# Patient Record
Sex: MALE | Race: WHITE | URBAN - METROPOLITAN AREA
[De-identification: names, ages, dates, MRNs, and addresses within clinical notes are randomized per-mention and may not be internally consistent; named-entity substitution may affect disease eponyms.]

---

## 2017-03-16 ENCOUNTER — INPATIENT (INPATIENT)
Facility: HOSPITAL | Age: 82
LOS: 0 days | Discharge: HOME | End: 2017-03-17
Attending: HOSPITALIST | Admitting: INTERNAL MEDICINE
Payer: MEDICARE

## 2017-03-16 VITALS
DIASTOLIC BLOOD PRESSURE: 70 MMHG | OXYGEN SATURATION: 97 % | SYSTOLIC BLOOD PRESSURE: 149 MMHG | RESPIRATION RATE: 18 BRPM | WEIGHT: 149.91 LBS | HEART RATE: 79 BPM | HEIGHT: 67 IN | TEMPERATURE: 98 F

## 2017-03-16 DIAGNOSIS — Z98.89 OTHER SPECIFIED POSTPROCEDURAL STATES: Chronic | ICD-10-CM

## 2017-03-16 LAB
ALBUMIN SERPL ELPH-MCNC: 3.4 G/DL — SIGNIFICANT CHANGE UP (ref 3.3–5)
ALP SERPL-CCNC: 111 U/L — SIGNIFICANT CHANGE UP (ref 40–120)
ALT FLD-CCNC: 13 U/L — SIGNIFICANT CHANGE UP (ref 12–78)
ANION GAP SERPL CALC-SCNC: 11 MMOL/L — SIGNIFICANT CHANGE UP (ref 5–17)
AST SERPL-CCNC: 21 U/L — SIGNIFICANT CHANGE UP (ref 15–37)
BASOPHILS # BLD AUTO: 0 K/UL — SIGNIFICANT CHANGE UP (ref 0–0.2)
BASOPHILS NFR BLD AUTO: 0.4 % — SIGNIFICANT CHANGE UP (ref 0–2)
BILIRUB SERPL-MCNC: 0.3 MG/DL — SIGNIFICANT CHANGE UP (ref 0.2–1.2)
BUN SERPL-MCNC: 9 MG/DL — SIGNIFICANT CHANGE UP (ref 7–23)
CALCIUM SERPL-MCNC: 8.6 MG/DL — SIGNIFICANT CHANGE UP (ref 8.5–10.1)
CHLORIDE SERPL-SCNC: 105 MMOL/L — SIGNIFICANT CHANGE UP (ref 96–108)
CK SERPL-CCNC: 240 U/L — SIGNIFICANT CHANGE UP (ref 26–308)
CO2 SERPL-SCNC: 25 MMOL/L — SIGNIFICANT CHANGE UP (ref 22–31)
CREAT SERPL-MCNC: 1 MG/DL — SIGNIFICANT CHANGE UP (ref 0.5–1.3)
EOSINOPHIL # BLD AUTO: 0.1 K/UL — SIGNIFICANT CHANGE UP (ref 0–0.5)
EOSINOPHIL NFR BLD AUTO: 1.1 % — SIGNIFICANT CHANGE UP (ref 0–6)
GLUCOSE SERPL-MCNC: 168 MG/DL — HIGH (ref 70–99)
HCT VFR BLD CALC: 39.4 % — SIGNIFICANT CHANGE UP (ref 39–50)
HGB BLD-MCNC: 13.6 G/DL — SIGNIFICANT CHANGE UP (ref 13–17)
INR BLD: 1.07 RATIO — SIGNIFICANT CHANGE UP (ref 0.88–1.16)
LYMPHOCYTES # BLD AUTO: 2.2 K/UL — SIGNIFICANT CHANGE UP (ref 1–3.3)
LYMPHOCYTES # BLD AUTO: 32 % — SIGNIFICANT CHANGE UP (ref 13–44)
MAGNESIUM SERPL-MCNC: 2.2 MG/DL — SIGNIFICANT CHANGE UP (ref 1.8–2.4)
MCHC RBC-ENTMCNC: 30 PG — SIGNIFICANT CHANGE UP (ref 27–34)
MCHC RBC-ENTMCNC: 34.5 GM/DL — SIGNIFICANT CHANGE UP (ref 32–36)
MCV RBC AUTO: 87 FL — SIGNIFICANT CHANGE UP (ref 80–100)
MONOCYTES # BLD AUTO: 0.8 K/UL — SIGNIFICANT CHANGE UP (ref 0–0.9)
MONOCYTES NFR BLD AUTO: 11.1 % — SIGNIFICANT CHANGE UP (ref 2–14)
NEUTROPHILS # BLD AUTO: 3.8 K/UL — SIGNIFICANT CHANGE UP (ref 1.8–7.4)
NEUTROPHILS NFR BLD AUTO: 55.3 % — SIGNIFICANT CHANGE UP (ref 43–77)
PLATELET # BLD AUTO: 238 K/UL — SIGNIFICANT CHANGE UP (ref 150–400)
POTASSIUM SERPL-MCNC: 3.4 MMOL/L — LOW (ref 3.5–5.3)
POTASSIUM SERPL-SCNC: 3.4 MMOL/L — LOW (ref 3.5–5.3)
PROT SERPL-MCNC: 7.1 GM/DL — SIGNIFICANT CHANGE UP (ref 6–8.3)
PROTHROM AB SERPL-ACNC: 11.8 SEC — SIGNIFICANT CHANGE UP (ref 10–13.1)
RBC # BLD: 4.53 M/UL — SIGNIFICANT CHANGE UP (ref 4.2–5.8)
RBC # FLD: 12.3 % — SIGNIFICANT CHANGE UP (ref 10.3–14.5)
SODIUM SERPL-SCNC: 141 MMOL/L — SIGNIFICANT CHANGE UP (ref 135–145)
TROPONIN I SERPL-MCNC: 0.04 NG/ML — SIGNIFICANT CHANGE UP (ref 0.01–0.04)
TROPONIN I SERPL-MCNC: 0.04 NG/ML — SIGNIFICANT CHANGE UP (ref 0.01–0.04)
TROPONIN I SERPL-MCNC: <0.015 NG/ML — SIGNIFICANT CHANGE UP (ref 0.01–0.04)
WBC # BLD: 6.9 K/UL — SIGNIFICANT CHANGE UP (ref 3.8–10.5)
WBC # FLD AUTO: 6.9 K/UL — SIGNIFICANT CHANGE UP (ref 3.8–10.5)

## 2017-03-16 PROCEDURE — 71020: CPT | Mod: 26

## 2017-03-16 PROCEDURE — 73130 X-RAY EXAM OF HAND: CPT | Mod: 26,LT

## 2017-03-16 PROCEDURE — 99285 EMERGENCY DEPT VISIT HI MDM: CPT

## 2017-03-16 PROCEDURE — 73110 X-RAY EXAM OF WRIST: CPT | Mod: 26,LT

## 2017-03-16 PROCEDURE — 93010 ELECTROCARDIOGRAM REPORT: CPT | Mod: 76

## 2017-03-16 RX ORDER — METOPROLOL TARTRATE 50 MG
25 TABLET ORAL
Qty: 0 | Refills: 0 | Status: DISCONTINUED | OUTPATIENT
Start: 2017-03-16 | End: 2017-03-17

## 2017-03-16 RX ORDER — ACETAMINOPHEN 500 MG
650 TABLET ORAL ONCE
Qty: 0 | Refills: 0 | Status: COMPLETED | OUTPATIENT
Start: 2017-03-16 | End: 2017-03-16

## 2017-03-16 RX ORDER — POTASSIUM CHLORIDE 20 MEQ
10 PACKET (EA) ORAL DAILY
Qty: 0 | Refills: 0 | Status: DISCONTINUED | OUTPATIENT
Start: 2017-03-16 | End: 2017-03-17

## 2017-03-16 RX ORDER — SODIUM CHLORIDE 9 MG/ML
3 INJECTION INTRAMUSCULAR; INTRAVENOUS; SUBCUTANEOUS ONCE
Qty: 0 | Refills: 0 | Status: COMPLETED | OUTPATIENT
Start: 2017-03-16 | End: 2017-03-16

## 2017-03-16 RX ORDER — ASPIRIN/CALCIUM CARB/MAGNESIUM 324 MG
325 TABLET ORAL ONCE
Qty: 0 | Refills: 0 | Status: COMPLETED | OUTPATIENT
Start: 2017-03-16 | End: 2017-03-16

## 2017-03-16 RX ADMIN — Medication 650 MILLIGRAM(S): at 13:43

## 2017-03-16 RX ADMIN — Medication 25 MILLIGRAM(S): at 13:43

## 2017-03-16 RX ADMIN — Medication 650 MILLIGRAM(S): at 19:05

## 2017-03-16 RX ADMIN — Medication 325 MILLIGRAM(S): at 13:43

## 2017-03-16 RX ADMIN — SODIUM CHLORIDE 3 MILLILITER(S): 9 INJECTION INTRAMUSCULAR; INTRAVENOUS; SUBCUTANEOUS at 13:26

## 2017-03-16 RX ADMIN — Medication 10 MILLIEQUIVALENT(S): at 15:40

## 2017-03-16 NOTE — ED STATDOCS - PMH
BPH (benign prostatic hyperplasia)    GERD (gastroesophageal reflux disease)    HLD (hyperlipidemia)    HTN (hypertension)    NSVT (nonsustained ventricular tachycardia)    Palpitations    Prostate cancer  s/p radiation

## 2017-03-16 NOTE — ED STATDOCS - MUSCULOSKELETAL, MLM
range of motion is not limited. TTP to dorsal left hand, mostly 3rd and 4th metacarpals. No deformity.

## 2017-03-16 NOTE — ED PROVIDER NOTE - ATTENDING CONTRIBUTION TO CARE
Dr aguayo I agree with scribe and resident documentation, I have supervised tawanda aspects of this chart

## 2017-03-16 NOTE — ED STATDOCS - NS ED MD SCRIBE ATTENDING SCRIBE SECTIONS
CONSULTATIONS/SHIFT CHANGE/HISTORY OF PRESENT ILLNESS/RESULTS/PHYSICAL EXAM/DISPOSITION/PAST MEDICAL/SURGICAL/SOCIAL HISTORY/REVIEW OF SYSTEMS/PROGRESS NOTE

## 2017-03-16 NOTE — ED PROVIDER NOTE - OBJECTIVE STATEMENT
86 y/o M with a hx of BPH, HTN, cardiac stents on Plavix presents to ED s/p slip and fall on ice yesterday onto left hand. Pt is left handed. Denies other injury. Pt did not take his plavix or metoprolol this morning. 86 y/o M with a hx of BPH, HTN, cardiac stents placed 3 years ago, on Plavix presents to ED s/p slip and fall on ice yesterday onto left hand. Pt is left handed. Denies other injury. Pt did not take his plavix or metoprolol this morning. Pt last saw Dr. Luna (cardio) 1 month ago.

## 2017-03-16 NOTE — ED STATDOCS - PROGRESS NOTE DETAILS
Pt with palpitations. EKG shows anterior lateral depressions, change from 6/2016. Denies CP, SOB. xray with no acute dx, patient began to report palpitations, EKG obtained and shows changed, care to be transferred to main ED for further management -Pat Yanez PA-C

## 2017-03-16 NOTE — ED PROVIDER NOTE - NS ED MD SCRIBE ATTENDING SCRIBE SECTIONS
DISPOSITION/HISTORY OF PRESENT ILLNESS/REVIEW OF SYSTEMS/RESULTS/PHYSICAL EXAM/PAST MEDICAL/SURGICAL/SOCIAL HISTORY/PROGRESS NOTE

## 2017-03-16 NOTE — ED PROVIDER NOTE - PROGRESS NOTE DETAILS
D/w Dr. Luna (cardio). Recommends 2 sets of troponins and d/c pt home if negative and pt asymptomatic. AJM: Pt received at signout. trop increased and ecg changes as well. spoke with dr flores who reccs admit. efrain will see pt in AM

## 2017-03-17 VITALS
DIASTOLIC BLOOD PRESSURE: 55 MMHG | RESPIRATION RATE: 19 BRPM | OXYGEN SATURATION: 97 % | TEMPERATURE: 98 F | HEART RATE: 59 BPM | SYSTOLIC BLOOD PRESSURE: 132 MMHG

## 2017-03-17 DIAGNOSIS — M79.642 PAIN IN LEFT HAND: ICD-10-CM

## 2017-03-17 DIAGNOSIS — I10 ESSENTIAL (PRIMARY) HYPERTENSION: ICD-10-CM

## 2017-03-17 DIAGNOSIS — I25.10 ATHEROSCLEROTIC HEART DISEASE OF NATIVE CORONARY ARTERY WITHOUT ANGINA PECTORIS: ICD-10-CM

## 2017-03-17 DIAGNOSIS — I24.9 ACUTE ISCHEMIC HEART DISEASE, UNSPECIFIED: ICD-10-CM

## 2017-03-17 DIAGNOSIS — N40.0 BENIGN PROSTATIC HYPERPLASIA WITHOUT LOWER URINARY TRACT SYMPTOMS: ICD-10-CM

## 2017-03-17 DIAGNOSIS — E87.6 HYPOKALEMIA: ICD-10-CM

## 2017-03-17 DIAGNOSIS — R94.31 ABNORMAL ELECTROCARDIOGRAM [ECG] [EKG]: ICD-10-CM

## 2017-03-17 LAB
ANION GAP SERPL CALC-SCNC: 8 MMOL/L — SIGNIFICANT CHANGE UP (ref 5–17)
BASOPHILS # BLD AUTO: 0 K/UL — SIGNIFICANT CHANGE UP (ref 0–0.2)
BASOPHILS NFR BLD AUTO: 0.8 % — SIGNIFICANT CHANGE UP (ref 0–2)
BUN SERPL-MCNC: 10 MG/DL — SIGNIFICANT CHANGE UP (ref 7–23)
CALCIUM SERPL-MCNC: 8.5 MG/DL — SIGNIFICANT CHANGE UP (ref 8.5–10.1)
CHLORIDE SERPL-SCNC: 107 MMOL/L — SIGNIFICANT CHANGE UP (ref 96–108)
CHOLEST SERPL-MCNC: 224 MG/DL — HIGH (ref 10–199)
CO2 SERPL-SCNC: 28 MMOL/L — SIGNIFICANT CHANGE UP (ref 22–31)
CREAT SERPL-MCNC: 0.76 MG/DL — SIGNIFICANT CHANGE UP (ref 0.5–1.3)
EOSINOPHIL # BLD AUTO: 0.1 K/UL — SIGNIFICANT CHANGE UP (ref 0–0.5)
EOSINOPHIL NFR BLD AUTO: 1.9 % — SIGNIFICANT CHANGE UP (ref 0–6)
GLUCOSE SERPL-MCNC: 106 MG/DL — HIGH (ref 70–99)
HBA1C BLD-MCNC: 6.8 % — HIGH (ref 4–5.6)
HCT VFR BLD CALC: 37 % — LOW (ref 39–50)
HDLC SERPL-MCNC: 56 MG/DL — SIGNIFICANT CHANGE UP (ref 40–125)
HGB BLD-MCNC: 12.1 G/DL — LOW (ref 13–17)
LIPID PNL WITH DIRECT LDL SERPL: 148 MG/DL — HIGH
LYMPHOCYTES # BLD AUTO: 1.7 K/UL — SIGNIFICANT CHANGE UP (ref 1–3.3)
LYMPHOCYTES # BLD AUTO: 30.2 % — SIGNIFICANT CHANGE UP (ref 13–44)
MCHC RBC-ENTMCNC: 29.1 PG — SIGNIFICANT CHANGE UP (ref 27–34)
MCHC RBC-ENTMCNC: 32.7 GM/DL — SIGNIFICANT CHANGE UP (ref 32–36)
MCV RBC AUTO: 89.1 FL — SIGNIFICANT CHANGE UP (ref 80–100)
MONOCYTES # BLD AUTO: 0.7 K/UL — SIGNIFICANT CHANGE UP (ref 0–0.9)
MONOCYTES NFR BLD AUTO: 13 % — SIGNIFICANT CHANGE UP (ref 2–14)
NEUTROPHILS # BLD AUTO: 3 K/UL — SIGNIFICANT CHANGE UP (ref 1.8–7.4)
NEUTROPHILS NFR BLD AUTO: 54 % — SIGNIFICANT CHANGE UP (ref 43–77)
PLATELET # BLD AUTO: 252 K/UL — SIGNIFICANT CHANGE UP (ref 150–400)
POTASSIUM SERPL-MCNC: 3.8 MMOL/L — SIGNIFICANT CHANGE UP (ref 3.5–5.3)
POTASSIUM SERPL-SCNC: 3.8 MMOL/L — SIGNIFICANT CHANGE UP (ref 3.5–5.3)
RBC # BLD: 4.15 M/UL — LOW (ref 4.2–5.8)
RBC # FLD: 12.7 % — SIGNIFICANT CHANGE UP (ref 10.3–14.5)
SODIUM SERPL-SCNC: 143 MMOL/L — SIGNIFICANT CHANGE UP (ref 135–145)
TOTAL CHOLESTEROL/HDL RATIO MEASUREMENT: 4 RATIO — SIGNIFICANT CHANGE UP (ref 3.4–9.6)
TRIGL SERPL-MCNC: 99 MG/DL — SIGNIFICANT CHANGE UP (ref 10–149)
TROPONIN I SERPL-MCNC: 0.02 NG/ML — SIGNIFICANT CHANGE UP (ref 0.01–0.04)
TROPONIN I SERPL-MCNC: 0.03 NG/ML — SIGNIFICANT CHANGE UP (ref 0.01–0.04)
TSH SERPL-MCNC: 2.79 UU/ML — SIGNIFICANT CHANGE UP (ref 0.36–3.74)
WBC # BLD: 5.6 K/UL — SIGNIFICANT CHANGE UP (ref 3.8–10.5)
WBC # FLD AUTO: 5.6 K/UL — SIGNIFICANT CHANGE UP (ref 3.8–10.5)

## 2017-03-17 PROCEDURE — 93306 TTE W/DOPPLER COMPLETE: CPT | Mod: 26

## 2017-03-17 PROCEDURE — 93010 ELECTROCARDIOGRAM REPORT: CPT

## 2017-03-17 RX ORDER — ASPIRIN/CALCIUM CARB/MAGNESIUM 324 MG
81 TABLET ORAL DAILY
Qty: 0 | Refills: 0 | Status: DISCONTINUED | OUTPATIENT
Start: 2017-03-17 | End: 2017-03-17

## 2017-03-17 RX ORDER — ENOXAPARIN SODIUM 100 MG/ML
40 INJECTION SUBCUTANEOUS EVERY 24 HOURS
Qty: 0 | Refills: 0 | Status: DISCONTINUED | OUTPATIENT
Start: 2017-03-17 | End: 2017-03-17

## 2017-03-17 RX ORDER — ATORVASTATIN CALCIUM 80 MG/1
40 TABLET, FILM COATED ORAL AT BEDTIME
Qty: 0 | Refills: 0 | Status: DISCONTINUED | OUTPATIENT
Start: 2017-03-17 | End: 2017-03-17

## 2017-03-17 RX ORDER — ASPIRIN/CALCIUM CARB/MAGNESIUM 324 MG
1 TABLET ORAL
Qty: 0 | Refills: 0 | DISCHARGE
Start: 2017-03-17

## 2017-03-17 RX ORDER — TAMSULOSIN HYDROCHLORIDE 0.4 MG/1
0.4 CAPSULE ORAL AT BEDTIME
Qty: 0 | Refills: 0 | Status: DISCONTINUED | OUTPATIENT
Start: 2017-03-17 | End: 2017-03-17

## 2017-03-17 RX ORDER — CLOPIDOGREL BISULFATE 75 MG/1
75 TABLET, FILM COATED ORAL DAILY
Qty: 0 | Refills: 0 | Status: DISCONTINUED | OUTPATIENT
Start: 2017-03-17 | End: 2017-03-17

## 2017-03-17 RX ORDER — METOPROLOL TARTRATE 50 MG
25 TABLET ORAL
Qty: 0 | Refills: 0 | Status: DISCONTINUED | OUTPATIENT
Start: 2017-03-17 | End: 2017-03-17

## 2017-03-17 RX ORDER — ACETAMINOPHEN 500 MG
650 TABLET ORAL EVERY 6 HOURS
Qty: 0 | Refills: 0 | Status: DISCONTINUED | OUTPATIENT
Start: 2017-03-17 | End: 2017-03-17

## 2017-03-17 RX ADMIN — Medication 25 MILLIGRAM(S): at 06:15

## 2017-03-17 RX ADMIN — Medication 81 MILLIGRAM(S): at 12:03

## 2017-03-17 RX ADMIN — CLOPIDOGREL BISULFATE 75 MILLIGRAM(S): 75 TABLET, FILM COATED ORAL at 12:03

## 2017-03-17 NOTE — CONSULT NOTE ADULT - ASSESSMENT
85 year old man with known CAD presetns for mechanical fall on ice and was admitted for palpitations and change in EKG- He denies chestp ain     Triponins are   minimally elevated and follow up EKG is normal  He wants to go home and follow up in office     recommend out painted ischemic evaluation next week

## 2017-03-17 NOTE — H&P ADULT - PROBLEM SELECTOR PLAN 5
-Xray hand negative for fractures   -Tylenol for pain PRN -Xray hand negative for fractures   -Tylenol for pain PRN  -if pain not improved will need outpatient MRI

## 2017-03-17 NOTE — DISCHARGE NOTE ADULT - PATIENT PORTAL LINK FT
“You can access the [facility] Patient Portal, offered by [facility], by registering with the following website: [email or web address]”

## 2017-03-17 NOTE — DISCHARGE NOTE ADULT - CARE PROVIDER_API CALL
Jony Luna (MD), Cardiovascular Disease; Internal Medicine; Nuclear Cardiology  [address] [place] [address]  Phone: [phone]  Fax: [phone]

## 2017-03-17 NOTE — CONSULT NOTE ADULT - SUBJECTIVE AND OBJECTIVE BOX
CHIEF COMPLAINT: Patient is a 85y old  Male who presents with a chief complaint of I fslipped in the snow and hurt my hand, while I was here my heart started having palpitations (17 Mar 2017 02:32)      HPI:  84 y/o M with PMH of HTN, CAD s/p PCA on Plavix, BPH presented to the hospital with left wrist pain s/p mechanical fall and was noted to have EKG changes. Pt denies any chest pain, palpitations, abdominal pain, nausea, vomiting, urinary symptoms.       PMHx: PAST MEDICAL & SURGICAL HISTORY:  CAD S/P percutaneous coronary angioplasty  Prostate cancer: s/p radiation  Palpitations  NSVT (nonsustained ventricular tachycardia)  GERD (gastroesophageal reflux disease)  HLD (hyperlipidemia)  HTN (hypertension)  BPH (benign prostatic hyperplasia)  S/P cardiac catheterization: diagnostic 12/15/14        Soc Hx:  former smoker       Allergies: Allergies    No Known Allergies        REVIEW OF SYSTEMS:    CONSTITUTIONAL: No weakness, fevers or chills  EYES/ENT: No visual changes;  No vertigo or throat pain   NECK: No pain or stiffness  RESPIRATORY: No cough, wheezing, hemoptysis; No shortness of breath  CARDIOVASCULAR: No chest pain or palpitations  GASTROINTESTINAL: No abdominal or epigastric pain. No nausea, vomiting, or hematemesis; No diarrhea or constipation. No melena or hematochezia.  GENITOURINARY: No dysuria, frequency or hematuria  NEUROLOGICAL: No numbness or weakness  SKIN: No itching, burning, rashes, or lesions   All other review of systems is negative unless indicated above    Vital Signs Last 24 Hrs  T(C): 36.7, Max: 37.1 (03-16 @ 23:33)  T(F): 98.1, Max: 98.7 (03-16 @ 23:33)  HR: 59 (58 - 79)  BP: 137/43 (137/43 - 172/58)  BP(mean): --  RR: 14 (14 - 18)  SpO2: 99% (97% - 99%)    I&O's Summary          PHYSICAL EXAM:   Constitutional: NAD, awake and alert, well-developed  HEENT: PERR, EOMI, Normal Hearing, MMM  Neck: Soft and supple, No LAD, No JVD  Respiratory: Breath sounds are clear bilaterally, No wheezing, rales or rhonchi  Cardiovascular: S1 and S2, regular rate and rhythm, no Murmurs, gallops or rubs  Gastrointestinal: Bowel Sounds present, soft, nontender, nondistended, no guarding, no rebound  Extremities: No peripheral edema  Vascular: 2+ peripheral pulses  Neurological: A/O x 3, no focal deficits  Musculoskeletal: 5/5 strength b/l upper and lower extremities  Skin: No rashes    MEDICATIONS:  MEDICATIONS  (STANDING):  tamsulosin 0.4milliGRAM(s) Oral at bedtime  clopidogrel Tablet 75milliGRAM(s) Oral daily  atorvastatin 40milliGRAM(s) Oral at bedtime  metoprolol 25milliGRAM(s) Oral two times a day  enoxaparin Injectable 40milliGRAM(s) SubCutaneous every 24 hours  aspirin enteric coated 81milliGRAM(s) Oral daily      LABS: All Labs Reviewed:                        13.6   6.9   )-----------( 238      ( 16 Mar 2017 13:21 )             39.4     16 Mar 2017 13:21    141    |  105    |  9      ----------------------------<  168    3.4     |  25     |  1.00     Ca    8.6        16 Mar 2017 13:21  Mg     2.2       16 Mar 2017 13:21    TPro  7.1    /  Alb  3.4    /  TBili  0.3    /  DBili  x      /  AST  21     /  ALT  13     /  AlkPhos  111    16 Mar 2017 13:21    PT/INR - ( 16 Mar 2017 13:21 )   PT: 11.8 sec;   INR: 1.07 ratio           CARDIAC MARKERS ( 17 Mar 2017 02:22 )  0.026 ng/mL / x     / x     / x     / x      CARDIAC MARKERS ( 16 Mar 2017 20:42 )  0.042 ng/mL / x     / x     / x     / x      CARDIAC MARKERS ( 16 Mar 2017 17:10 )  0.043 ng/mL / x     / x     / x     / x      CARDIAC MARKERS ( 16 Mar 2017 13:21 )  <0.015 ng/mL / x     / 240 U/L / x     / x            EKG: SR with PAC, Anterior T wave changes    Telemetry: SR with SA

## 2017-03-17 NOTE — DISCHARGE NOTE ADULT - NS AS ACTIVITY OBS
Stairs allowed/Showering allowed/Driving allowed/Walking-Indoors allowed/Walking-Outdoors allowed/Return to Work/School allowed/Bathing allowed

## 2017-03-17 NOTE — DISCHARGE NOTE ADULT - PLAN OF CARE
rule out ACS.  EKG returned to normal.  No ACS Follow up with Dr. Luna next week for further cardiac testing.  continue all home meds stable continue home meds continue all homes meds continue all home meds

## 2017-03-17 NOTE — H&P ADULT - FAMILY HISTORY
Father  Still living? No  Family history of MI (myocardial infarction), Age at diagnosis: Age Unknown     Mother  Still living? No  Family history of uterine cancer, Age at diagnosis: Age Unknown

## 2017-03-17 NOTE — H&P ADULT - PMH
BPH (benign prostatic hyperplasia)    CAD S/P percutaneous coronary angioplasty    GERD (gastroesophageal reflux disease)    HLD (hyperlipidemia)    HTN (hypertension)    NSVT (nonsustained ventricular tachycardia)    Palpitations    Prostate cancer  s/p radiation

## 2017-03-17 NOTE — PROGRESS NOTE ADULT - SUBJECTIVE AND OBJECTIVE BOX
Pt has been seen and examined with FP resident, resident supervised agree with a/p       85y old  Male who presents with a chief complaint of mechanical fall on ice and wrist pain, here in er found to have new onset ekg change      HPI:  86 y/o M with PMH of HTN, CAD s/p PCA on Plavix, BPH presented to the hospital with left wrist pain s/p mechanical fall and was noted to have EKG changes.    PHYSICAL EXAM:  Vital Signs Last 24 Hrs  T(C): 36.4, Max: 37.1 (03-16 @ 23:33)  T(F): 97.6, Max: 98.7 (03-16 @ 23:33)  HR: 59 (58 - 62)  BP: 132/55 (132/55 - 172/58)  BP(mean): --  RR: 19 (14 - 19)  SpO2: 97% (97% - 99%)  general- comfortable   -rs-aeeb,cta  -cvs-s1s2 normal   -p/a-soft,bs+  -extremity- no assymetrical swelling noted   -cns- non focal         A/P    #New onset EKG changes- seen and evaluated by Dr. Luna  -pt wants to go home and Dr. Luna thinks pt can be discharged with further management as an outpt     #d/c today     #time spent more than 30 minutes
CHIEF COMPLAINT: s/p mechanical fall, EKG changes    SUBJECTIVE: 86 y/o M with PMH of HTN, CAD s/p PCA on Plavix, BPH presented to the hospital with left wrist pain s/p mechanical fall and was noted to have EKG changes. Pt denies any chest pain, palpitations, abdominal pain, nausea, vomiting, urinary symptoms.   Dr. Luna was notified and confirmed that EKG changes are new.     3/17/17 - Patient seen and examined at bedside. Patient in no acute distress.  no events on the tele monitor.  Patient reports that he feels well and denies any chest pain, palpitations, SOB at this time. Patient was seen by cardiologist and was cleared for d/c home and f/u as outpatient.     REVIEW OF SYSTEMS:    CONSTITUTIONAL: No weakness, fevers or chills  EYES/ENT: No visual changes;  No vertigo or throat pain   NECK: No pain or stiffness  RESPIRATORY: No cough, wheezing, hemoptysis; No shortness of breath  CARDIOVASCULAR: No chest pain or palpitations  GASTROINTESTINAL: No abdominal or epigastric pain. No nausea, vomiting, or hematemesis; No diarrhea or constipation. No melena or hematochezia.  GENITOURINARY: No dysuria, frequency or hematuria  NEUROLOGICAL: No numbness or weakness  SKIN: No itching, burning, rashes, or lesions   All other review of systems is negative unless indicated above    T(C): 36.4, Max: 37.1 (03-16 @ 23:33)  T(F): 97.6, Max: 98.7 (03-16 @ 23:33)  HR: 59 (58 - 62)  BP: 132/55 (132/55 - 172/58)  ABP: --  ABP(mean): --  RR: 19 (14 - 19)  SpO2: 97% (97% - 99%)  Wt(kg): --      CAPILLARY BLOOD GLUCOSE      PHYSICAL EXAM:    Constitutional: NAD, awake and alert, well-developed  HEENT: PERR, EOMI, Normal Hearing, MMM  Neck: Soft and supple, No LAD, No JVD  Respiratory: Breath sounds are clear bilaterally, No wheezing, rales or rhonchi  Cardiovascular: S1 and S2, regular rate and rhythm, no Murmurs, gallops or rubs  Gastrointestinal: Bowel Sounds present, soft, nontender, nondistended, no guarding, no rebound  Extremities: No peripheral edema  Vascular: 2+ peripheral pulses  Neurological: A/O x 3, no focal deficits  Musculoskeletal: 5/5 strength b/l upper and lower extremities  Skin: No rashes    MEDICATIONS:  MEDICATIONS  (STANDING):  tamsulosin 0.4milliGRAM(s) Oral at bedtime  clopidogrel Tablet 75milliGRAM(s) Oral daily  atorvastatin 40milliGRAM(s) Oral at bedtime  metoprolol 25milliGRAM(s) Oral two times a day  enoxaparin Injectable 40milliGRAM(s) SubCutaneous every 24 hours  aspirin enteric coated 81milliGRAM(s) Oral daily      LABS: All Labs Reviewed:                        12.1   5.6   )-----------( 252      ( 17 Mar 2017 06:25 )             37.0     17 Mar 2017 06:25    143    |  107    |  10     ----------------------------<  106    3.8     |  28     |  0.76     Ca    8.5        17 Mar 2017 06:25  Mg     2.2       16 Mar 2017 13:21    TPro  7.1    /  Alb  3.4    /  TBili  0.3    /  DBili  x      /  AST  21     /  ALT  13     /  AlkPhos  111    16 Mar 2017 13:21    PT/INR - ( 16 Mar 2017 13:21 )   PT: 11.8 sec;   INR: 1.07 ratio           CARDIAC MARKERS ( 17 Mar 2017 10:00 )  0.017 ng/mL / x     / x     / x     / x      CARDIAC MARKERS ( 17 Mar 2017 02:22 )  0.026 ng/mL / x     / x     / x     / x      CARDIAC MARKERS ( 16 Mar 2017 20:42 )  0.042 ng/mL / x     / x     / x     / x      CARDIAC MARKERS ( 16 Mar 2017 17:10 )  0.043 ng/mL / x     / x     / x     / x      CARDIAC MARKERS ( 16 Mar 2017 13:21 )  <0.015 ng/mL / x     / 240 U/L / x     / x          Blood Culture:     RADIOLOGY/EKG:    DVT PPX:    ADVANCED DIRECTIVE:    DISPOSITION:

## 2017-03-17 NOTE — H&P ADULT - HISTORY OF PRESENT ILLNESS
86 y/o M with PMH of HTN, CAD s/p PCA on Plavix, BPH presented to the hospital with left wrist pain s/p mechanical fall and was noted to have EKG changes. Pt denies any chest pain, palpitations, abdominal pain, nausea, vomiting, urinary symptoms.   Dr. Luna was notified and confirmed that EKG changes are new.

## 2017-03-17 NOTE — PROGRESS NOTE ADULT - PROBLEM SELECTOR PLAN 1
-EKG: sinus rhythm @83bpm, ST depressions V2-V6 on admission with repeat EKG this morning is normal.    -minimally elevated trops, likely due to demand ischemia.  not ACS  -continue ASA, Plavix, Statin, BB  -cardiology consult appreciated.  Patient can be discharged home and f/u with cardio outpatient.

## 2017-03-17 NOTE — PROGRESS NOTE ADULT - PROBLEM SELECTOR PLAN 5
-Xray hand negative for fractures   -Tylenol for pain PRN  -if pain not improved will need outpatient MRI

## 2017-03-17 NOTE — H&P ADULT - PROBLEM SELECTOR PLAN 1
-EKG: sinus rhythm @83bpm, ST depresions V2-V6   -admit to tele  -continues monitorin   -continue Plavix, Statin, BB  -f/u lipid panel, HgA1c   -cardiology consult, possible stress or cath in the am   -YOHAN risk score 4=intermediate risk -EKG: sinus rhythm @83bpm, ST depresions V2-V6   -admit to tele  -continues monitorin   -continue ASA, Plavix, Statin, BB  -f/u lipid panel, HgA1c   -f/u Echo   -cardiology consult, possible stress or cath in the am   -YOHAN risk score 4=intermediate risk

## 2017-03-17 NOTE — DISCHARGE NOTE ADULT - CARE PLAN
Principal Discharge DX:	EKG abnormalities  Goal:	rule out ACS.  EKG returned to normal.  No ACS  Instructions for follow-up, activity and diet:	Follow up with Dr. Luna next week for further cardiac testing.  continue all home meds  Secondary Diagnosis:	BPH (benign prostatic hyperplasia)  Goal:	stable  Instructions for follow-up, activity and diet:	continue home meds  Secondary Diagnosis:	CAD S/P percutaneous coronary angioplasty  Goal:	stable  Instructions for follow-up, activity and diet:	continue all homes meds  Secondary Diagnosis:	HLD (hyperlipidemia)  Goal:	stable  Instructions for follow-up, activity and diet:	continue all home meds

## 2017-03-17 NOTE — H&P ADULT - NSHPLABSRESULTS_GEN_ALL_CORE
CARDIAC MARKERS ( 16 Mar 2017 20:42 )  0.042 ng/mL / x     / x     / x     / x      CARDIAC MARKERS ( 16 Mar 2017 17:10 )  0.043 ng/mL / x     / x     / x     / x      CARDIAC MARKERS ( 16 Mar 2017 13:21 )  <0.015 ng/mL / x     / 240 U/L / x     / x                            13.6   6.9   )-----------( 238      ( 16 Mar 2017 13:21 )             39.4     16 Mar 2017 13:21    141    |  105    |  9      ----------------------------<  168    3.4     |  25     |  1.00     Ca    8.6        16 Mar 2017 13:21  Mg     2.2       16 Mar 2017 13:21    TPro  7.1    /  Alb  3.4    /  TBili  0.3    /  DBili  x      /  AST  21     /  ALT  13     /  AlkPhos  111    16 Mar 2017 13:21    PT/INR - ( 16 Mar 2017 13:21 )   PT: 11.8 sec;   INR: 1.07 ratio         CAPILLARY BLOOD GLUCOSE    LIVER FUNCTIONS - ( 16 Mar 2017 13:21 )  Alb: 3.4 g/dL / Pro: 7.1 gm/dL / ALK PHOS: 111 U/L / ALT: 13 U/L / AST: 21 U/L / GGT: x

## 2017-03-17 NOTE — DISCHARGE NOTE ADULT - MEDICATION SUMMARY - MEDICATIONS TO TAKE
I will START or STAY ON the medications listed below when I get home from the hospital:    aspirin 81 mg oral delayed release tablet  -- 1 tab(s) by mouth once a day  -- Indication: For CAD S/P percutaneous coronary angioplasty    Flomax 0.4 mg oral capsule  -- 1 cap(s) by mouth once a day - home/hospital  -- Indication: For BPH (benign prostatic hyperplasia)    atorvastatin 40 mg oral tablet  -- 1 tab(s) by mouth once a day (at bedtime)  -- Indication: For CAD S/P percutaneous coronary angioplasty    Plavix 75 mg oral tablet  -- 1 tab(s) by mouth once a day - hospital  -- Indication: For CAD S/P percutaneous coronary angioplasty    metoprolol tartrate 25 mg oral tablet  -- 1 tab(s) by mouth 2 times a day  -- Indication: For CAD S/P percutaneous coronary angioplasty

## 2017-03-17 NOTE — H&P ADULT - ATTENDING COMMENTS
Patient seen and examined after initial evaluation by family medicine resident above. Case discussed and reviewed in detail. Please note my plan below    84 yo M with PMH of HTN, dyslipidemia, BPH, osteoporosis, h/o non-sustained V-tach, CAD s/p PCI, p/w mechanical fall. Patient came to ED 2/2 L wrist pain s/p mechanical fall. No fracture reported on X-ray. However, patient had an EKG which demonstrated ST depressions from V2-V6. Patient denies CP, SOB, fever, chills, nausea, vomiting, abdominal pain.    *Abnormal EKG - r/o ACS  -No active CP  -CHRISTINA + Serial EKGs   -H/o CAD - C/w Plavix + ASA  -Statin + Beta blocker  -Cardio consult    *L wrist pain  -CHRISTINA of L wrist is completely intact  -Advised patient that if pain doesn't resolve / improve to get outpatient MRI for further evaluation    *HTN / dyslipidemia / BPH  -C/w home meds    *Hypokalemia  -Replete and recheck potassium  *DVT ppx  -Heparin SubQ

## 2017-03-17 NOTE — DISCHARGE NOTE ADULT - HOSPITAL COURSE
CHIEF COMPLAINT: s/p mechanical fall, EKG changes    SUBJECTIVE: 84 y/o M with PMH of HTN, CAD s/p PCA on Plavix, BPH presented to the hospital with left wrist pain s/p mechanical fall and was noted to have EKG changes. Pt denies any chest pain, palpitations, abdominal pain, nausea, vomiting, urinary symptoms.   Dr. Luna was notified and confirmed that EKG changes are new.     3/17/17 - Patient seen and examined at bedside. Patient in no acute distress.  no events on the tele monitor.  Patient reports that he feels well and denies any chest pain, palpitations, SOB at this time. Patient was seen by cardiologist and was cleared for d/c home and f/u as outpatient.

## 2017-03-21 DIAGNOSIS — Z85.46 PERSONAL HISTORY OF MALIGNANT NEOPLASM OF PROSTATE: ICD-10-CM

## 2017-03-21 DIAGNOSIS — Z92.3 PERSONAL HISTORY OF IRRADIATION: ICD-10-CM

## 2017-03-21 DIAGNOSIS — N40.0 BENIGN PROSTATIC HYPERPLASIA WITHOUT LOWER URINARY TRACT SYMPTOMS: ICD-10-CM

## 2017-03-21 DIAGNOSIS — Z79.02 LONG TERM (CURRENT) USE OF ANTITHROMBOTICS/ANTIPLATELETS: ICD-10-CM

## 2017-03-21 DIAGNOSIS — R94.31 ABNORMAL ELECTROCARDIOGRAM [ECG] [EKG]: ICD-10-CM

## 2017-03-21 DIAGNOSIS — M25.532 PAIN IN LEFT WRIST: ICD-10-CM

## 2017-03-21 DIAGNOSIS — Z95.5 PRESENCE OF CORONARY ANGIOPLASTY IMPLANT AND GRAFT: ICD-10-CM

## 2017-03-21 DIAGNOSIS — E87.6 HYPOKALEMIA: ICD-10-CM

## 2017-03-21 DIAGNOSIS — K21.9 GASTRO-ESOPHAGEAL REFLUX DISEASE WITHOUT ESOPHAGITIS: ICD-10-CM

## 2017-03-21 DIAGNOSIS — M81.0 AGE-RELATED OSTEOPOROSIS WITHOUT CURRENT PATHOLOGICAL FRACTURE: ICD-10-CM

## 2017-03-21 DIAGNOSIS — R00.2 PALPITATIONS: ICD-10-CM

## 2017-03-21 DIAGNOSIS — E78.5 HYPERLIPIDEMIA, UNSPECIFIED: ICD-10-CM

## 2017-03-21 DIAGNOSIS — I25.10 ATHEROSCLEROTIC HEART DISEASE OF NATIVE CORONARY ARTERY WITHOUT ANGINA PECTORIS: ICD-10-CM

## 2017-03-21 DIAGNOSIS — Z87.891 PERSONAL HISTORY OF NICOTINE DEPENDENCE: ICD-10-CM

## 2017-03-21 DIAGNOSIS — I24.8 OTHER FORMS OF ACUTE ISCHEMIC HEART DISEASE: ICD-10-CM

## 2017-03-21 DIAGNOSIS — I10 ESSENTIAL (PRIMARY) HYPERTENSION: ICD-10-CM

## 2018-02-08 ENCOUNTER — INPATIENT (INPATIENT)
Facility: HOSPITAL | Age: 83
LOS: 2 days | Discharge: HOME | End: 2018-02-11
Attending: INTERNAL MEDICINE | Admitting: INTERNAL MEDICINE
Payer: COMMERCIAL

## 2018-02-08 VITALS
WEIGHT: 166.89 LBS | HEART RATE: 76 BPM | DIASTOLIC BLOOD PRESSURE: 70 MMHG | SYSTOLIC BLOOD PRESSURE: 166 MMHG | TEMPERATURE: 102 F | HEIGHT: 66 IN | OXYGEN SATURATION: 94 % | RESPIRATION RATE: 16 BRPM

## 2018-02-08 DIAGNOSIS — Z98.89 OTHER SPECIFIED POSTPROCEDURAL STATES: Chronic | ICD-10-CM

## 2018-02-08 LAB
ALBUMIN SERPL ELPH-MCNC: 3.5 G/DL — SIGNIFICANT CHANGE UP (ref 3.3–5)
ALP SERPL-CCNC: 87 U/L — SIGNIFICANT CHANGE UP (ref 40–120)
ALT FLD-CCNC: 21 U/L — SIGNIFICANT CHANGE UP (ref 12–78)
ANION GAP SERPL CALC-SCNC: 9 MMOL/L — SIGNIFICANT CHANGE UP (ref 5–17)
APPEARANCE UR: CLEAR — SIGNIFICANT CHANGE UP
APTT BLD: 28 SEC — SIGNIFICANT CHANGE UP (ref 27.5–37.4)
AST SERPL-CCNC: 33 U/L — SIGNIFICANT CHANGE UP (ref 15–37)
BACTERIA # UR AUTO: (no result)
BASOPHILS # BLD AUTO: 0 K/UL — SIGNIFICANT CHANGE UP (ref 0–0.2)
BASOPHILS NFR BLD AUTO: 0.2 % — SIGNIFICANT CHANGE UP (ref 0–2)
BILIRUB SERPL-MCNC: 0.5 MG/DL — SIGNIFICANT CHANGE UP (ref 0.2–1.2)
BILIRUB UR-MCNC: NEGATIVE — SIGNIFICANT CHANGE UP
BUN SERPL-MCNC: 16 MG/DL — SIGNIFICANT CHANGE UP (ref 7–23)
CALCIUM SERPL-MCNC: 8.5 MG/DL — SIGNIFICANT CHANGE UP (ref 8.5–10.1)
CHLORIDE SERPL-SCNC: 101 MMOL/L — SIGNIFICANT CHANGE UP (ref 96–108)
CO2 SERPL-SCNC: 24 MMOL/L — SIGNIFICANT CHANGE UP (ref 22–31)
COLOR SPEC: YELLOW — SIGNIFICANT CHANGE UP
CREAT SERPL-MCNC: 1.25 MG/DL — SIGNIFICANT CHANGE UP (ref 0.5–1.3)
DIFF PNL FLD: (no result)
EOSINOPHIL # BLD AUTO: 0 K/UL — SIGNIFICANT CHANGE UP (ref 0–0.5)
EOSINOPHIL NFR BLD AUTO: 0 % — SIGNIFICANT CHANGE UP (ref 0–6)
EPI CELLS # UR: SIGNIFICANT CHANGE UP
GLUCOSE SERPL-MCNC: 143 MG/DL — HIGH (ref 70–99)
GLUCOSE UR QL: NEGATIVE MG/DL — SIGNIFICANT CHANGE UP
HCT VFR BLD CALC: 42 % — SIGNIFICANT CHANGE UP (ref 39–50)
HGB BLD-MCNC: 13.4 G/DL — SIGNIFICANT CHANGE UP (ref 13–17)
KETONES UR-MCNC: (no result)
LACTATE SERPL-SCNC: 1 MMOL/L — SIGNIFICANT CHANGE UP (ref 0.7–2)
LACTATE SERPL-SCNC: 2.3 MMOL/L — HIGH (ref 0.7–2)
LEUKOCYTE ESTERASE UR-ACNC: NEGATIVE — SIGNIFICANT CHANGE UP
LIDOCAIN IGE QN: 58 U/L — LOW (ref 73–393)
LYMPHOCYTES # BLD AUTO: 0.7 K/UL — LOW (ref 1–3.3)
LYMPHOCYTES # BLD AUTO: 5.6 % — LOW (ref 13–44)
MCHC RBC-ENTMCNC: 28.7 PG — SIGNIFICANT CHANGE UP (ref 27–34)
MCHC RBC-ENTMCNC: 31.8 GM/DL — LOW (ref 32–36)
MCV RBC AUTO: 90.1 FL — SIGNIFICANT CHANGE UP (ref 80–100)
MONOCYTES # BLD AUTO: 0.8 K/UL — SIGNIFICANT CHANGE UP (ref 0–0.9)
MONOCYTES NFR BLD AUTO: 6.2 % — SIGNIFICANT CHANGE UP (ref 2–14)
NEUTROPHILS # BLD AUTO: 11.8 K/UL — HIGH (ref 1.8–7.4)
NEUTROPHILS NFR BLD AUTO: 88 % — HIGH (ref 43–77)
NITRITE UR-MCNC: NEGATIVE — SIGNIFICANT CHANGE UP
PH UR: 5 — SIGNIFICANT CHANGE UP (ref 5–8)
PLATELET # BLD AUTO: 201 K/UL — SIGNIFICANT CHANGE UP (ref 150–400)
POTASSIUM SERPL-MCNC: 3.6 MMOL/L — SIGNIFICANT CHANGE UP (ref 3.5–5.3)
POTASSIUM SERPL-SCNC: 3.6 MMOL/L — SIGNIFICANT CHANGE UP (ref 3.5–5.3)
PROT SERPL-MCNC: 7.8 GM/DL — SIGNIFICANT CHANGE UP (ref 6–8.3)
PROT UR-MCNC: 100 MG/DL
RBC # BLD: 4.66 M/UL — SIGNIFICANT CHANGE UP (ref 4.2–5.8)
RBC # FLD: 12.7 % — SIGNIFICANT CHANGE UP (ref 10.3–14.5)
RBC CASTS # UR COMP ASSIST: SIGNIFICANT CHANGE UP /HPF (ref 0–4)
SODIUM SERPL-SCNC: 134 MMOL/L — LOW (ref 135–145)
SP GR SPEC: 1.02 — SIGNIFICANT CHANGE UP (ref 1.01–1.02)
TROPONIN I SERPL-MCNC: 0.03 NG/ML — SIGNIFICANT CHANGE UP (ref 0.01–0.04)
TROPONIN I SERPL-MCNC: 0.04 NG/ML — SIGNIFICANT CHANGE UP (ref 0.01–0.04)
UROBILINOGEN FLD QL: NEGATIVE MG/DL — SIGNIFICANT CHANGE UP
WBC # BLD: 13.4 K/UL — HIGH (ref 3.8–10.5)
WBC # FLD AUTO: 13.4 K/UL — HIGH (ref 3.8–10.5)
WBC UR QL: SIGNIFICANT CHANGE UP

## 2018-02-08 PROCEDURE — 93010 ELECTROCARDIOGRAM REPORT: CPT

## 2018-02-08 PROCEDURE — 70490 CT SOFT TISSUE NECK W/O DYE: CPT | Mod: 26

## 2018-02-08 PROCEDURE — 99285 EMERGENCY DEPT VISIT HI MDM: CPT

## 2018-02-08 PROCEDURE — 71045 X-RAY EXAM CHEST 1 VIEW: CPT | Mod: 26

## 2018-02-08 PROCEDURE — 71250 CT THORAX DX C-: CPT | Mod: 26

## 2018-02-08 PROCEDURE — 70450 CT HEAD/BRAIN W/O DYE: CPT | Mod: 26

## 2018-02-08 RX ORDER — AZITHROMYCIN 500 MG/1
TABLET, FILM COATED ORAL
Qty: 0 | Refills: 0 | Status: DISCONTINUED | OUTPATIENT
Start: 2018-02-08 | End: 2018-02-11

## 2018-02-08 RX ORDER — ACETAMINOPHEN 500 MG
650 TABLET ORAL ONCE
Qty: 0 | Refills: 0 | Status: COMPLETED | OUTPATIENT
Start: 2018-02-08 | End: 2018-02-08

## 2018-02-08 RX ORDER — PIPERACILLIN AND TAZOBACTAM 4; .5 G/20ML; G/20ML
3.38 INJECTION, POWDER, LYOPHILIZED, FOR SOLUTION INTRAVENOUS ONCE
Qty: 0 | Refills: 0 | Status: COMPLETED | OUTPATIENT
Start: 2018-02-08 | End: 2018-02-08

## 2018-02-08 RX ORDER — METOPROLOL TARTRATE 50 MG
25 TABLET ORAL
Qty: 0 | Refills: 0 | Status: DISCONTINUED | OUTPATIENT
Start: 2018-02-08 | End: 2018-02-08

## 2018-02-08 RX ORDER — CEFTRIAXONE 500 MG/1
1000 INJECTION, POWDER, FOR SOLUTION INTRAMUSCULAR; INTRAVENOUS EVERY 24 HOURS
Qty: 0 | Refills: 0 | Status: DISCONTINUED | OUTPATIENT
Start: 2018-02-09 | End: 2018-02-11

## 2018-02-08 RX ORDER — SODIUM CHLORIDE 9 MG/ML
1000 INJECTION INTRAMUSCULAR; INTRAVENOUS; SUBCUTANEOUS
Qty: 0 | Refills: 0 | Status: DISCONTINUED | OUTPATIENT
Start: 2018-02-08 | End: 2018-02-11

## 2018-02-08 RX ORDER — CLOPIDOGREL BISULFATE 75 MG/1
75 TABLET, FILM COATED ORAL DAILY
Qty: 0 | Refills: 0 | Status: DISCONTINUED | OUTPATIENT
Start: 2018-02-08 | End: 2018-02-11

## 2018-02-08 RX ORDER — ASPIRIN/CALCIUM CARB/MAGNESIUM 324 MG
81 TABLET ORAL DAILY
Qty: 0 | Refills: 0 | Status: DISCONTINUED | OUTPATIENT
Start: 2018-02-08 | End: 2018-02-08

## 2018-02-08 RX ORDER — CEFTRIAXONE 500 MG/1
INJECTION, POWDER, FOR SOLUTION INTRAMUSCULAR; INTRAVENOUS
Qty: 0 | Refills: 0 | Status: DISCONTINUED | OUTPATIENT
Start: 2018-02-08 | End: 2018-02-11

## 2018-02-08 RX ORDER — ENOXAPARIN SODIUM 100 MG/ML
40 INJECTION SUBCUTANEOUS EVERY 24 HOURS
Qty: 0 | Refills: 0 | Status: DISCONTINUED | OUTPATIENT
Start: 2018-02-08 | End: 2018-02-11

## 2018-02-08 RX ORDER — VANCOMYCIN HCL 1 G
1000 VIAL (EA) INTRAVENOUS ONCE
Qty: 0 | Refills: 0 | Status: COMPLETED | OUTPATIENT
Start: 2018-02-08 | End: 2018-02-08

## 2018-02-08 RX ORDER — PANTOPRAZOLE SODIUM 20 MG/1
40 TABLET, DELAYED RELEASE ORAL DAILY
Qty: 0 | Refills: 0 | Status: DISCONTINUED | OUTPATIENT
Start: 2018-02-08 | End: 2018-02-09

## 2018-02-08 RX ORDER — SODIUM CHLORIDE 9 MG/ML
500 INJECTION INTRAMUSCULAR; INTRAVENOUS; SUBCUTANEOUS
Qty: 0 | Refills: 0 | Status: COMPLETED | OUTPATIENT
Start: 2018-02-08 | End: 2018-02-08

## 2018-02-08 RX ORDER — ATORVASTATIN CALCIUM 80 MG/1
40 TABLET, FILM COATED ORAL AT BEDTIME
Qty: 0 | Refills: 0 | Status: DISCONTINUED | OUTPATIENT
Start: 2018-02-08 | End: 2018-02-11

## 2018-02-08 RX ORDER — CEFTRIAXONE 500 MG/1
1000 INJECTION, POWDER, FOR SOLUTION INTRAMUSCULAR; INTRAVENOUS ONCE
Qty: 0 | Refills: 0 | Status: COMPLETED | OUTPATIENT
Start: 2018-02-08 | End: 2018-02-08

## 2018-02-08 RX ORDER — TAMSULOSIN HYDROCHLORIDE 0.4 MG/1
0.4 CAPSULE ORAL AT BEDTIME
Qty: 0 | Refills: 0 | Status: DISCONTINUED | OUTPATIENT
Start: 2018-02-08 | End: 2018-02-11

## 2018-02-08 RX ORDER — AZITHROMYCIN 500 MG/1
500 TABLET, FILM COATED ORAL ONCE
Qty: 0 | Refills: 0 | Status: COMPLETED | OUTPATIENT
Start: 2018-02-08 | End: 2018-02-08

## 2018-02-08 RX ORDER — AZITHROMYCIN 500 MG/1
500 TABLET, FILM COATED ORAL EVERY 24 HOURS
Qty: 0 | Refills: 0 | Status: DISCONTINUED | OUTPATIENT
Start: 2018-02-09 | End: 2018-02-11

## 2018-02-08 RX ORDER — CEFTRIAXONE 500 MG/1
INJECTION, POWDER, FOR SOLUTION INTRAMUSCULAR; INTRAVENOUS
Qty: 0 | Refills: 0 | Status: DISCONTINUED | OUTPATIENT
Start: 2018-02-08 | End: 2018-02-08

## 2018-02-08 RX ORDER — SODIUM CHLORIDE 9 MG/ML
3 INJECTION INTRAMUSCULAR; INTRAVENOUS; SUBCUTANEOUS ONCE
Qty: 0 | Refills: 0 | Status: COMPLETED | OUTPATIENT
Start: 2018-02-08 | End: 2018-02-08

## 2018-02-08 RX ADMIN — SODIUM CHLORIDE 2000 MILLILITER(S): 9 INJECTION INTRAMUSCULAR; INTRAVENOUS; SUBCUTANEOUS at 16:12

## 2018-02-08 RX ADMIN — SODIUM CHLORIDE 2000 MILLILITER(S): 9 INJECTION INTRAMUSCULAR; INTRAVENOUS; SUBCUTANEOUS at 16:42

## 2018-02-08 RX ADMIN — SODIUM CHLORIDE 2000 MILLILITER(S): 9 INJECTION INTRAMUSCULAR; INTRAVENOUS; SUBCUTANEOUS at 15:45

## 2018-02-08 RX ADMIN — Medication 75 MILLIGRAM(S): at 16:48

## 2018-02-08 RX ADMIN — PIPERACILLIN AND TAZOBACTAM 200 GRAM(S): 4; .5 INJECTION, POWDER, LYOPHILIZED, FOR SOLUTION INTRAVENOUS at 16:49

## 2018-02-08 RX ADMIN — PANTOPRAZOLE SODIUM 40 MILLIGRAM(S): 20 TABLET, DELAYED RELEASE ORAL at 22:24

## 2018-02-08 RX ADMIN — SODIUM CHLORIDE 3 MILLILITER(S): 9 INJECTION INTRAMUSCULAR; INTRAVENOUS; SUBCUTANEOUS at 16:11

## 2018-02-08 RX ADMIN — CEFTRIAXONE 1000 MILLIGRAM(S): 500 INJECTION, POWDER, FOR SOLUTION INTRAMUSCULAR; INTRAVENOUS at 21:13

## 2018-02-08 RX ADMIN — Medication 250 MILLIGRAM(S): at 16:00

## 2018-02-08 RX ADMIN — ENOXAPARIN SODIUM 40 MILLIGRAM(S): 100 INJECTION SUBCUTANEOUS at 19:46

## 2018-02-08 RX ADMIN — AZITHROMYCIN 255 MILLIGRAM(S): 500 TABLET, FILM COATED ORAL at 19:46

## 2018-02-08 RX ADMIN — SODIUM CHLORIDE 2000 MILLILITER(S): 9 INJECTION INTRAMUSCULAR; INTRAVENOUS; SUBCUTANEOUS at 16:27

## 2018-02-08 RX ADMIN — TAMSULOSIN HYDROCHLORIDE 0.4 MILLIGRAM(S): 0.4 CAPSULE ORAL at 21:28

## 2018-02-08 RX ADMIN — ATORVASTATIN CALCIUM 40 MILLIGRAM(S): 80 TABLET, FILM COATED ORAL at 21:28

## 2018-02-08 RX ADMIN — Medication 650 MILLIGRAM(S): at 16:09

## 2018-02-08 RX ADMIN — SODIUM CHLORIDE 75 MILLILITER(S): 9 INJECTION INTRAMUSCULAR; INTRAVENOUS; SUBCUTANEOUS at 20:07

## 2018-02-08 RX ADMIN — SODIUM CHLORIDE 2000 MILLILITER(S): 9 INJECTION INTRAMUSCULAR; INTRAVENOUS; SUBCUTANEOUS at 15:40

## 2018-02-08 NOTE — H&P ADULT - ASSESSMENT
A/P    #sepsis due to flu along with superimposed bacterial infection- URTI or  possible pneumonia   -admit, iv fluids, blood culture, abx, CT chest and neck, supportive care, food as tolerated   -tamiflu     #elevated glucose- ISS    #hyponatremia- monitor it -most likely due to dehydration and free water intake     #possible syncope- -tele, echo, cardiology evaluation     #low voltage QRS- echo and cardiology evaluation to follow, CT chest to follow     #dvt pr     #Above discussed with pt and pt's family and all questions have been answered A/P    #sepsis due to flu along with superimposed bacterial infection- URTI or  possible pneumonia   -admit, iv fluids, blood culture, abx, CT chest and neck, supportive care, food as tolerated   -tamiflu     #elevated glucose- ISS    #hyponatremia- monitor it -most likely due to dehydration and free water intake     #? syncope- -tele, echo, cardiology evaluation. Most likely pt put his head on dining table due to weakness.     #low voltage QRS- echo and cardiology evaluation to follow, CT chest to follow     #dvt pr     #Above discussed with pt and pt's family and all questions have been answered

## 2018-02-08 NOTE — ED ADULT NURSE NOTE - OBJECTIVE STATEMENT
Pt biba s/p syncopal episode at home. pt was at the doctor today, tested + for the flu  was sent home on tamiflu.

## 2018-02-08 NOTE — CHART NOTE - NSCHARTNOTEFT_GEN_A_CORE
CT scan noted, discussed with pt and family at bedside   -ct same treatment, ent evaluation, gi evaluation

## 2018-02-08 NOTE — ED PROVIDER NOTE - OBJECTIVE STATEMENT
85 y/o male w. PMHx of GERD,HTN,HLD,CAD,NSVT presents to the ED after he started having flu like Sx such as fever, sore throat, non productive cough, diarrhea and decreased appetite onset x 1 day. He visited his PCP, , earlier today. Results showed that he was flu positive and was Rx'd script for Tamiflu. When pt reached home he started drinking broth and started shaking and wife caught him before he hit the floor. There was brief LOC, but no post ictal periods and no head trauma, as per wife. In ED pt denies CP,SOB, dizziness, lightheadedness, abdominal pain, urinary complaints,  weakness in extremities or any other acute medical complaints at this time. 85 y/o male w. PMHx of GERD,HTN,HLD,CAD,NSVT presents to the ED after he started having flu like Sx such as fever, sore throat, non productive cough, diarrhea and decreased appetite onset x 1 day. He visited his PCP, , earlier today. Results showed that he was flu positive and was Rx'd script for Tamiflu. When pt reached home he started drinking broth and started shaking and wife caught him before he hit the floor. There was brief LOC, but no post ictal periods and no head trauma, as per wife. Not incontinent of bowel or bladder function; In ED pt denies CP,SOB, dizziness, lightheadedness, abdominal pain, urinary complaints,  weakness in extremities or any other acute complaints at this time.

## 2018-02-08 NOTE — H&P ADULT - HISTORY OF PRESENT ILLNESS
87 y/o male has productive cough, started few days ago, gradually getting worse, productive of sputum which has just turned to yellow, associated with painful swallowing as his throats hurts during swallowing, not eating properly since last couple of days, getting weaker. on 2/8 went to see his pmd and was prescribed tamiflu after diagnosing him with it, went home and was feeling lethargic. While he was eating he put his head on dinner table and was not able to respond to his wife and brought here.   -no aggravating factor, no relieving factor for above, here in ER fever noted

## 2018-02-08 NOTE — H&P ADULT - NSHPPHYSICALEXAM_GEN_ALL_CORE
Vital Signs Last 24 Hrs  T(C): 39 (08 Feb 2018 15:20), Max: 39 (08 Feb 2018 15:20)  T(F): 102.2 (08 Feb 2018 15:20), Max: 102.2 (08 Feb 2018 15:20)  HR: 76 (08 Feb 2018 15:20) (76 - 76)  BP: 166/70 (08 Feb 2018 15:20) (166/70 - 166/70)  BP(mean): --  RR: 16 (08 Feb 2018 15:20) (16 - 16)  SpO2: 94% (08 Feb 2018 15:20) (94% - 94%)    General: toxic appearance, comfortable   Head- Atraumatic, normocephalic   Neurology: A&Ox3, nonfocal, CN II to XII intact, power intact 5/5 in all muscle group  HEENT- PERRLA, dry muscous membrane, / ? enlarged tonsil noted on left side, congested pharyngeal wall  Neck-supple, no JVD  Respiratory: Air entry equal b/l, CTA   CVS:  S1S2, no murmurs, rubs or gallops  Abdominal: Soft, NT, ND +BS,   Genitourinary- voiding, non palpable bladder  Extremities: No edema, + peripheral pulses  Skin- no rash, no ulcer  Psychiatric- mood stable   LN- no lymphadenopathy

## 2018-02-08 NOTE — H&P ADULT - NSHPLABSRESULTS_GEN_ALL_CORE
13.4   13.4  )-----------( 201      ( 08 Feb 2018 15:38 )             42.0     02-08    134<L>  |  101  |  16  ----------------------------<  143<H>  3.6   |  24  |  1.25    Ca    8.5      08 Feb 2018 15:38    TPro  7.8  /  Alb  3.5  /  TBili  0.5  /  DBili  x   /  AST  33  /  ALT  21  /  AlkPhos  87  02-08    LIVER FUNCTIONS - ( 08 Feb 2018 15:38 )  Alb: 3.5 g/dL / Pro: 7.8 gm/dL / ALK PHOS: 87 U/L / ALT: 21 U/L / AST: 33 U/L / GGT: x             PTT - ( 08 Feb 2018 15:38 )  PTT:28.0 sec      PTT - ( 08 Feb 2018 15:38 )  PTT:28.0 sec  CAPILLARY BLOOD GLUCOSE    #chest xray-reviewed myself- no acute infiltrate noted     #EKg- sinus , low voltage QRS, no acute st and t wave changes noted

## 2018-02-08 NOTE — ED PROVIDER NOTE - MEDICAL DECISION MAKING DETAILS
86 y.o male w. flu like sx's and positive flu swab at PMD office today with Syncope vs. seizure. Sepsis protocol initiated. Will order EKG, CXR LABS, UA, ADMIT

## 2018-02-08 NOTE — PATIENT PROFILE ADULT. - VISION (WITH CORRECTIVE LENSES IF THE PATIENT USUALLY WEARS THEM):
Partially impaired: cannot see medication labels or newsprint, but can see obstacles in path, and the surrounding layout; can count fingers at arm's length/glasses are at home

## 2018-02-08 NOTE — ED ADULT TRIAGE NOTE - CHIEF COMPLAINT QUOTE
Pt. to the ED S/P Syncope episode while sitting at table- Pt. wife states pt. was diagnosed with Flu today - Code Sepsis to the Ed Called by EMS RN

## 2018-02-08 NOTE — PROVIDER CONTACT NOTE (OTHER) - SITUATION
OFFICE AWARE OF CONSULT
spoke with Dmai from answering service will make md aware
spoke with Justyn from answering service will make MD aware

## 2018-02-09 RX ORDER — POTASSIUM CHLORIDE 20 MEQ
40 PACKET (EA) ORAL ONCE
Qty: 0 | Refills: 0 | Status: COMPLETED | OUTPATIENT
Start: 2018-02-09 | End: 2018-02-09

## 2018-02-09 RX ORDER — NYSTATIN 500MM UNIT
500000 POWDER (EA) MISCELLANEOUS THREE TIMES A DAY
Qty: 0 | Refills: 0 | Status: DISCONTINUED | OUTPATIENT
Start: 2018-02-09 | End: 2018-02-11

## 2018-02-09 RX ADMIN — Medication 30 MILLIGRAM(S): at 05:02

## 2018-02-09 RX ADMIN — TAMSULOSIN HYDROCHLORIDE 0.4 MILLIGRAM(S): 0.4 CAPSULE ORAL at 21:49

## 2018-02-09 RX ADMIN — Medication 30 MILLIGRAM(S): at 17:38

## 2018-02-09 RX ADMIN — ENOXAPARIN SODIUM 40 MILLIGRAM(S): 100 INJECTION SUBCUTANEOUS at 19:44

## 2018-02-09 RX ADMIN — ATORVASTATIN CALCIUM 40 MILLIGRAM(S): 80 TABLET, FILM COATED ORAL at 21:49

## 2018-02-09 RX ADMIN — Medication 40 MILLIEQUIVALENT(S): at 11:52

## 2018-02-09 RX ADMIN — AZITHROMYCIN 255 MILLIGRAM(S): 500 TABLET, FILM COATED ORAL at 19:42

## 2018-02-09 RX ADMIN — CLOPIDOGREL BISULFATE 75 MILLIGRAM(S): 75 TABLET, FILM COATED ORAL at 11:52

## 2018-02-09 RX ADMIN — Medication 500000 UNIT(S): at 21:49

## 2018-02-09 RX ADMIN — CEFTRIAXONE 1000 MILLIGRAM(S): 500 INJECTION, POWDER, FOR SOLUTION INTRAMUSCULAR; INTRAVENOUS at 21:49

## 2018-02-09 RX ADMIN — SODIUM CHLORIDE 75 MILLILITER(S): 9 INJECTION INTRAMUSCULAR; INTRAVENOUS; SUBCUTANEOUS at 11:53

## 2018-02-09 NOTE — CONSULT NOTE ADULT - SUBJECTIVE AND OBJECTIVE BOX
CHIEF COMPLAINT: Patient is a 86y old  Male who presents with a chief complaint of cough, lethargy, passing out (08 Feb 2018 20:02)      HPI:  87 y/o male has productive cough, started few days ago, gradually getting worse, productive of sputum which has just turned to yellow, associated with painful swallowing as his throats hurts during swallowing, not eating properly since last couple of days, getting weaker. on 2/8 went to see his pmd and was prescribed tamiflu after diagnosing him with it, went home and was feeling lethargic. While he was eating he put his head on dinner table and was not able to respond to his wife and brought here.   -no aggravating factor, no relieving factor for above, here in ER fever noted (08 Feb 2018 20:02)     feeling better this AM after hydration      PMHx: PAST MEDICAL & SURGICAL HISTORY:  CAD S/P percutaneous coronary angioplasty  Prostate cancer: s/p radiation  Palpitations  NSVT (nonsustained ventricular tachycardia)  GERD (gastroesophageal reflux disease)  HLD (hyperlipidemia)  HTN (hypertension)  BPH (benign prostatic hyperplasia)  S/P cardiac catheterization: diagnostic 12/15/14        Soc Hx:  no toxic habits      Allergies: Allergies    No Known Allergies    Intolerances          REVIEW OF SYSTEMS:    CONSTITUTIONAL: No weakness, fevers or chills  EYES/ENT: No visual changes;  No vertigo or throat pain   NECK: No pain or stiffness  RESPIRATORY: No cough, wheezing, hemoptysis; No shortness of breath  CARDIOVASCULAR: No chest pain or palpitations  GASTROINTESTINAL: No abdominal or epigastric pain. No nausea, vomiting, or hematemesis; No diarrhea or constipation. No melena or hematochezia.  GENITOURINARY: No dysuria, frequency or hematuria  NEUROLOGICAL: No numbness or weakness  SKIN: No itching, burning, rashes, or lesions   All other review of systems is negative unless indicated above    Vital Signs Last 24 Hrs  T(C): 38.7 (09 Feb 2018 05:10), Max: 39 (08 Feb 2018 15:20)  T(F): 101.7 (09 Feb 2018 05:10), Max: 102.2 (08 Feb 2018 15:20)  HR: 89 (09 Feb 2018 05:10) (64 - 89)  BP: 130/42 (09 Feb 2018 05:10) (130/42 - 166/70)  BP(mean): 63 (09 Feb 2018 05:10) (63 - 63)  RR: 18 (08 Feb 2018 22:07) (16 - 22)  SpO2: 91% (09 Feb 2018 05:10) (91% - 97%)    I&O's Summary    08 Feb 2018 07:01  -  09 Feb 2018 07:00  --------------------------------------------------------  IN: 240 mL / OUT: 0 mL / NET: 240 mL            PHYSICAL EXAM:   Constitutional: NAD, awake and alert, well-developed  HEENT: PERR, EOMI, Normal Hearing, MMM  Neck: Soft and supple, No LAD, No JVD  Respiratory: Breath sounds are clear bilaterally, No wheezing, rales or rhonchi  Cardiovascular: S1 and S2, regular rate and rhythm, no Murmurs, gallops or rubs  Gastrointestinal: Bowel Sounds present, soft, nontender, nondistended, no guarding, no rebound  Extremities: No peripheral edema  Vascular: 2+ peripheral pulses  Neurological: A/O x 3, no focal deficits  Musculoskeletal: 5/5 strength b/l upper and lower extremities  Skin: No rashes    MEDICATIONS:  MEDICATIONS  (STANDING):  atorvastatin 40 milliGRAM(s) Oral at bedtime  azithromycin  IVPB 500 milliGRAM(s) IV Intermittent every 24 hours  azithromycin  IVPB      cefTRIAXone Injectable.      cefTRIAXone Injectable. 1000 milliGRAM(s) IV Push every 24 hours  clopidogrel Tablet 75 milliGRAM(s) Oral daily  enoxaparin Injectable 40 milliGRAM(s) SubCutaneous every 24 hours  oseltamivir 30 milliGRAM(s) Oral every 12 hours  pantoprazole  Injectable 40 milliGRAM(s) IV Push daily  sodium chloride 0.9%. 1000 milliLiter(s) (75 mL/Hr) IV Continuous <Continuous>  tamsulosin 0.4 milliGRAM(s) Oral at bedtime      LABS: All Labs Reviewed:                        13.4   13.4  )-----------( 201      ( 08 Feb 2018 15:38 )             42.0     02-08    134<L>  |  101  |  16  ----------------------------<  143<H>  3.6   |  24  |  1.25    Ca    8.5      08 Feb 2018 15:38    TPro  7.8  /  Alb  3.5  /  TBili  0.5  /  DBili  x   /  AST  33  /  ALT  21  /  AlkPhos  87  02-08    PTT - ( 08 Feb 2018 15:38 )  PTT:28.0 sec  CARDIAC MARKERS ( 08 Feb 2018 20:19 )  0.042 ng/mL / x     / x     / x     / x      CARDIAC MARKERS ( 08 Feb 2018 16:56 )  0.030 ng/mL / x     / x     / x     / x            EKG: SR, normal axis nad intervals no significant ST changes     Telemetry: SR

## 2018-02-09 NOTE — CONSULT NOTE ADULT - ASSESSMENT
86 year old man with influenza, with syncopal episode/ AMS       recommend hydration and checking of orthostatics- he is currently hemodynamically stable and NSR on tele     recommend Tamiflu and supportive care    can check 2 d echo, will follow   can monitor tele 24 hrs

## 2018-02-09 NOTE — PROGRESS NOTE ADULT - ATTENDING COMMENTS
pt seen and examined. agree with above with appropriate edits as made.   total time 35 minutes, including coordination of care and discussion with care team.

## 2018-02-09 NOTE — PROGRESS NOTE ADULT - ASSESSMENT
Sepsis in the setting of superimposed bacterial infection/ pneumonia / Flu  - he feels improved from on admission  - lactate initially 2.3 but repeat w/in norm at 1.0  - con't azithromycin /ceftriaxaone / tamiflu (chart review notable for +FLU with PMD eval day of adm)  - supportive therapy    Borderline hyponatremia-  suspect d/t volume depeletion  - con't to monitormost likely due to dehydration and free water intake     Weakness / ? Syncope  - mental status at baseline  - Head CT devoid of acute pathology  - ECG/TELE NSR, no acute ischemic / rhythm abnormalities    CAD  - s/p PCI 2014 as in HPI  - he's chest pain free/ TELE /ECG NSR no ischemic changes    Oropharyngeal Candidiasis  - oral nystatin swish and swallow    BPH  - voiding well con't flomax    Dispo  - full code  - plan of care d/w pt and spouse at bedside Sepsis in the setting of superimposed bacterial infection/ pneumonia / influenza A  - he feels improved from on admission  - lactate initially 2.3 but repeat w/in norm at 1.0  - con't azithromycin /ceftriaxaone / tamiflu (chart review notable for +FLU with PMD eval day of adm)  - supportive therapy    Borderline hyponatremia-  suspect d/t volume depeletion  - con't to monitormost likely due to dehydration and free water intake     Weakness / ? Syncope  - mental status at baseline  - Head CT devoid of acute pathology  - ECG/TELE NSR, no acute ischemic / rhythm abnormalities    CAD  - s/p PCI 2014 as in HPI  - he's chest pain free/ TELE /ECG NSR no ischemic changes    Oropharyngeal Candidiasis  - oral nystatin swish and swallow    BPH  - voiding well con't flomax    Dispo  - full code  - plan of care d/w pt and spouse at bedside

## 2018-02-09 NOTE — PROGRESS NOTE ADULT - SUBJECTIVE AND OBJECTIVE BOX
87 yo Man with pmhx HTN, NSVT, formal smoker, BPH, Prostate ca (s/p radiation) NSTEMI s/p successful PCI with  x2 JEVON LAD and 1 JEVON LM in  at [facility] presented to ED on  with c/o worsening productive cough yellow sputum, started few days prior a/w painful swallowing as his throats hurts during swallowing, not eating properly since last couple of days, getting weaker. on  went to see his pmd and was prescribed tamiflu after diagnosing him with FLU went home and was feeling lethargic. While he was eating he put his head on dinner table and was not able to respond to his wife at which time she brought him to the ED. In ED he was noted to be febrile and lactate elevated.    VITALS:  Vital Signs Last 24 Hrs  T(C): 36.8 (2018 11:42), Max: 38.7 (2018 05:10)  T(F): 98.3 (2018 11:42), Max: 101.7 (2018 05:10)  HR: 69 (2018 11:42) (64 - 89)  BP: 131/46 (2018 11:42) (130/42 - 155/62)  BP(mean): 63 (2018 05:10) (63 - 63)  RR: 18 (2018 11:42) (16 - 22)  SpO2: 93% (2018 11:42) (91% - 97%)    2018 07:01  -  2018 07:00  --------------------------------------------------------  IN:    Oral Fluid: 240 mL  Total IN: 240 mL    OUT:  Total OUT: 0 mL    Total NET: 240 mL    PHYSICAL EXAM:  HEENT:  pupils equal and reactive, EOMI, tongue with scattered thick white debris as is back of throat, no redness noted  NECK:   supple, no carotid bruits, no palpable lymph nodes, no thyromegaly  CV:  RRR, S1S2, no murmurs or rubs  RESP:   lungs clear to auscultation bilaterally, no wheezing, rales, rhonchi, good air entry bilaterally  GI:  abdomen soft, non-tender, non-distended, normal BS, no bruits, no abdominal masses, no palpable masses  :  Voiding well spontaneously  MSK:   normal muscle tone, no atrophy, no rigidity, no contractions  EXT:   no clubbing, no cyanosis, no edema, no calf pain, swelling or erythema  VASCULAR:  pulses equal and symmetric in the upper and lower extremities  NEURO:  AAOX3, no focal neurological deficits, follows all commands, able to move extremities spontaneously  SKIN:  no ulcers, lesions or rashes    LABS                      13.4   13.4  )-----------( 201      ( 2018 15:38 )             42.0         134<L>  |  101  |  16  ----------------------------<  143<H>  3.6   |  24  |  1.25    Ca    8.5      2018 15:38    TPro  7.8  /  Alb  3.5  /  TBili  0.5  /  DBili  x   /  AST  33  /  ALT  21  /  AlkPhos  87      CARDIAC MARKERS ( 2018 20:19 )  0.042 ng/mL / x     / x     / x     / x      CARDIAC MARKERS ( 2018 16:56 )  0.030 ng/mL / x     / x     / x     / x          LIVER FUNCTIONS - ( 2018 15:38 )  Alb: 3.5 g/dL / Pro: 7.8 gm/dL / ALK PHOS: 87 U/L / ALT: 21 U/L / AST: 33 U/L / GGT: x           PTT - ( 2018 15:38 )  PTT:28.0 sec  Urinalysis Basic - ( 2018 21:49 )    Color: Yellow / Appearance: Clear / S.020 / pH: x  Gluc: x / Ketone: Small  / Bili: Negative / Urobili: Negative mg/dL   Blood: x / Protein: 100 mg/dL / Nitrite: Negative   Leuk Esterase: Negative / RBC: 0-5 /HPF / WBC 3-5   Sq Epi: x / Non Sq Epi: Few / Bacteria: Few    Lactate, Blood: 1.0 mmol/L ( @ 20:19)  Lactate, Blood: 2.3 mmol/L ( @ 15:38)    Blood, Urine: Moderate ( @ 21:49)    < from: CT Head No Cont (18 @ 16:33) >  IMPRESSION:       No acute intracranial findings. 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      < end of copied text >  < from: CT Chest No Cont (18 @ 20:36) >  IMPRESSION: Patchy left lower lobe pneumonia.  < end of copied text >    < from: CT Neck Soft Tissue No Cont (18 @ 20:33) >  No retropharyngeal collection, tonsillar or peritonsillar abscess.  Thickening of the right aryepiglottic fold, posterior wall of the   hypopharynx and mild dilatation of the right piriform sinuses direct   endoscopic visualization is suggested for further assessment.     < end of copied text >    MEDICATIONS  (STANDING):  atorvastatin 40 milliGRAM(s) Oral at bedtime  azithromycin  IVPB 500 milliGRAM(s) IV Intermittent every 24 hours  azithromycin  IVPB      cefTRIAXone Injectable.      cefTRIAXone Injectable. 1000 milliGRAM(s) IV Push every 24 hours  clopidogrel Tablet 75 milliGRAM(s) Oral daily  enoxaparin Injectable 40 milliGRAM(s) SubCutaneous every 24 hours  oseltamivir 30 milliGRAM(s) Oral every 12 hours  sodium chloride 0.9%. 1000 milliLiter(s) (75 mL/Hr) IV Continuous <Continuous>  tamsulosin 0.4 milliGRAM(s) Oral at bedtime    MEDICATIONS  (PRN):

## 2018-02-10 LAB
ANION GAP SERPL CALC-SCNC: 6 MMOL/L — SIGNIFICANT CHANGE UP (ref 5–17)
BUN SERPL-MCNC: 7 MG/DL — SIGNIFICANT CHANGE UP (ref 7–23)
CALCIUM SERPL-MCNC: 7.5 MG/DL — LOW (ref 8.5–10.1)
CHLORIDE SERPL-SCNC: 109 MMOL/L — HIGH (ref 96–108)
CO2 SERPL-SCNC: 25 MMOL/L — SIGNIFICANT CHANGE UP (ref 22–31)
CREAT SERPL-MCNC: 0.67 MG/DL — SIGNIFICANT CHANGE UP (ref 0.5–1.3)
CULTURE RESULTS: NO GROWTH — SIGNIFICANT CHANGE UP
GLUCOSE BLDC GLUCOMTR-MCNC: 110 MG/DL — HIGH (ref 70–99)
GLUCOSE SERPL-MCNC: 100 MG/DL — HIGH (ref 70–99)
HCT VFR BLD CALC: 32.2 % — LOW (ref 39–50)
HGB BLD-MCNC: 10.6 G/DL — LOW (ref 13–17)
MAGNESIUM SERPL-MCNC: 2.1 MG/DL — SIGNIFICANT CHANGE UP (ref 1.6–2.6)
MCHC RBC-ENTMCNC: 29.1 PG — SIGNIFICANT CHANGE UP (ref 27–34)
MCHC RBC-ENTMCNC: 32.8 GM/DL — SIGNIFICANT CHANGE UP (ref 32–36)
MCV RBC AUTO: 88.8 FL — SIGNIFICANT CHANGE UP (ref 80–100)
PLATELET # BLD AUTO: 166 K/UL — SIGNIFICANT CHANGE UP (ref 150–400)
POTASSIUM SERPL-MCNC: 3.4 MMOL/L — LOW (ref 3.5–5.3)
POTASSIUM SERPL-SCNC: 3.4 MMOL/L — LOW (ref 3.5–5.3)
RBC # BLD: 3.63 M/UL — LOW (ref 4.2–5.8)
RBC # FLD: 12.8 % — SIGNIFICANT CHANGE UP (ref 10.3–14.5)
SODIUM SERPL-SCNC: 140 MMOL/L — SIGNIFICANT CHANGE UP (ref 135–145)
SPECIMEN SOURCE: SIGNIFICANT CHANGE UP
WBC # BLD: 5.6 K/UL — SIGNIFICANT CHANGE UP (ref 3.8–10.5)
WBC # FLD AUTO: 5.6 K/UL — SIGNIFICANT CHANGE UP (ref 3.8–10.5)

## 2018-02-10 RX ORDER — AMIODARONE HYDROCHLORIDE 400 MG/1
200 TABLET ORAL ONCE
Qty: 0 | Refills: 0 | Status: DISCONTINUED | OUTPATIENT
Start: 2018-02-10 | End: 2018-02-10

## 2018-02-10 RX ORDER — POTASSIUM CHLORIDE 20 MEQ
40 PACKET (EA) ORAL ONCE
Qty: 0 | Refills: 0 | Status: COMPLETED | OUTPATIENT
Start: 2018-02-10 | End: 2018-02-10

## 2018-02-10 RX ORDER — METOPROLOL TARTRATE 50 MG
5 TABLET ORAL ONCE
Qty: 0 | Refills: 0 | Status: COMPLETED | OUTPATIENT
Start: 2018-02-10 | End: 2018-02-10

## 2018-02-10 RX ORDER — DIGOXIN 250 MCG
0.25 TABLET ORAL ONCE
Qty: 0 | Refills: 0 | Status: COMPLETED | OUTPATIENT
Start: 2018-02-10 | End: 2018-02-10

## 2018-02-10 RX ADMIN — ENOXAPARIN SODIUM 40 MILLIGRAM(S): 100 INJECTION SUBCUTANEOUS at 20:11

## 2018-02-10 RX ADMIN — TAMSULOSIN HYDROCHLORIDE 0.4 MILLIGRAM(S): 0.4 CAPSULE ORAL at 21:49

## 2018-02-10 RX ADMIN — ATORVASTATIN CALCIUM 40 MILLIGRAM(S): 80 TABLET, FILM COATED ORAL at 21:49

## 2018-02-10 RX ADMIN — CLOPIDOGREL BISULFATE 75 MILLIGRAM(S): 75 TABLET, FILM COATED ORAL at 11:49

## 2018-02-10 RX ADMIN — Medication 500000 UNIT(S): at 16:17

## 2018-02-10 RX ADMIN — Medication 30 MILLIGRAM(S): at 18:39

## 2018-02-10 RX ADMIN — Medication 5 MILLIGRAM(S): at 09:47

## 2018-02-10 RX ADMIN — Medication 30 MILLIGRAM(S): at 05:13

## 2018-02-10 RX ADMIN — CEFTRIAXONE 1000 MILLIGRAM(S): 500 INJECTION, POWDER, FOR SOLUTION INTRAMUSCULAR; INTRAVENOUS at 21:48

## 2018-02-10 RX ADMIN — Medication 500000 UNIT(S): at 05:13

## 2018-02-10 RX ADMIN — Medication 5 MILLIGRAM(S): at 09:14

## 2018-02-10 RX ADMIN — Medication 0.25 MILLIGRAM(S): at 10:02

## 2018-02-10 RX ADMIN — Medication 40 MILLIEQUIVALENT(S): at 16:16

## 2018-02-10 RX ADMIN — Medication 500000 UNIT(S): at 21:49

## 2018-02-10 RX ADMIN — AZITHROMYCIN 255 MILLIGRAM(S): 500 TABLET, FILM COATED ORAL at 18:39

## 2018-02-10 NOTE — CONSULT NOTE ADULT - SUBJECTIVE AND OBJECTIVE BOX
Patient is a 86y old  Male who presents with a chief complaint of cough, lethargy, passing out (08 Feb 2018 20:02)      HPI:Pt of Zuhair Alvarado  85 y/o male has productive cough, started few days ago, gradually getting worse, productive of sputum which has just turned to yellow, associated with painful swallowing as his throats hurts during swallowing, not eating properly since last couple of days, getting weaker. on 2/8 went to see his pmd and was prescribed tamiflu after diagnosing him with it, went home and was feeling lethargic. While he was eating he put his head on dinner table and was not able to respond to his wife and brought here.   -no aggravating factor, no relieving factor for above, here in ER fever noted (08 Feb 2018 20:02)      I was asked to see pt for dysphagia with pills  pt has been coughin and felt that K pill caused irritation in throat  has recurrent coughing and has tolerated other foods  neg reflux        PAST MEDICAL & SURGICAL HISTORY:  CAD S/P percutaneous coronary angioplasty  Prostate cancer: s/p radiation  Palpitations  NSVT (nonsustained ventricular tachycardia)  GERD (gastroesophageal reflux disease)  HLD (hyperlipidemia)  HTN (hypertension)  BPH (benign prostatic hyperplasia)  S/P cardiac catheterization: diagnostic 12/15/14      MEDICATIONS  (STANDING):  atorvastatin 40 milliGRAM(s) Oral at bedtime  azithromycin  IVPB 500 milliGRAM(s) IV Intermittent every 24 hours  azithromycin  IVPB      cefTRIAXone Injectable.      cefTRIAXone Injectable. 1000 milliGRAM(s) IV Push every 24 hours  clopidogrel Tablet 75 milliGRAM(s) Oral daily  enoxaparin Injectable 40 milliGRAM(s) SubCutaneous every 24 hours  nystatin    Suspension [id number] Unit(s) Swish and Swallow three times a day  oseltamivir 30 milliGRAM(s) Oral every 12 hours  potassium chloride    Tablet ER 40 milliEquivalent(s) Oral once  sodium chloride 0.9%. 1000 milliLiter(s) (75 mL/Hr) IV Continuous <Continuous>  tamsulosin 0.4 milliGRAM(s) Oral at bedtime    MEDICATIONS  (PRN):      Allergies    No Known Allergies    Intolerances        SOCIAL HISTORY:neg drugs    FAMILY HISTORY:  Family history of uterine cancer (Mother)  Family history of MI (myocardial infarction) (Father)      REVIEW OF SYSTEMS:    CONSTITUTIONAL: No weakness, fevers or chills  EYES/ENT: No visual changes;  No vertigo or throat pain   NECK: No pain or stiffness  RESPIRATORY: No cough, wheezing, hemoptysis; No shortness of breath  CARDIOVASCULAR: No chest pain or palpitations  GENITOURINARY: No dysuria, frequency or hematuria  NEUROLOGICAL: No numbness or weakness  SKIN: No itching, burning, rashes, or lesions   All other review of systems is negative unless indicated above.    Vital Signs Last 24 Hrs  T(C): 36.6 (10 Feb 2018 11:20), Max: 37.5 (10 Feb 2018 05:06)  T(F): 97.8 (10 Feb 2018 11:20), Max: 99.5 (10 Feb 2018 05:06)  HR: 67 (10 Feb 2018 12:09) (64 - 130)  BP: 117/48 (10 Feb 2018 12:09) (91/50 - 120/45)  BP(mean): --  RR: 17 (10 Feb 2018 11:20) (17 - 18)  SpO2: 97% (10 Feb 2018 11:20) (96% - 97%)    PHYSICAL EXAM:    Constitutional: NAD, well-developed  HEENT: EOMI, throat clear  Neck: No LAD, supple  Respiratory: CTA and P  Cardiovascular: S1 and S2, RRR, no M  Gastrointestinal: BS+, soft, NT/ND, neg HSM,  Extremities: No peripheral edema, neg clubing, cyanosis  Vascular: 2+ peripheral pulses  Neurological: A/O x 3, no focal deficits  Psychiatric: Normal mood, normal affect  Skin: No rashes    LABS:  CBC Full  -  ( 10 Feb 2018 05:26 )  WBC Count : 5.6 K/uL  Hemoglobin : 10.6 g/dL  Hematocrit : 32.2 %  Platelet Count - Automated : 166 K/uL  Mean Cell Volume : 88.8 fl  Mean Cell Hemoglobin : 29.1 pg  Mean Cell Hemoglobin Concentration : 32.8 gm/dL  Auto Neutrophil # : x  Auto Lymphocyte # : x  Auto Monocyte # : x  Auto Eosinophil # : x  Auto Basophil # : x  Auto Neutrophil % : x  Auto Lymphocyte % : x  Auto Monocyte % : x  Auto Eosinophil % : x  Auto Basophil % : x    02-10    140  |  109<H>  |  7   ----------------------------<  100<H>  3.4<L>   |  25  |  0.67    Ca    7.5<L>      10 Feb 2018 05:26  Mg     2.1     02-10    TPro  7.8  /  Alb  3.5  /  TBili  0.5  /  DBili  x   /  AST  33  /  ALT  21  /  AlkPhos  87  02-08    PTT - ( 08 Feb 2018 15:38 )  PTT:28.0 sec        RADIOLOGY & ADDITIONAL STUDIES:  < from: CT Chest No Cont (02.08.18 @ 20:36) >  EXAM:  CT CHEST                            PROCEDURE DATE:  02/08/2018          INTERPRETATION:  CT CHEST    HISTORY:  fever, cough                     TECHNIQUE: Noncontrast CT of the chest was performed. Coronal and   sagittal images were reconstructed. This study was performed using   automatic exposure control (radiation dose reduction software) to obtain   a diagnostic image quality scan with patient dose as low as reasonably   achievable.    COMPARISON: Chest x-ray from the same day    FINDINGS:    LUNGS, AIRWAYS: The central airways are patent. Patchy left lower lobe   airspace disease.    PLEURA: No pleural abnormality.    HEART AND VESSELS: Normal heart size. No pericardial effusion. Coronary   and aortic atherosclerosis without aneurysm.    MEDIASTINUM AND PACO: No adenopathy.    UPPER ABDOMEN: Gallstones. Submucosal fat in the stomach suggestive of   chronic gastritis.    BONES AND SOFT TISSUES: No acute bony abnormality.    IMPRESSION:     Patchy left lower lobe pneumonia.          < end of copied text >

## 2018-02-10 NOTE — PROGRESS NOTE ADULT - SUBJECTIVE AND OBJECTIVE BOX
HOSPITAL COURSE AND ASSESSMENT  85 yo M with PMH HTN, NSVT, formal smoker, BPH, Prostate ca (s/p radiation) NSTEMI s/p successful PCI x2 JEVON LAD and 1 JEVON LM in 2014 at [facility] presented to ED on 2/8 with c/o worsening productive cough yellow sputum, started few days prior a/w painful swallowing as his throats hurts during swallowing, not eating properly since last couple of days, getting weaker. on 2/8 went to see his pmd and was prescribed tamiflu after diagnosing him with FLU went home and was feeling lethargic. While he was eating he put his head on dinner table and was not able to respond to his wife at which time she brought him to the ED.     In ED he was noted to be febrile and lactate elevated. He was admitted to telemetry and treated for influenza. Cardiology was involved for SVT and his medications were adjusted.         Sepsis in the setting of superimposed CAP / influenza (unknown type, outside testing)  - blood culture no growth   - lactate initially 2.3 but repeat w/in norm at 1.0  - azithromycin ceftriaxone / tamiflu (chart review notable for +FLU with PMD eval day of adm) day 3  - wean O2    SVT  -medications adjusted by cardiology    hypovolemic hyponatremia, mild  -resolved    Weakness / ? Syncope  - mental status at baseline  - Head CT devoid of acute pathology  - ECG/TELE NSR, no acute ischemic / rhythm abnormalities    CAD  - s/p PCI 2014 as in HPI  - chest pain free/ TELE /ECG NSR no ischemic changes    Oropharyngeal Candidiasis  - oral nystatin swish and swallow    BPH  - flomax    Dispo  - full code  - discharge in 1-2 days with HR controlled    ----------------------------------------------------------------------------------------------  CHIEF COMPLAINT:  flu    SUBJECTIVE:   tolerating PO. OOB. oxygen weaned this AM then replaced after episode of SVT    REVIEW OF SYSTEMS:  All other review of systems is negative unless indicated above    Vital Signs Last 24 Hrs  T(C): 36.6 (10 Feb 2018 11:20), Max: 37.5 (10 Feb 2018 05:06)  T(F): 97.8 (10 Feb 2018 11:20), Max: 99.5 (10 Feb 2018 05:06)  HR: 67 (10 Feb 2018 12:09) (64 - 130)  BP: 117/48 (10 Feb 2018 12:09) (91/50 - 120/45)  BP(mean): --  RR: 17 (10 Feb 2018 11:20) (17 - 18)  SpO2: 97% (10 Feb 2018 11:20) (96% - 97%)  CAPILLARY BLOOD GLUCOSE      POCT Blood Glucose.: 110 mg/dL (10 Feb 2018 09:12)      PHYSICAL EXAM:  Constitutional: NAD, NCAT  HEENT: EOMI, Normal Hearing, MMM, fair dentition  Neck: trachea midline, No JVD  Respiratory: Breath sounds are clear, unlabored respiration  Cardiovascular: S1 and S2, regular rate   Gastrointestinal: Bowel Sounds present, soft, nontender, nondistended  Extremities: No peripheral edema  Vascular: 2+ radial pulse  Neurological: awake, alert, follows commands, sensation grossly intact  Psych: appropriate affect,  insight  Musculoskeletal: moves all extremities  Skin: No rashes    ALLERGIES  No Known Allergies      MEDICATIONS  (STANDING):  atorvastatin 40 milliGRAM(s) Oral at bedtime  azithromycin  IVPB 500 milliGRAM(s) IV Intermittent every 24 hours  azithromycin  IVPB      cefTRIAXone Injectable.      cefTRIAXone Injectable. 1000 milliGRAM(s) IV Push every 24 hours  clopidogrel Tablet 75 milliGRAM(s) Oral daily  enoxaparin Injectable 40 milliGRAM(s) SubCutaneous every 24 hours  nystatin    Suspension 338260 Unit(s) Swish and Swallow three times a day  oseltamivir 30 milliGRAM(s) Oral every 12 hours  sodium chloride 0.9%. 1000 milliLiter(s) (75 mL/Hr) IV Continuous <Continuous>  tamsulosin 0.4 milliGRAM(s) Oral at bedtime      LABS: All Labs Reviewed:                        10.6   5.6   )-----------( 166      ( 10 Feb 2018 05:26 )             32.2     02-10    140  |  109<H>  |  7   ----------------------------<  100<H>  3.4<L>   |  25  |  0.67    Ca    7.5<L>      10 Feb 2018 05:26  Mg     2.1     02-10    TPro  7.8  /  Alb  3.5  /  TBili  0.5  /  DBili  x   /  AST  33  /  ALT  21  /  AlkPhos  87  02-08    PTT - ( 08 Feb 2018 15:38 )  PTT:28.0 sec  CARDIAC MARKERS ( 08 Feb 2018 20:19 )  0.042 ng/mL / x     / x     / x     / x      CARDIAC MARKERS ( 08 Feb 2018 16:56 )  0.030 ng/mL / x     / x     / x     / x          Blood Culture: 02-08 @ 20:19  Organism --  Gram Stain Blood -- Gram Stain --  Specimen Source .Blood None  Culture-Blood --    02-08 @ 15:38  Organism --  Gram Stain Blood -- Gram Stain --  Specimen Source .Blood Blood-Peripheral  Culture-Blood --

## 2018-02-10 NOTE — CONSULT NOTE ADULT - ASSESSMENT
Sepsis in the setting of superimposed bacterial infection/ pneumonia / influenza A  - he feels improved from on admission      dysphagia due to irritation nad coughing  DW pt, using fluids with pills    I will sign off

## 2018-02-10 NOTE — PROGRESS NOTE ADULT - ASSESSMENT
86 year old man with influenza, with syncopal episode/ AMS       recommend hydration and checking of orthostatics-     recommend Tamiflu and supportive care    can check 2 d echo, will follow     SVT- no improvement in HR with 2 bolus of metoprolol, will give digoxin and monitor

## 2018-02-11 ENCOUNTER — TRANSCRIPTION ENCOUNTER (OUTPATIENT)
Age: 83
End: 2018-02-11

## 2018-02-11 VITALS
RESPIRATION RATE: 18 BRPM | DIASTOLIC BLOOD PRESSURE: 54 MMHG | SYSTOLIC BLOOD PRESSURE: 153 MMHG | OXYGEN SATURATION: 96 % | HEART RATE: 65 BPM | TEMPERATURE: 98 F

## 2018-02-11 LAB
ANION GAP SERPL CALC-SCNC: 4 MMOL/L — LOW (ref 5–17)
BUN SERPL-MCNC: 7 MG/DL — SIGNIFICANT CHANGE UP (ref 7–23)
CALCIUM SERPL-MCNC: 7.3 MG/DL — LOW (ref 8.5–10.1)
CHLORIDE SERPL-SCNC: 111 MMOL/L — HIGH (ref 96–108)
CO2 SERPL-SCNC: 26 MMOL/L — SIGNIFICANT CHANGE UP (ref 22–31)
CREAT SERPL-MCNC: 0.62 MG/DL — SIGNIFICANT CHANGE UP (ref 0.5–1.3)
GLUCOSE SERPL-MCNC: 106 MG/DL — HIGH (ref 70–99)
POTASSIUM SERPL-MCNC: 3.4 MMOL/L — LOW (ref 3.5–5.3)
POTASSIUM SERPL-SCNC: 3.4 MMOL/L — LOW (ref 3.5–5.3)
SODIUM SERPL-SCNC: 141 MMOL/L — SIGNIFICANT CHANGE UP (ref 135–145)

## 2018-02-11 RX ORDER — METOPROLOL TARTRATE 50 MG
12.5 TABLET ORAL ONCE
Qty: 0 | Refills: 0 | Status: COMPLETED | OUTPATIENT
Start: 2018-02-11 | End: 2018-02-11

## 2018-02-11 RX ORDER — METOPROLOL TARTRATE 50 MG
25 TABLET ORAL ONCE
Qty: 0 | Refills: 0 | Status: DISCONTINUED | OUTPATIENT
Start: 2018-02-11 | End: 2018-02-11

## 2018-02-11 RX ORDER — CEFUROXIME AXETIL 250 MG
1 TABLET ORAL
Qty: 10 | Refills: 0
Start: 2018-02-11 | End: 2018-02-15

## 2018-02-11 RX ORDER — METOPROLOL TARTRATE 50 MG
12.5 TABLET ORAL
Qty: 0 | Refills: 0 | Status: DISCONTINUED | OUTPATIENT
Start: 2018-02-11 | End: 2018-02-11

## 2018-02-11 RX ADMIN — Medication 30 MILLIGRAM(S): at 05:42

## 2018-02-11 RX ADMIN — Medication 500000 UNIT(S): at 05:42

## 2018-02-11 RX ADMIN — Medication 12.5 MILLIGRAM(S): at 11:02

## 2018-02-11 RX ADMIN — CLOPIDOGREL BISULFATE 75 MILLIGRAM(S): 75 TABLET, FILM COATED ORAL at 11:02

## 2018-02-11 NOTE — DISCHARGE NOTE ADULT - MEDICATION SUMMARY - MEDICATIONS TO TAKE
I will START or STAY ON the medications listed below when I get home from the hospital:    aspirin 81 mg oral delayed release tablet  -- 1 tab(s) by mouth once a day  -- Indication: For heart health    Flomax 0.4 mg oral capsule  -- 1 cap(s) by mouth once a day - home/hospital  -- Indication: For prostate    atorvastatin 40 mg oral tablet  -- 1 tab(s) by mouth once a day (at bedtime)  -- Indication: For cholesterol    Plavix 75 mg oral tablet  -- 1 tab(s) by mouth once a day - hospital  -- Indication: For hear health    oseltamivir 30 mg oral capsule  -- 1 cap(s) by mouth every 12 hours  -- Indication: For Flu    metoprolol tartrate 25 mg oral tablet  -- 1 tab(s) by mouth 2 times a day  -- Indication: For blood pressure, heart rate    cefuroxime 500 mg oral tablet  -- 1 tab(s) by mouth every 12 hours   -- Finish all this medication unless otherwise directed by prescriber.  Medication should be taken with plenty of water.  Take with food or milk.    -- Indication: For antibiotic for pneumonia

## 2018-02-11 NOTE — PROGRESS NOTE ADULT - ASSESSMENT
86 year old man with influenza, with syncopal episode/ AMS       recommend hydration and checking of orthostatics-     recommend Tamiflu and supportive care    can check 2 d echo( can do as out patient, will follow     SVT- improved after digoxin, will continue with low dose metoprolol      if he remains hemodynamically stable, can discharge from cardiac perspective

## 2018-02-11 NOTE — DISCHARGE NOTE ADULT - HOSPITAL COURSE
87 yo M with PMH HTN, NSVT, formal smoker, BPH, Prostate ca (s/p radiation) NSTEMI s/p successful PCI x2 JEVON LAD and 1 JEVON LM in 2014 at [facility] presented to ED on 2/8 with c/o worsening productive cough yellow sputum, started few days prior a/w painful swallowing as his throats hurts during swallowing, not eating properly since last couple of days, getting weaker. on 2/8 went to see his pmd and was prescribed tamiflu after diagnosing him with FLU went home and was feeling lethargic. While he was eating he put his head on dinner table and was not able to respond to his wife at which time she brought him to the ED.     In ED he was noted to be febrile and lactate elevated. He was admitted to telemetry and treated for influenza. Cardiology was involved for SVT and his medications were adjusted. He was medically stable for discharge and in agreement with this plan.         Sepsis in the setting of superimposed CAP on influenza (unknown type, outside testing)  - blood culture no growth   - lactate initially 2.3 but repeat w/in norm at 1.0  - azithromycin ceftriaxone / tamiflu (chart review notable for +FLU with PMD eval day of adm) day 3  - weaned O2    persistent SVT  -medications adjusted by cardiology    hypovolemic hyponatremia, mild  -resolved    Weakness / ? Syncope  - mental status at baseline  - Head CT devoid of acute pathology  - ECG/TELE NSR, no acute ischemic / rhythm abnormalities    CAD  - s/p PCI 2014 as in HPI  - chest pain free/ TELE /ECG NSR no ischemic changes    Oropharyngeal Candidiasis  - oral nystatin swish and swallow    BPH  - flomax    Dispo  - full code  - discharge in 1-2 days with HR controlled    total time 40 minutes, including coordination of care  GEN: NAD  EYES: eomi  CV: no edema  RESP: unlabored

## 2018-02-11 NOTE — DISCHARGE NOTE ADULT - CARE PLAN
Principal Discharge DX:	Flu  Goal:	to feel better  Assessment and plan of treatment:	Complete tamiflu.

## 2018-02-11 NOTE — DISCHARGE NOTE ADULT - VISION (WITH CORRECTIVE LENSES IF THE PATIENT USUALLY WEARS THEM):
glasses are at home/Partially impaired: cannot see medication labels or newsprint, but can see obstacles in path, and the surrounding layout; can count fingers at arm's length

## 2018-02-13 LAB
CULTURE RESULTS: SIGNIFICANT CHANGE UP
CULTURE RESULTS: SIGNIFICANT CHANGE UP
SPECIMEN SOURCE: SIGNIFICANT CHANGE UP
SPECIMEN SOURCE: SIGNIFICANT CHANGE UP

## 2018-02-14 LAB
CULTURE RESULTS: SIGNIFICANT CHANGE UP
SPECIMEN SOURCE: SIGNIFICANT CHANGE UP

## 2018-02-19 DIAGNOSIS — J18.9 PNEUMONIA, UNSPECIFIED ORGANISM: ICD-10-CM

## 2018-02-19 DIAGNOSIS — Z87.891 PERSONAL HISTORY OF NICOTINE DEPENDENCE: ICD-10-CM

## 2018-02-19 DIAGNOSIS — E87.1 HYPO-OSMOLALITY AND HYPONATREMIA: ICD-10-CM

## 2018-02-19 DIAGNOSIS — N40.0 BENIGN PROSTATIC HYPERPLASIA WITHOUT LOWER URINARY TRACT SYMPTOMS: ICD-10-CM

## 2018-02-19 DIAGNOSIS — Z85.46 PERSONAL HISTORY OF MALIGNANT NEOPLASM OF PROSTATE: ICD-10-CM

## 2018-02-19 DIAGNOSIS — I25.10 ATHEROSCLEROTIC HEART DISEASE OF NATIVE CORONARY ARTERY WITHOUT ANGINA PECTORIS: ICD-10-CM

## 2018-02-19 DIAGNOSIS — E78.5 HYPERLIPIDEMIA, UNSPECIFIED: ICD-10-CM

## 2018-02-19 DIAGNOSIS — R55 SYNCOPE AND COLLAPSE: ICD-10-CM

## 2018-02-19 DIAGNOSIS — Z95.5 PRESENCE OF CORONARY ANGIOPLASTY IMPLANT AND GRAFT: ICD-10-CM

## 2018-02-19 DIAGNOSIS — I10 ESSENTIAL (PRIMARY) HYPERTENSION: ICD-10-CM

## 2018-02-19 DIAGNOSIS — I25.2 OLD MYOCARDIAL INFARCTION: ICD-10-CM

## 2018-02-19 DIAGNOSIS — K21.9 GASTRO-ESOPHAGEAL REFLUX DISEASE WITHOUT ESOPHAGITIS: ICD-10-CM

## 2018-02-19 DIAGNOSIS — A41.9 SEPSIS, UNSPECIFIED ORGANISM: ICD-10-CM

## 2018-02-19 DIAGNOSIS — Z79.01 LONG TERM (CURRENT) USE OF ANTICOAGULANTS: ICD-10-CM

## 2018-02-19 DIAGNOSIS — Z79.82 LONG TERM (CURRENT) USE OF ASPIRIN: ICD-10-CM

## 2018-02-19 DIAGNOSIS — I47.1 SUPRAVENTRICULAR TACHYCARDIA: ICD-10-CM

## 2018-02-19 DIAGNOSIS — E86.0 DEHYDRATION: ICD-10-CM

## 2018-02-19 DIAGNOSIS — J10.1 INFLUENZA DUE TO OTHER IDENTIFIED INFLUENZA VIRUS WITH OTHER RESPIRATORY MANIFESTATIONS: ICD-10-CM

## 2019-01-31 PROBLEM — I25.10 ATHEROSCLEROTIC HEART DISEASE OF NATIVE CORONARY ARTERY WITHOUT ANGINA PECTORIS: Chronic | Status: ACTIVE | Noted: 2017-03-17

## 2019-02-07 ENCOUNTER — APPOINTMENT (OUTPATIENT)
Dept: DERMATOLOGY | Facility: CLINIC | Age: 84
End: 2019-02-07
Payer: MEDICARE

## 2019-02-07 VITALS — HEIGHT: 67 IN | BODY MASS INDEX: 24.48 KG/M2 | WEIGHT: 156 LBS

## 2019-02-07 DIAGNOSIS — Z00.00 ENCOUNTER FOR GENERAL ADULT MEDICAL EXAMINATION W/OUT ABNORMAL FINDINGS: ICD-10-CM

## 2019-02-07 DIAGNOSIS — D18.01 HEMANGIOMA OF SKIN AND SUBCUTANEOUS TISSUE: ICD-10-CM

## 2019-02-07 DIAGNOSIS — D22.9 MELANOCYTIC NEVI, UNSPECIFIED: ICD-10-CM

## 2019-02-07 DIAGNOSIS — L82.1 OTHER SEBORRHEIC KERATOSIS: ICD-10-CM

## 2019-02-07 DIAGNOSIS — Z86.79 PERSONAL HISTORY OF OTHER DISEASES OF THE CIRCULATORY SYSTEM: ICD-10-CM

## 2019-02-07 PROCEDURE — 99203 OFFICE O/P NEW LOW 30 MIN: CPT

## 2019-02-07 RX ORDER — METOPROLOL TARTRATE 25 MG/1
25 TABLET, FILM COATED ORAL
Qty: 30 | Refills: 0 | Status: DISCONTINUED | COMMUNITY
Start: 2018-08-24

## 2019-02-07 NOTE — PHYSICAL EXAM
[Alert] : alert [Oriented x 3] : ~L oriented x 3 [Well Nourished] : well nourished [Declined] : declined [Eyelids] : Eyelids [Ears] : Ears [Lips] : Lips [Neck] : Neck [FreeTextEntry3] : A skin exam was performed from the belt up including the scalp, face, neck, chest, abdomen, back and upper extremities.  The patient declined examination below the belt. \par The exam did not reveal any evidence of skin cancer, showing only the following benign growths:\par Dunn pigmented nevi - including 5 mm dome shaped hyperpigmented papule of the left posterior auricular region, c/w compound nevus.\par Seborrheic keratoses.\par Cherry angioma.\par

## 2019-02-07 NOTE — HISTORY OF PRESENT ILLNESS
[FreeTextEntry1] : Fit in for lesion, posterior to the left ear. [de-identified] : Present for 6 years, without growth, bleeding, tenderness or itching.

## 2019-02-16 NOTE — PATIENT PROFILE ADULT. - SOCIAL CONCERNS
76 yo F pmhx of HTN HLD and edema (on lasix) sent from PMD to r/o LLE DVT. Reports that she has had bilateral calf pain with walking x 1 week. Took aleeve with some improvement. No pain at rest. Denies associated sxs. Denies cp sob weakness HA dizziness orthopnea numbness tingling. None

## 2019-04-30 NOTE — DISCHARGE NOTE ADULT - ADMISSION DATE +STARTOFVISITDATE
DOS: 4/30/19    Pt previously seen by me for L chin sebaceous cyst, has started to become irritated and has started to develop a head. Had incision and drainage done in Urgent Care, but developed some pus and drainage afterwards, persistent. Started on ABx, less discharge. No other aggravating or alleviating factors noted.    On exam, L chin sebaceous cyst, about 1 cm in diameter, not inflamed or tender.     Assessment and Plan: plan for cyst excision under local anesthetic, risks bleeding/infection explained, will schedule for near future.   
Statement Selected

## 2020-10-16 ENCOUNTER — RESULT REVIEW (OUTPATIENT)
Age: 85
End: 2020-10-16

## 2021-08-20 ENCOUNTER — APPOINTMENT (OUTPATIENT)
Dept: ELECTROPHYSIOLOGY | Facility: CLINIC | Age: 86
End: 2021-08-20
Payer: MEDICARE

## 2021-08-20 ENCOUNTER — NON-APPOINTMENT (OUTPATIENT)
Age: 86
End: 2021-08-20

## 2021-08-20 VITALS
RESPIRATION RATE: 14 BRPM | BODY MASS INDEX: 23.86 KG/M2 | HEIGHT: 67 IN | OXYGEN SATURATION: 97 % | HEART RATE: 66 BPM | WEIGHT: 152 LBS | SYSTOLIC BLOOD PRESSURE: 174 MMHG | DIASTOLIC BLOOD PRESSURE: 73 MMHG

## 2021-08-20 DIAGNOSIS — R00.1 BRADYCARDIA, UNSPECIFIED: ICD-10-CM

## 2021-08-20 PROCEDURE — 99204 OFFICE O/P NEW MOD 45 MIN: CPT

## 2021-08-20 PROCEDURE — 93000 ELECTROCARDIOGRAM COMPLETE: CPT | Mod: 59

## 2021-08-20 RX ORDER — ROSUVASTATIN CALCIUM 10 MG/1
10 TABLET, FILM COATED ORAL
Qty: 30 | Refills: 0 | Status: DISCONTINUED | COMMUNITY
Start: 2018-08-24 | End: 2021-08-20

## 2021-09-13 NOTE — DISCUSSION/SUMMARY
[FreeTextEntry1] : 89-year-old male with history of DM2 HTN dyslipidemia BPH who complains of occasional palpitations lasting up to 20 minutes at times patient takes metoprolol XL 25 mg daily with some symptom improvement patient also has sinus bradycardia.\par HTN not well controlled start coreg 3.25mg bid and can increase gradually if needed avoid salt diet\par zio monitor for 2 weeks\par follow with pcp\par Total length of time spent with this patient was 45 minutes and more then half of the time was spent face to face with the patient as well as counseling and coordination of care.\par

## 2021-09-13 NOTE — HISTORY OF PRESENT ILLNESS
[FreeTextEntry1] : 89-year-old male with history of DM2 HTN dyslipidemia BPH who complains of occasional palpitations lasting up to 20 minutes at times patient takes metoprolol XL 25 mg daily with symptom improvement.\par Pt denies chest pain sob dizziness, he exercises routinely \par ECG 8.20.21 SR, frequent APC compensatory pause, 1st deg AVB \par

## 2021-10-04 ENCOUNTER — APPOINTMENT (OUTPATIENT)
Dept: ELECTROPHYSIOLOGY | Facility: CLINIC | Age: 86
End: 2021-10-04
Payer: MEDICARE

## 2021-10-04 VITALS
HEIGHT: 66 IN | BODY MASS INDEX: 24.43 KG/M2 | DIASTOLIC BLOOD PRESSURE: 64 MMHG | OXYGEN SATURATION: 99 % | HEART RATE: 61 BPM | WEIGHT: 152 LBS | SYSTOLIC BLOOD PRESSURE: 164 MMHG

## 2021-10-04 DIAGNOSIS — E11.9 TYPE 2 DIABETES MELLITUS W/OUT COMPLICATIONS: ICD-10-CM

## 2021-10-04 DIAGNOSIS — R00.2 PALPITATIONS: ICD-10-CM

## 2021-10-04 DIAGNOSIS — I10 ESSENTIAL (PRIMARY) HYPERTENSION: ICD-10-CM

## 2021-10-04 DIAGNOSIS — I47.1 SUPRAVENTRICULAR TACHYCARDIA: ICD-10-CM

## 2021-10-04 PROCEDURE — 99214 OFFICE O/P EST MOD 30 MIN: CPT

## 2021-10-04 RX ORDER — TAMSULOSIN HYDROCHLORIDE 0.4 MG/1
0.4 CAPSULE ORAL
Refills: 0 | Status: ACTIVE | COMMUNITY
Start: 2018-10-03

## 2021-10-04 RX ORDER — CARVEDILOL 3.12 MG/1
3.12 TABLET, FILM COATED ORAL TWICE DAILY
Qty: 60 | Refills: 3 | Status: DISCONTINUED | COMMUNITY
Start: 2021-08-20 | End: 2021-10-04

## 2021-10-04 RX ORDER — ATORVASTATIN CALCIUM 10 MG/1
10 TABLET, FILM COATED ORAL
Qty: 90 | Refills: 0 | Status: DISCONTINUED | COMMUNITY
Start: 2018-08-20 | End: 2021-10-04

## 2021-10-04 RX ORDER — METFORMIN HYDROCHLORIDE 500 MG/1
500 TABLET, COATED ORAL TWICE DAILY
Qty: 180 | Refills: 3 | Status: ACTIVE | COMMUNITY

## 2021-10-04 NOTE — DISCUSSION/SUMMARY
[FreeTextEntry1] : 89-year-old male with history of DM2 HTN dyslipidemia BPH who complains of occasional palpitations lasting up to 20 minutes at times patient takes coreg 3.25mg daily with some symptom improvement. He has some improvement in palpitatations with coreg still continues to have some SVT episodes. He takes prn metoprolol 12.5mg during palpitations. If he has more recurrent symptomatic SVT then would benefit from catheter ablation procedure for SVT.  \par HTN increase coreg 6.25mg bid low salt\par rtc 1y or earlier if symptoms\par \par Total length of time spent with this patient was 30 minutes and more then half of the time was spent face to face with the patient as well as counseling and coordination of care as stated above.\par

## 2021-10-04 NOTE — HISTORY OF PRESENT ILLNESS
[FreeTextEntry1] : 89-year-old male with history of DM2 HTN dyslipidemia BPH who complains of occasional palpitations lasting up to 20 minutes at times patient takes coreg 3.25mg daily with some symptom improvement. However still continues to have mild palpitations.\par Pt denies chest pain sob dizziness, he exercises routinely \par ECG 8.20.21 SR, frequent APC compensatory pause, 1st deg AVB \par \par Cardiac work-up:\par KEVIN from 8-9/2021 14-day monitor shows predominant sinus rhythm multiple episodes of SVT longest episode 16 minutes 50 seconds average heart rate 140 bpm no clear P waves (8/29 11:02 am) correlate with patient triggered event.

## 2022-05-17 RX ORDER — CARVEDILOL 6.25 MG/1
6.25 TABLET, FILM COATED ORAL TWICE DAILY
Qty: 180 | Refills: 0 | Status: ACTIVE | COMMUNITY
Start: 2021-10-04 | End: 1900-01-01

## 2022-06-23 NOTE — DISCHARGE NOTE ADULT - ADMISSION DATE +STARTOFVISITDATE
Results for orders placed or performed in visit on 06/23/22   Cardiac EP device report    Narrative    [employer] BIV ICD  NON-BILLABLE CARELINK TRANSMISSION: BATTERY STATUS: BATTERY VOLTAGE NEARING MADHURI (2 MOS)  WILL SCHEDULE MONTHLY BATTERY CHECKS  AP: 2%  : 97 4% + VSRP: 1 9%  ALL AVAILABLE LEAD PARAMETERS WITHIN NORMAL LIMITS (CHRONICALLY HIGH LV THRESHOLD)  NO SIGNIFICANT HIGH RATE EPISODES  80 V-SENSE EPISODES (4 5 MINS/D) W/ AVAIL MARKERS SHOWING PAT, SST W/ -143 BPM, MAX DURATION 38 SECS  AF BURDEN: 0%  PT TAKES METOPROLOL SUCC, ELIQUIS, ASA 81MG  EF: 35% (ECHO 7/16/21)  OPTI-VOL WITHIN NORMAL LIMITS  APPROPRIATELY FUNCTIONING BI-V ICD    [address] Statement Selected

## 2022-08-10 RX ORDER — NITROFURANTOIN MACROCRYSTAL 50 MG
1 CAPSULE ORAL
Qty: 0 | Refills: 0 | DISCHARGE
Start: 2022-08-10 | End: 2022-08-15

## 2022-08-11 ENCOUNTER — INPATIENT (INPATIENT)
Facility: HOSPITAL | Age: 87
LOS: 0 days | Discharge: HOME | DRG: 446 | End: 2022-08-12
Attending: SURGERY | Admitting: SURGERY
Payer: MEDICARE

## 2022-08-11 VITALS
HEIGHT: 66 IN | OXYGEN SATURATION: 98 % | WEIGHT: 175.05 LBS | HEART RATE: 81 BPM | DIASTOLIC BLOOD PRESSURE: 72 MMHG | RESPIRATION RATE: 18 BRPM | SYSTOLIC BLOOD PRESSURE: 127 MMHG | TEMPERATURE: 100 F

## 2022-08-11 DIAGNOSIS — Z98.89 OTHER SPECIFIED POSTPROCEDURAL STATES: Chronic | ICD-10-CM

## 2022-08-11 LAB
ALBUMIN SERPL ELPH-MCNC: 3.4 G/DL — SIGNIFICANT CHANGE UP (ref 3.3–5)
ALP SERPL-CCNC: 87 U/L — SIGNIFICANT CHANGE UP (ref 40–120)
ALT FLD-CCNC: 23 U/L — SIGNIFICANT CHANGE UP (ref 12–78)
ANION GAP SERPL CALC-SCNC: 5 MMOL/L — SIGNIFICANT CHANGE UP (ref 5–17)
APTT BLD: 26.9 SEC — LOW (ref 27.5–35.5)
AST SERPL-CCNC: 25 U/L — SIGNIFICANT CHANGE UP (ref 15–37)
BASOPHILS # BLD AUTO: 0.02 K/UL — SIGNIFICANT CHANGE UP (ref 0–0.2)
BASOPHILS NFR BLD AUTO: 0.1 % — SIGNIFICANT CHANGE UP (ref 0–2)
BILIRUB SERPL-MCNC: 0.4 MG/DL — SIGNIFICANT CHANGE UP (ref 0.2–1.2)
BUN SERPL-MCNC: 13 MG/DL — SIGNIFICANT CHANGE UP (ref 7–23)
CALCIUM SERPL-MCNC: 8.8 MG/DL — SIGNIFICANT CHANGE UP (ref 8.5–10.1)
CHLORIDE SERPL-SCNC: 101 MMOL/L — SIGNIFICANT CHANGE UP (ref 96–108)
CO2 SERPL-SCNC: 28 MMOL/L — SIGNIFICANT CHANGE UP (ref 22–31)
CREAT SERPL-MCNC: 0.92 MG/DL — SIGNIFICANT CHANGE UP (ref 0.5–1.3)
EGFR: 79 ML/MIN/1.73M2 — SIGNIFICANT CHANGE UP
EOSINOPHIL # BLD AUTO: 0 K/UL — SIGNIFICANT CHANGE UP (ref 0–0.5)
EOSINOPHIL NFR BLD AUTO: 0 % — SIGNIFICANT CHANGE UP (ref 0–6)
GLUCOSE SERPL-MCNC: 209 MG/DL — HIGH (ref 70–99)
HCT VFR BLD CALC: 35.5 % — LOW (ref 39–50)
HGB BLD-MCNC: 11.5 G/DL — LOW (ref 13–17)
IMM GRANULOCYTES NFR BLD AUTO: 0.3 % — SIGNIFICANT CHANGE UP (ref 0–1.5)
INR BLD: 1.07 RATIO — SIGNIFICANT CHANGE UP (ref 0.88–1.16)
LACTATE SERPL-SCNC: 2.5 MMOL/L — HIGH (ref 0.7–2)
LYMPHOCYTES # BLD AUTO: 0.76 K/UL — LOW (ref 1–3.3)
LYMPHOCYTES # BLD AUTO: 5.4 % — LOW (ref 13–44)
MCHC RBC-ENTMCNC: 28.8 PG — SIGNIFICANT CHANGE UP (ref 27–34)
MCHC RBC-ENTMCNC: 32.4 GM/DL — SIGNIFICANT CHANGE UP (ref 32–36)
MCV RBC AUTO: 89 FL — SIGNIFICANT CHANGE UP (ref 80–100)
MONOCYTES # BLD AUTO: 1.01 K/UL — HIGH (ref 0–0.9)
MONOCYTES NFR BLD AUTO: 7.2 % — SIGNIFICANT CHANGE UP (ref 2–14)
NEUTROPHILS # BLD AUTO: 12.12 K/UL — HIGH (ref 1.8–7.4)
NEUTROPHILS NFR BLD AUTO: 87 % — HIGH (ref 43–77)
PLATELET # BLD AUTO: 248 K/UL — SIGNIFICANT CHANGE UP (ref 150–400)
POTASSIUM SERPL-MCNC: 3.9 MMOL/L — SIGNIFICANT CHANGE UP (ref 3.5–5.3)
POTASSIUM SERPL-SCNC: 3.9 MMOL/L — SIGNIFICANT CHANGE UP (ref 3.5–5.3)
PROT SERPL-MCNC: 7.1 GM/DL — SIGNIFICANT CHANGE UP (ref 6–8.3)
PROTHROM AB SERPL-ACNC: 12.4 SEC — SIGNIFICANT CHANGE UP (ref 10.5–13.4)
RBC # BLD: 3.99 M/UL — LOW (ref 4.2–5.8)
RBC # FLD: 12.9 % — SIGNIFICANT CHANGE UP (ref 10.3–14.5)
SODIUM SERPL-SCNC: 134 MMOL/L — LOW (ref 135–145)
WBC # BLD: 13.95 K/UL — HIGH (ref 3.8–10.5)
WBC # FLD AUTO: 13.95 K/UL — HIGH (ref 3.8–10.5)

## 2022-08-11 PROCEDURE — 99053 MED SERV 10PM-8AM 24 HR FAC: CPT

## 2022-08-11 PROCEDURE — 99285 EMERGENCY DEPT VISIT HI MDM: CPT

## 2022-08-11 PROCEDURE — 71045 X-RAY EXAM CHEST 1 VIEW: CPT | Mod: 26

## 2022-08-11 RX ORDER — ACETAMINOPHEN 500 MG
650 TABLET ORAL ONCE
Refills: 0 | Status: COMPLETED | OUTPATIENT
Start: 2022-08-11 | End: 2022-08-11

## 2022-08-11 RX ORDER — SODIUM CHLORIDE 9 MG/ML
2500 INJECTION INTRAMUSCULAR; INTRAVENOUS; SUBCUTANEOUS ONCE
Refills: 0 | Status: COMPLETED | OUTPATIENT
Start: 2022-08-11 | End: 2022-08-11

## 2022-08-11 RX ORDER — PIPERACILLIN AND TAZOBACTAM 4; .5 G/20ML; G/20ML
3.38 INJECTION, POWDER, LYOPHILIZED, FOR SOLUTION INTRAVENOUS ONCE
Refills: 0 | Status: COMPLETED | OUTPATIENT
Start: 2022-08-11 | End: 2022-08-11

## 2022-08-11 RX ADMIN — PIPERACILLIN AND TAZOBACTAM 3.38 GRAM(S): 4; .5 INJECTION, POWDER, LYOPHILIZED, FOR SOLUTION INTRAVENOUS at 23:50

## 2022-08-11 RX ADMIN — Medication 650 MILLIGRAM(S): at 23:56

## 2022-08-11 RX ADMIN — Medication 650 MILLIGRAM(S): at 23:41

## 2022-08-11 RX ADMIN — SODIUM CHLORIDE 2500 MILLILITER(S): 9 INJECTION INTRAMUSCULAR; INTRAVENOUS; SUBCUTANEOUS at 23:20

## 2022-08-11 RX ADMIN — PIPERACILLIN AND TAZOBACTAM 200 GRAM(S): 4; .5 INJECTION, POWDER, LYOPHILIZED, FOR SOLUTION INTRAVENOUS at 23:20

## 2022-08-11 NOTE — ED PROVIDER NOTE - CLINICAL SUMMARY MEDICAL DECISION MAKING FREE TEXT BOX
89 y/o male with RUQ pain, vomiting, shaking, chills. R/o acute cholecystitis, cholangitis. Labs, fluids, pain control, US of RUQ

## 2022-08-11 NOTE — ED PROVIDER NOTE - NSICDXFAMILYHX_GEN_ALL_CORE_FT
FAMILY HISTORY:  Father  Still living? No  Family history of MI (myocardial infarction), Age at diagnosis: Age Unknown    Mother  Still living? No  Family history of uterine cancer, Age at diagnosis: Age Unknown

## 2022-08-11 NOTE — ED PROVIDER NOTE - PROGRESS NOTE DETAILS
Juanita MARES: Imaging reviewed and consulted surgery.  Surgery evaluated patient requesting HIDA scan and to admit to their service, Dr. Murray

## 2022-08-11 NOTE — ED PROVIDER NOTE - MUSCULOSKELETAL, MLM
Spine appears normal, range of motion is not limited, no muscle or joint tenderness. Tremoring, No CVA tenderness.

## 2022-08-11 NOTE — ED PROVIDER NOTE - NSICDXPASTMEDICALHX_GEN_ALL_CORE_FT
PAST MEDICAL HISTORY:  BPH (benign prostatic hyperplasia)     CAD S/P percutaneous coronary angioplasty     GERD (gastroesophageal reflux disease)     HLD (hyperlipidemia)     HTN (hypertension)     NSVT (nonsustained ventricular tachycardia)     Palpitations     Prostate cancer s/p radiation

## 2022-08-11 NOTE — ED PROVIDER NOTE - NS_ ATTENDINGSCRIBEDETAILS _ED_A_ED_FT
Mely Grant MD: The history, relevant review of systems, past medical and surgical history, medical decision making, and physical examination was documented by the scribe in my presence and I attest to the accuracy of the documentation.

## 2022-08-11 NOTE — ED PROVIDER NOTE - OBJECTIVE STATEMENT
89 y/o male with PMHx HTN, HLD, GERD, BPH, CAD s/p stent presents to the ED b/c he was recently diagnosed with UTI put on Macrobid and has been taking it the past few days, but then today developed right side abd pain, vomiting and chills. He states he feels cold. He was concerned b/c he took the dose early in the day and was supposed to take in evening. Pain radiates to right flank, no urinary symptoms

## 2022-08-11 NOTE — ED ADULT NURSE NOTE - OBJECTIVE STATEMENT
Patient A&Ox4, presents to the ED with c/o Right sided abdominal pain with N/V since this afternoon. Denies diarrhea. Pt being treated for a UTI X 2days with macrobid. Denies chest pain, SOB, and fevers.

## 2022-08-11 NOTE — ED PROVIDER NOTE - CARDIAC, MLM
Patient : Ileana French Age: 13 year old Sex: female   MRN: [id number] Encounter Date: 7/16/2017      History     Chief Complaint   Patient presents with   • Ankle Pain     Patient complains of right ankle pain after coming down awkwardly while bouncing on a trampoline. She states that she rolled her ankle and now has pain and swelling around the lateral malleolus. She rates her pain 9/10. Mother did administer some ibuprofen prior to arrival. The injury occurred about 2 hours prior to arrival. Pain is worse with weightbearing. She denies any other injury from the trampoline accident.            No Known Allergies    Prior to Admission Medications    IBUPROFEN (MOTRIN) 200 MG TABLET    Take 1 tablet by mouth every 6 hours as needed for Pain.       New Prescriptions    No medications on file       No past medical history on file.    No past surgical history on file.    No family history on file.    Social History   Substance Use Topics   • Smoking status: Never Smoker   • Smokeless tobacco: Never Used   • Alcohol use Not on file       Review of Systems   Constitutional: Negative for activity change, appetite change, chills, fatigue and fever.   HENT: Negative for congestion, rhinorrhea, sinus pressure and sore throat.    Eyes: Negative for photophobia and visual disturbance.   Respiratory: Negative for chest tightness and shortness of breath.    Cardiovascular: Negative for chest pain and palpitations.   Gastrointestinal: Negative for abdominal pain, diarrhea, nausea and vomiting.   Genitourinary: Negative for decreased urine volume, difficulty urinating, dysuria, flank pain and urgency.   Musculoskeletal: Positive for arthralgias, gait problem and joint swelling. Negative for back pain, myalgias, neck pain and neck stiffness.   Skin: Negative for color change, pallor, rash and wound.   Neurological: Negative for dizziness, syncope, weakness, light-headedness, numbness and headaches.   Hematological: Negative for  Normal rate, regular rhythm.  Heart sounds S1, S2.  No murmurs, rubs or gallops. adenopathy. Does not bruise/bleed easily.   Psychiatric/Behavioral: Negative for confusion. The patient is not nervous/anxious.        Physical Exam     ED Triage Vitals [07/16/17 1626]   ED Triage Vitals Group      Temp 98.1 °F (36.7 °C)      Heart Rate 82      Resp 16      /82      SpO2 99 %      EtCO2 mmHg       Height       Weight 130 lb 15.3 oz (59.4 kg)      Weight Scale Used ED Actual       Physical Exam   Constitutional: She is oriented to person, place, and time. She appears well-developed and well-nourished.  Non-toxic appearance. She does not have a sickly appearance. No distress.   HENT:   Head: Normocephalic and atraumatic.   Nose: Nose normal.   Mouth/Throat: Uvula is midline, oropharynx is clear and moist and mucous membranes are normal. Mucous membranes are not dry. No oropharyngeal exudate or posterior oropharyngeal erythema.   Eyes: Conjunctivae are normal. Pupils are equal, round, and reactive to light. Right eye exhibits no discharge. Left eye exhibits no discharge. No scleral icterus.   Neck: Trachea normal, normal range of motion and full passive range of motion without pain. Neck supple. No JVD present. No tracheal deviation present.   Cardiovascular: Normal rate, regular rhythm, normal heart sounds, intact distal pulses and normal pulses.    No murmur heard.  Pulmonary/Chest: Effort normal and breath sounds normal. No accessory muscle usage or stridor. No tachypnea. No respiratory distress. She has no wheezes. She has no rales. She exhibits no tenderness.   Abdominal: Normal appearance. There is no splenomegaly or hepatomegaly. There is no rigidity, no CVA tenderness, no tenderness at McBurney's point and negative Brown's sign.   Musculoskeletal: Normal range of motion. She exhibits no edema.        Right hip: Normal.        Left hip: Normal.        Right knee: Normal.        Left knee: Normal.        Right ankle: She exhibits swelling. Tenderness. Lateral malleolus tenderness found.         Left ankle: Normal.   Neurological: She is alert and oriented to person, place, and time. She is not disoriented.   Skin: Skin is warm, dry and intact. No ecchymosis and no rash noted. She is not diaphoretic. No cyanosis or erythema. No pallor.   Psychiatric: She has a normal mood and affect. Her behavior is normal.   Nursing note and vitals reviewed.      ED Course     Procedures      Radiology Results     Imaging Results          XR Ankle 3+ View Right (Final result)  Result time 07/16/17 17:04:13    Final result                 Impression:    IMPRESSION: Right ankle lateral soft tissue swelling. Compared to the  distal tibia, distal fibular growth plate is minimally wider. This can be  normal or could represent minimal posttraumatic diastases. Contralateral  views of the left ankle could be considered for comparison.               Narrative:    XR ANKLE 3+ VW RIGHT    HISTORY: trauma    TECHNIQUE: Frontal, oblique and lateral views are provided for review.    COMPARISON:  None.    FINDINGS:    Please see impression.                            Radiograph of the right ankle was reviewed, independently interpreted. No obvious fracture identified    ED Medication Orders     None          MDM  Although no fracture was seen on the ankle radiograph, I cannot exclude injury through the growth plate.  Plan splinting and non weight bearing, follow up with ortho in 1-2 weeks.    Clinical Impression     ED Diagnosis   1. Right ankle pain, unspecified chronicity         Disposition        Discharge 7/16/2017  5:20 PM  Ileana French discharge to home/self care.                    Reggie Gandhi MD  07/16/17 3055

## 2022-08-12 ENCOUNTER — TRANSCRIPTION ENCOUNTER (OUTPATIENT)
Age: 87
End: 2022-08-12

## 2022-08-12 VITALS
RESPIRATION RATE: 18 BRPM | DIASTOLIC BLOOD PRESSURE: 53 MMHG | HEART RATE: 84 BPM | SYSTOLIC BLOOD PRESSURE: 158 MMHG | TEMPERATURE: 98 F | OXYGEN SATURATION: 96 %

## 2022-08-12 DIAGNOSIS — K81.0 ACUTE CHOLECYSTITIS: ICD-10-CM

## 2022-08-12 LAB
-  BLOOD PCR PANEL: SIGNIFICANT CHANGE UP
ALBUMIN SERPL ELPH-MCNC: 2.8 G/DL — LOW (ref 3.3–5)
ALP SERPL-CCNC: 64 U/L — SIGNIFICANT CHANGE UP (ref 40–120)
ALT FLD-CCNC: 27 U/L — SIGNIFICANT CHANGE UP (ref 12–78)
ANION GAP SERPL CALC-SCNC: 7 MMOL/L — SIGNIFICANT CHANGE UP (ref 5–17)
APPEARANCE UR: CLEAR — SIGNIFICANT CHANGE UP
AST SERPL-CCNC: 40 U/L — HIGH (ref 15–37)
BILIRUB SERPL-MCNC: 0.3 MG/DL — SIGNIFICANT CHANGE UP (ref 0.2–1.2)
BILIRUB UR-MCNC: NEGATIVE — SIGNIFICANT CHANGE UP
BUN SERPL-MCNC: 10 MG/DL — SIGNIFICANT CHANGE UP (ref 7–23)
CALCIUM SERPL-MCNC: 8.4 MG/DL — LOW (ref 8.5–10.1)
CHLORIDE SERPL-SCNC: 103 MMOL/L — SIGNIFICANT CHANGE UP (ref 96–108)
CO2 SERPL-SCNC: 24 MMOL/L — SIGNIFICANT CHANGE UP (ref 22–31)
COLOR SPEC: YELLOW — SIGNIFICANT CHANGE UP
CREAT SERPL-MCNC: 0.73 MG/DL — SIGNIFICANT CHANGE UP (ref 0.5–1.3)
DIFF PNL FLD: ABNORMAL
EGFR: 86 ML/MIN/1.73M2 — SIGNIFICANT CHANGE UP
FLUAV AG NPH QL: SIGNIFICANT CHANGE UP
FLUBV AG NPH QL: SIGNIFICANT CHANGE UP
GLUCOSE SERPL-MCNC: 174 MG/DL — HIGH (ref 70–99)
GLUCOSE UR QL: 100 MG/DL
GRAM STN FLD: SIGNIFICANT CHANGE UP
HCT VFR BLD CALC: 32.8 % — LOW (ref 39–50)
HGB BLD-MCNC: 10.7 G/DL — LOW (ref 13–17)
KETONES UR-MCNC: ABNORMAL
LEUKOCYTE ESTERASE UR-ACNC: NEGATIVE — SIGNIFICANT CHANGE UP
LIDOCAIN IGE QN: 51 U/L — LOW (ref 73–393)
MAGNESIUM SERPL-MCNC: 1.7 MG/DL — SIGNIFICANT CHANGE UP (ref 1.6–2.6)
MCHC RBC-ENTMCNC: 28.4 PG — SIGNIFICANT CHANGE UP (ref 27–34)
MCHC RBC-ENTMCNC: 32.6 GM/DL — SIGNIFICANT CHANGE UP (ref 32–36)
MCV RBC AUTO: 87 FL — SIGNIFICANT CHANGE UP (ref 80–100)
METHOD TYPE: SIGNIFICANT CHANGE UP
NITRITE UR-MCNC: NEGATIVE — SIGNIFICANT CHANGE UP
PH UR: 7 — SIGNIFICANT CHANGE UP (ref 5–8)
PHOSPHATE SERPL-MCNC: 2.9 MG/DL — SIGNIFICANT CHANGE UP (ref 2.5–4.5)
PLATELET # BLD AUTO: 221 K/UL — SIGNIFICANT CHANGE UP (ref 150–400)
POTASSIUM SERPL-MCNC: 3.5 MMOL/L — SIGNIFICANT CHANGE UP (ref 3.5–5.3)
POTASSIUM SERPL-SCNC: 3.5 MMOL/L — SIGNIFICANT CHANGE UP (ref 3.5–5.3)
PROT SERPL-MCNC: 6.1 GM/DL — SIGNIFICANT CHANGE UP (ref 6–8.3)
PROT UR-MCNC: SIGNIFICANT CHANGE UP MG/DL
RBC # BLD: 3.77 M/UL — LOW (ref 4.2–5.8)
RBC # FLD: 13.1 % — SIGNIFICANT CHANGE UP (ref 10.3–14.5)
RSV RNA NPH QL NAA+NON-PROBE: SIGNIFICANT CHANGE UP
SARS-COV-2 RNA SPEC QL NAA+PROBE: SIGNIFICANT CHANGE UP
SODIUM SERPL-SCNC: 134 MMOL/L — LOW (ref 135–145)
SP GR SPEC: 1.01 — SIGNIFICANT CHANGE UP (ref 1.01–1.02)
SPECIMEN SOURCE: SIGNIFICANT CHANGE UP
UROBILINOGEN FLD QL: NEGATIVE — SIGNIFICANT CHANGE UP
WBC # BLD: 19.35 K/UL — HIGH (ref 3.8–10.5)
WBC # FLD AUTO: 19.35 K/UL — HIGH (ref 3.8–10.5)

## 2022-08-12 PROCEDURE — 36415 COLL VENOUS BLD VENIPUNCTURE: CPT

## 2022-08-12 PROCEDURE — A9537: CPT

## 2022-08-12 PROCEDURE — 93010 ELECTROCARDIOGRAM REPORT: CPT

## 2022-08-12 PROCEDURE — 76705 ECHO EXAM OF ABDOMEN: CPT | Mod: 26

## 2022-08-12 PROCEDURE — 84100 ASSAY OF PHOSPHORUS: CPT

## 2022-08-12 PROCEDURE — 85027 COMPLETE CBC AUTOMATED: CPT

## 2022-08-12 PROCEDURE — 74177 CT ABD & PELVIS W/CONTRAST: CPT | Mod: 26,MA

## 2022-08-12 PROCEDURE — 78226 HEPATOBILIARY SYSTEM IMAGING: CPT

## 2022-08-12 PROCEDURE — 80053 COMPREHEN METABOLIC PANEL: CPT

## 2022-08-12 PROCEDURE — 93306 TTE W/DOPPLER COMPLETE: CPT | Mod: 26

## 2022-08-12 PROCEDURE — 78226 HEPATOBILIARY SYSTEM IMAGING: CPT | Mod: 26

## 2022-08-12 PROCEDURE — 93306 TTE W/DOPPLER COMPLETE: CPT

## 2022-08-12 PROCEDURE — 83735 ASSAY OF MAGNESIUM: CPT

## 2022-08-12 PROCEDURE — C9113: CPT

## 2022-08-12 PROCEDURE — 83690 ASSAY OF LIPASE: CPT

## 2022-08-12 RX ORDER — MORPHINE SULFATE 50 MG/1
2 CAPSULE, EXTENDED RELEASE ORAL EVERY 4 HOURS
Refills: 0 | Status: DISCONTINUED | OUTPATIENT
Start: 2022-08-12 | End: 2022-08-12

## 2022-08-12 RX ORDER — PANTOPRAZOLE SODIUM 20 MG/1
40 TABLET, DELAYED RELEASE ORAL DAILY
Refills: 0 | Status: DISCONTINUED | OUTPATIENT
Start: 2022-08-12 | End: 2022-08-12

## 2022-08-12 RX ORDER — SODIUM CHLORIDE 9 MG/ML
1000 INJECTION INTRAMUSCULAR; INTRAVENOUS; SUBCUTANEOUS
Refills: 0 | Status: DISCONTINUED | OUTPATIENT
Start: 2022-08-12 | End: 2022-08-12

## 2022-08-12 RX ORDER — TAMSULOSIN HYDROCHLORIDE 0.4 MG/1
0.4 CAPSULE ORAL AT BEDTIME
Refills: 0 | Status: DISCONTINUED | OUTPATIENT
Start: 2022-08-12 | End: 2022-08-12

## 2022-08-12 RX ORDER — ACETAMINOPHEN 500 MG
1000 TABLET ORAL ONCE
Refills: 0 | Status: DISCONTINUED | OUTPATIENT
Start: 2022-08-12 | End: 2022-08-12

## 2022-08-12 RX ORDER — PIPERACILLIN AND TAZOBACTAM 4; .5 G/20ML; G/20ML
3.38 INJECTION, POWDER, LYOPHILIZED, FOR SOLUTION INTRAVENOUS EVERY 8 HOURS
Refills: 0 | Status: DISCONTINUED | OUTPATIENT
Start: 2022-08-12 | End: 2022-08-12

## 2022-08-12 RX ORDER — ONDANSETRON 8 MG/1
4 TABLET, FILM COATED ORAL EVERY 6 HOURS
Refills: 0 | Status: DISCONTINUED | OUTPATIENT
Start: 2022-08-12 | End: 2022-08-12

## 2022-08-12 RX ORDER — METOPROLOL TARTRATE 50 MG
25 TABLET ORAL
Refills: 0 | Status: DISCONTINUED | OUTPATIENT
Start: 2022-08-12 | End: 2022-08-12

## 2022-08-12 RX ADMIN — MORPHINE SULFATE 2 MILLIGRAM(S): 50 CAPSULE, EXTENDED RELEASE ORAL at 04:11

## 2022-08-12 RX ADMIN — PIPERACILLIN AND TAZOBACTAM 25 GRAM(S): 4; .5 INJECTION, POWDER, LYOPHILIZED, FOR SOLUTION INTRAVENOUS at 06:50

## 2022-08-12 RX ADMIN — PANTOPRAZOLE SODIUM 40 MILLIGRAM(S): 20 TABLET, DELAYED RELEASE ORAL at 09:38

## 2022-08-12 RX ADMIN — SODIUM CHLORIDE 120 MILLILITER(S): 9 INJECTION INTRAMUSCULAR; INTRAVENOUS; SUBCUTANEOUS at 04:11

## 2022-08-12 RX ADMIN — SODIUM CHLORIDE 2500 MILLILITER(S): 9 INJECTION INTRAMUSCULAR; INTRAVENOUS; SUBCUTANEOUS at 00:20

## 2022-08-12 NOTE — H&P ADULT - ASSESSMENT
89 year old male presents with acute cholecystitis.    Plan:  Pain and nausea control PRN  Routine vitals  Incentive spirometry  NPO  IV fluids  GI ppx  Replete electrolytes PRN  DVT ppx: venodynes  OOBC, ambulation  Plan for laparoscopic cholecystectomy, possible open  Cardiac clearance needed    Plan discussed with Dr. Murray. 89 year old male presents with possible acute cholecystitis. Patient is an advanced age and has a cardiac hx, he is high risk for surgery.     Plan:  Pain and nausea control PRN  Routine vitals  Incentive spirometry  NPO  IV fluids  GI ppx  Replete electrolytes PRN  DVT ppx: venodynes  OOBC, ambulation  Recommend HIDA scan, if + then cholecystostomy tube  Cardiac clearance needed    Plan discussed with Dr. Murray.

## 2022-08-12 NOTE — DISCHARGE NOTE PROVIDER - NSDCMRMEDTOKEN_GEN_ALL_CORE_FT
atorvastatin 40 mg oral tablet: 1 tab(s) orally once a day  *has med but does not take*  calcium (as carbonate) 600 mg oral tablet: 1 tab(s) orally once a day  clopidogrel 75 mg oral tablet: 1 tab(s) orally once a day  metFORMIN 500 mg oral tablet: 1 tab(s) orally 2 times a day  metoprolol tartrate 25 mg oral tablet: 1 tab(s) orally 2 times a day  Multiple Vitamins oral tablet: 1 tab(s) orally once a day  nitrofurantoin macrocrystals-monohydrate 100 mg oral capsule: 1 cap(s) orally 2 times a day  tamsulosin 0.4 mg oral capsule: 1 cap(s) orally once a day (at bedtime)  Vitamin B-12 1000 mcg oral tablet: 1 tab(s) orally once a day

## 2022-08-12 NOTE — PATIENT PROFILE ADULT - FALL HARM RISK - HARM RISK INTERVENTIONS

## 2022-08-12 NOTE — PROGRESS NOTE ADULT - SUBJECTIVE AND OBJECTIVE BOX
The patient refused to continue with a HIDA scan. I explained to the daughter that the HIDA scan would have given us a better picture of whether he has acute cholecystitis or not. The daughter stated that he just wants to eat and wants to go home because he does not have any further pain.  Dr. Nunez from cardiology saw the patient this morning and stated that he is a high-risk patient for laparoscopic cholecystectomy due to his pulmonary hypertension.  I discussed the case with Dr. Srini Wagner who also stated that he is a very high risk for general anesthesia.  I then discussed this with the family and stated that his best option at this point would be to get a cholecystostomy tube.The patient and the daughter both decided that they don't want cholecystostomy tube they would like to go home with oral antibiotics.  I discussed this as an option with them however I told them both that he will most likely develop abdominal pain at home and will need to return to the emergency room.  They understood but would still like to have him eat and if he tolerates the food they would like to go home with oral antibiotics.

## 2022-08-12 NOTE — DISCHARGE NOTE NURSING/CASE MANAGEMENT/SOCIAL WORK - PATIENT PORTAL LINK FT
You can access the FollowMyHealth Patient Portal offered by [facility] by registering at the following website: [email or web address]. By joining [facility]’s FollowMyHealth portal, you will also be able to view your health information using other applications (apps) compatible with our system.

## 2022-08-12 NOTE — DISCHARGE NOTE PROVIDER - HOSPITAL COURSE
89 year old male presents with RUQ pain for the past 12 hours after eating chicken noodle soup. He states that the pain has gotten worse and never went away. He admits to nausea and nonbilious or bloody vomiting. He denies fever or chills. PT without clear cholecystitis, HIDA incomplete. Patient is refusing complete study and surgery.  PT to be discharge with PO antibiotics and close clinic f/u

## 2022-08-12 NOTE — PHARMACOTHERAPY INTERVENTION NOTE - COMMENTS
Medication reconciliation performed with patient's daughter, Jill, over the phone. All current medications, doses, and frequencies confirmed and checked with [facility]. [facility] in [place].     Home Medications:  atorvastatin 40 mg oral tablet: 1 tab(s) orally once a day  *has med but does not take* (12 Aug 2022 12:10)  calcium (as carbonate) 600 mg oral tablet: 1 tab(s) orally once a day (12 Aug 2022 12:10)  clopidogrel 75 mg oral tablet: 1 tab(s) orally once a day (12 Aug 2022 12:10)  metFORMIN 500 mg oral tablet: 1 tab(s) orally 2 times a day (12 Aug 2022 12:10)  metoprolol tartrate 25 mg oral tablet: 1 tab(s) orally 2 times a day (12 Aug 2022 12:10)  Multiple Vitamins oral tablet: 1 tab(s) orally once a day (12 Aug 2022 12:10)  nitrofurantoin macrocrystals-monohydrate 100 mg oral capsule: 1 cap(s) orally 2 times a day (12 Aug 2022 12:10)  tamsulosin 0.4 mg oral capsule: 1 cap(s) orally once a day (at bedtime) (12 Aug 2022 12:10)  Vitamin B-12 1000 mcg oral tablet: 1 tab(s) orally once a day (12 Aug 2022 12:10)

## 2022-08-12 NOTE — H&P ADULT - HISTORY OF PRESENT ILLNESS
89 year old male presents with RUQ pain for the past 12 hours after eating chicken noodle soup. He states that the pain has gotten worse and never went away. He admits to nausea and nonbilious or bloody vomiting. He denies fever or chills.     ROS neg except as above    PAST MEDICAL HISTORY:  BPH (benign prostatic hyperplasia)     CAD S/P percutaneous coronary angioplasty     GERD (gastroesophageal reflux disease)     HLD (hyperlipidemia)     HTN (hypertension)     NSVT (nonsustained ventricular tachycardia)     Palpitations     Prostate cancer s/p radiation.     PAST SURGICAL HISTORY:  S/P cardiac catheterization diagnostic 12/15/14.   No allergies  Sohx: no tobacco, etoh, or illicit drug use    Vitals:  T(C): 37.2 (08-12 @ 02:05), Max: 38.3 (08-11 @ 22:58)  HR: 89 (08-12 @ 02:05) (81 - 89)  BP: 115/75 (08-12 @ 02:05) (115/75 - 127/72)  RR: 18 (08-12 @ 02:05) (18 - 18)  SpO2: 98% (08-12 @ 02:05) (98% - 98%)    Physical Exam:  General: AAOx3, Well developed, NAD  Chest: Normal respiratory effort  Heart: RRR  Abdomen: obese, soft, TTP in RUQ, + Turner's signs  Neuro/Psych: No localized deficits. Normal speech, normal tone  Skin: Normal, no rashes, no lesions noted.   Extremities: Warm, well perfused, no edema, Pulses intact    08-11 @ 23:04                    11.5  CBC: 13.95>)-------(<248                     35.5                 134 | 101 | 13    CMP:  ----------------------< 209               3.9 | 28 | 0.92                      Ca:8.8  Phos:-  Mg:-               0.4|      |25        LFTs:  ------|87|-----             -|      |-      Current Inpatient Medications:  morphine  - Injectable 2 milliGRAM(s) IV Push every 4 hours PRN  ondansetron Injectable 4 milliGRAM(s) IV Push every 6 hours PRN

## 2022-08-12 NOTE — CONSULT NOTE ADULT - SUBJECTIVE AND OBJECTIVE BOX
CHIEF COMPLAINT:  Patient is a 89y old  Male who presents with a chief complaint of abd pain    HPI:  89 year old male h/o CAD s/p PCI 2014, HLD, DM2 presents with RUQ pain after eating chicken noodle soup. He states that the pain has gotten worse and never went away. He admits to nausea and nonbilious or bloody vomiting. He denies fever or chills.     He is well known to my partner, Dr. Jony Luna and has been refusing stress tests and echocardiograms since 2017.    : His main complaint is wanting to eat/drink but still has RUQ pain.    He denies any fevers, chills, CP, palp, SOB, dizziness, syncope, orthopnea, PND    PMHx:  PAST MEDICAL & SURGICAL HISTORY:  BPH (benign prostatic hyperplasia)  HLD (hyperlipidemia)  GERD (gastroesophageal reflux disease)  Prostate cancer s/p radiation  CAD S/P percutaneous coronary angioplasty 2014  DM2    Social History:  Former smoker  Denies any significant etoh or any illicit drug use    FAMILY HISTORY:   FAMILY HISTORY:  Family history of MI (myocardial infarction) (Father)  Family history of uterine cancer (Mother)      ALLERGIES:  Allergies  No Known Allergies    REVIEW OF SYSTEMS:  10 system ROS was obtained, all pertinent positives and negatives are in HPI otherwise they were negative.    Vital Signs Last 24 Hrs  T(C): 37.3 (12 Aug 2022 04:40), Max: 38.3 (11 Aug 2022 22:58)  T(F): 99.1 (12 Aug 2022 04:40), Max: 100.9 (11 Aug 2022 22:58)  HR: 83 (12 Aug 2022 04:40) (81 - 89)  BP: 161/48 (12 Aug 2022 04:40) (115/75 - 166/70)  BP(mean): 77 (12 Aug 2022 04:40) (77 - 94)  RR: 18 (12 Aug 2022 04:40) (18 - 20)  SpO2: 95% (12 Aug 2022 04:40) (95% - 100%)    Parameters below as of 12 Aug 2022 04:40  Patient On (Oxygen Delivery Method): room air    I&O's Summary    11 Aug 2022 07:01  -  12 Aug 2022 07:00  --------------------------------------------------------  IN: 460 mL / OUT: 0 mL / NET: 460 mL    PHYSICAL EXAM:   Constitutional: awake and alert in NAD  HEENT: EOMI, Normal Hearing, MMM  Neck: Soft and supple, No LAD, + mild JVD, no carotid bruit  Respiratory: Breath sounds are clear bilaterally, No wheezing, rales or rhonchi, good air movement  Cardiovascular: S1 and S2, regular rate and rhythm, III/VI systolic murmur at LSB  Gastrointestinal: Bowel Sounds present, soft, nondistended, +RUQ abd pain  Extremities: Warm and well perfused, no peripheral edema  Neurological: A/O x 3, no focal deficits appreciated  Skin: No rashes appreciated    MEDICATIONS:  MEDICATIONS  (STANDING):  pantoprazole  Injectable 40 milliGRAM(s) IV Push daily  piperacillin/tazobactam IVPB.. 3.375 Gram(s) IV Intermittent every 8 hours  sodium chloride 0.9%. 1000 milliLiter(s) (100 mL/Hr) IV Continuous <Continuous>    LABS: All Labs Reviewed:                        10.7   19.35 )-----------( 221      ( 12 Aug 2022 07:49 )             32.8     08-12    134<L>  |  103  |  10  ----------------------------<  174<H>  3.5   |  24  |  0.73    Ca    8.4<L>      12 Aug 2022 07:49  Phos  2.9     08-12  Mg     1.7     08-12    TPro  6.1  /  Alb  2.8<L>  /  TBili  0.3  /  DBili  x   /  AST  40<H>  /  ALT  27  /  AlkPhos  64  08-12    PT/INR - ( 11 Aug 2022 23:04 )   PT: 12.4 sec;   INR: 1.07 ratio    PTT - ( 11 Aug 2022 23:04 )  PTT:26.9 sec    RADIOLOGY:    Reviewed in EMR    EK/12/22, my interpretation: Sinus with first degree AV block at 96bpm, nonspecific ST-T wave changes    TELEMETRY:    Not on tele    ECHO:    Official read pending but my interpretation: Normal LVSF EF 65%, no RWMA, mild-moderate AI, mild MR, eccentric mild-moderate TR with at least moderate-severely elevated pulm pressures. RVSP was difficult to assess some angles with good envelops were in the 30-50s, a few were in the low 60mmHg but the envelop wasn't as good in those - may be from it being eccentric.

## 2022-08-12 NOTE — CONSULT NOTE ADULT - ASSESSMENT
89 yo M with above PMHx who presents with abd pain found to have acute cholecystitis.    -Patient is an elevated cardiac risk due to his age, h/o CAD/MI (although not since 2017) and his elevated pulm pressured.  -Difficult to assess his exact RVSP but at least moderate vs moderate-severe. He does have some JVD but minimal - would expect that if he has severe pulm HTN his JVD would be more significant.  -No sign of recent MI or any decompensated heart failure.  -Unsure if he will be going for perc-lor (low risk procedure) vs lap-lor (intermediate risk) but he is currently optimized for procedure as planned and no further cardiac work up or treatment is needed prior.  -With his acute cholecystitis he is NPO so not on his plavix (his home meds says he is also on asa but our office records states that he is just on plavix). Since no recent MI and PCI was 2017, ok to hold if needed for surgery - restart once safe post surgery

## 2022-08-12 NOTE — DISCHARGE NOTE PROVIDER - CARE PROVIDER_API CALL
Yasir Murray)  Surgery  [address], [address]  [place] [address]  Phone: [phone]  Fax: [phone]  Follow Up Time: 1 week

## 2022-08-13 ENCOUNTER — INPATIENT (INPATIENT)
Facility: HOSPITAL | Age: 87
LOS: 9 days | Discharge: ANOTHER HOSPITAL | DRG: 872 | End: 2022-08-23
Attending: INTERNAL MEDICINE | Admitting: FAMILY MEDICINE
Payer: MEDICARE

## 2022-08-13 VITALS — WEIGHT: 154.1 LBS | HEIGHT: 66 IN

## 2022-08-13 DIAGNOSIS — R78.81 BACTEREMIA: ICD-10-CM

## 2022-08-13 DIAGNOSIS — Z98.89 OTHER SPECIFIED POSTPROCEDURAL STATES: Chronic | ICD-10-CM

## 2022-08-13 LAB
ALBUMIN SERPL ELPH-MCNC: 2.6 G/DL — LOW (ref 3.3–5)
ALP SERPL-CCNC: 67 U/L — SIGNIFICANT CHANGE UP (ref 40–120)
ALT FLD-CCNC: 29 U/L — SIGNIFICANT CHANGE UP (ref 12–78)
ANION GAP SERPL CALC-SCNC: 8 MMOL/L — SIGNIFICANT CHANGE UP (ref 5–17)
APPEARANCE UR: CLEAR — SIGNIFICANT CHANGE UP
AST SERPL-CCNC: 43 U/L — HIGH (ref 15–37)
BASOPHILS # BLD AUTO: 0.02 K/UL — SIGNIFICANT CHANGE UP (ref 0–0.2)
BASOPHILS NFR BLD AUTO: 0.1 % — SIGNIFICANT CHANGE UP (ref 0–2)
BILIRUB SERPL-MCNC: 0.5 MG/DL — SIGNIFICANT CHANGE UP (ref 0.2–1.2)
BILIRUB UR-MCNC: NEGATIVE — SIGNIFICANT CHANGE UP
BUN SERPL-MCNC: 10 MG/DL — SIGNIFICANT CHANGE UP (ref 7–23)
CALCIUM SERPL-MCNC: 8.5 MG/DL — SIGNIFICANT CHANGE UP (ref 8.5–10.1)
CHLORIDE SERPL-SCNC: 104 MMOL/L — SIGNIFICANT CHANGE UP (ref 96–108)
CO2 SERPL-SCNC: 22 MMOL/L — SIGNIFICANT CHANGE UP (ref 22–31)
COLOR SPEC: YELLOW — SIGNIFICANT CHANGE UP
CREAT SERPL-MCNC: 0.7 MG/DL — SIGNIFICANT CHANGE UP (ref 0.5–1.3)
CULTURE RESULTS: NO GROWTH — SIGNIFICANT CHANGE UP
DIFF PNL FLD: ABNORMAL
EGFR: 88 ML/MIN/1.73M2 — SIGNIFICANT CHANGE UP
EOSINOPHIL # BLD AUTO: 0.01 K/UL — SIGNIFICANT CHANGE UP (ref 0–0.5)
EOSINOPHIL NFR BLD AUTO: 0.1 % — SIGNIFICANT CHANGE UP (ref 0–6)
GLUCOSE SERPL-MCNC: 128 MG/DL — HIGH (ref 70–99)
GLUCOSE UR QL: NEGATIVE — SIGNIFICANT CHANGE UP
HCT VFR BLD CALC: 33.6 % — LOW (ref 39–50)
HGB BLD-MCNC: 11.2 G/DL — LOW (ref 13–17)
IMM GRANULOCYTES NFR BLD AUTO: 0.7 % — SIGNIFICANT CHANGE UP (ref 0–1.5)
KETONES UR-MCNC: ABNORMAL
LACTATE SERPL-SCNC: 1.5 MMOL/L — SIGNIFICANT CHANGE UP (ref 0.7–2)
LEUKOCYTE ESTERASE UR-ACNC: NEGATIVE — SIGNIFICANT CHANGE UP
LYMPHOCYTES # BLD AUTO: 1.02 K/UL — SIGNIFICANT CHANGE UP (ref 1–3.3)
LYMPHOCYTES # BLD AUTO: 5.8 % — LOW (ref 13–44)
MCHC RBC-ENTMCNC: 28.7 PG — SIGNIFICANT CHANGE UP (ref 27–34)
MCHC RBC-ENTMCNC: 33.3 GM/DL — SIGNIFICANT CHANGE UP (ref 32–36)
MCV RBC AUTO: 86.2 FL — SIGNIFICANT CHANGE UP (ref 80–100)
MONOCYTES # BLD AUTO: 1.26 K/UL — HIGH (ref 0–0.9)
MONOCYTES NFR BLD AUTO: 7.2 % — SIGNIFICANT CHANGE UP (ref 2–14)
NEUTROPHILS # BLD AUTO: 15.01 K/UL — HIGH (ref 1.8–7.4)
NEUTROPHILS NFR BLD AUTO: 86.1 % — HIGH (ref 43–77)
NITRITE UR-MCNC: NEGATIVE — SIGNIFICANT CHANGE UP
PH UR: 6 — SIGNIFICANT CHANGE UP (ref 5–8)
PLATELET # BLD AUTO: 206 K/UL — SIGNIFICANT CHANGE UP (ref 150–400)
POTASSIUM SERPL-MCNC: 3.4 MMOL/L — LOW (ref 3.5–5.3)
POTASSIUM SERPL-SCNC: 3.4 MMOL/L — LOW (ref 3.5–5.3)
PROT SERPL-MCNC: 6.3 GM/DL — SIGNIFICANT CHANGE UP (ref 6–8.3)
PROT UR-MCNC: 100
RBC # BLD: 3.9 M/UL — LOW (ref 4.2–5.8)
RBC # FLD: 13.2 % — SIGNIFICANT CHANGE UP (ref 10.3–14.5)
SARS-COV-2 RNA SPEC QL NAA+PROBE: SIGNIFICANT CHANGE UP
SODIUM SERPL-SCNC: 134 MMOL/L — LOW (ref 135–145)
SP GR SPEC: 1.02 — SIGNIFICANT CHANGE UP (ref 1.01–1.02)
SPECIMEN SOURCE: SIGNIFICANT CHANGE UP
TROPONIN I, HIGH SENSITIVITY RESULT: 163.98 NG/L — HIGH
UROBILINOGEN FLD QL: NEGATIVE — SIGNIFICANT CHANGE UP
WBC # BLD: 17.45 K/UL — HIGH (ref 3.8–10.5)
WBC # FLD AUTO: 17.45 K/UL — HIGH (ref 3.8–10.5)

## 2022-08-13 PROCEDURE — 93005 ELECTROCARDIOGRAM TRACING: CPT

## 2022-08-13 PROCEDURE — 99223 1ST HOSP IP/OBS HIGH 75: CPT

## 2022-08-13 PROCEDURE — 93010 ELECTROCARDIOGRAM REPORT: CPT

## 2022-08-13 PROCEDURE — 78226 HEPATOBILIARY SYSTEM IMAGING: CPT

## 2022-08-13 PROCEDURE — 36415 COLL VENOUS BLD VENIPUNCTURE: CPT

## 2022-08-13 PROCEDURE — 85027 COMPLETE CBC AUTOMATED: CPT

## 2022-08-13 PROCEDURE — 87075 CULTR BACTERIA EXCEPT BLOOD: CPT

## 2022-08-13 PROCEDURE — U0003: CPT

## 2022-08-13 PROCEDURE — 71045 X-RAY EXAM CHEST 1 VIEW: CPT | Mod: 26

## 2022-08-13 PROCEDURE — 80076 HEPATIC FUNCTION PANEL: CPT

## 2022-08-13 PROCEDURE — C1729: CPT

## 2022-08-13 PROCEDURE — 80048 BASIC METABOLIC PNL TOTAL CA: CPT

## 2022-08-13 PROCEDURE — 82962 GLUCOSE BLOOD TEST: CPT

## 2022-08-13 PROCEDURE — 85025 COMPLETE CBC W/AUTO DIFF WBC: CPT

## 2022-08-13 PROCEDURE — 71045 X-RAY EXAM CHEST 1 VIEW: CPT

## 2022-08-13 PROCEDURE — 83036 HEMOGLOBIN GLYCOSYLATED A1C: CPT

## 2022-08-13 PROCEDURE — 99285 EMERGENCY DEPT VISIT HI MDM: CPT

## 2022-08-13 PROCEDURE — 82248 BILIRUBIN DIRECT: CPT

## 2022-08-13 PROCEDURE — 85610 PROTHROMBIN TIME: CPT

## 2022-08-13 PROCEDURE — 47490 INCISION OF GALLBLADDER: CPT

## 2022-08-13 PROCEDURE — 83735 ASSAY OF MAGNESIUM: CPT

## 2022-08-13 PROCEDURE — A9537: CPT

## 2022-08-13 PROCEDURE — U0005: CPT

## 2022-08-13 PROCEDURE — 80053 COMPREHEN METABOLIC PANEL: CPT

## 2022-08-13 PROCEDURE — 84484 ASSAY OF TROPONIN QUANT: CPT

## 2022-08-13 PROCEDURE — 87040 BLOOD CULTURE FOR BACTERIA: CPT

## 2022-08-13 PROCEDURE — C1894: CPT

## 2022-08-13 PROCEDURE — 85730 THROMBOPLASTIN TIME PARTIAL: CPT

## 2022-08-13 PROCEDURE — 84100 ASSAY OF PHOSPHORUS: CPT

## 2022-08-13 PROCEDURE — 87635 SARS-COV-2 COVID-19 AMP PRB: CPT

## 2022-08-13 PROCEDURE — 87070 CULTURE OTHR SPECIMN AEROBIC: CPT

## 2022-08-13 RX ORDER — POTASSIUM CHLORIDE 20 MEQ
40 PACKET (EA) ORAL ONCE
Refills: 0 | Status: COMPLETED | OUTPATIENT
Start: 2022-08-13 | End: 2022-08-13

## 2022-08-13 RX ORDER — CARVEDILOL PHOSPHATE 80 MG/1
6.25 CAPSULE, EXTENDED RELEASE ORAL EVERY 12 HOURS
Refills: 0 | Status: DISCONTINUED | OUTPATIENT
Start: 2022-08-13 | End: 2022-08-14

## 2022-08-13 RX ORDER — HEPARIN SODIUM 5000 [USP'U]/ML
5000 INJECTION INTRAVENOUS; SUBCUTANEOUS EVERY 12 HOURS
Refills: 0 | Status: DISCONTINUED | OUTPATIENT
Start: 2022-08-13 | End: 2022-08-16

## 2022-08-13 RX ORDER — SODIUM CHLORIDE 9 MG/ML
1000 INJECTION, SOLUTION INTRAVENOUS
Refills: 0 | Status: DISCONTINUED | OUTPATIENT
Start: 2022-08-13 | End: 2022-08-23

## 2022-08-13 RX ORDER — DEXTROSE 50 % IN WATER 50 %
12.5 SYRINGE (ML) INTRAVENOUS ONCE
Refills: 0 | Status: DISCONTINUED | OUTPATIENT
Start: 2022-08-13 | End: 2022-08-23

## 2022-08-13 RX ORDER — DEXTROSE 50 % IN WATER 50 %
25 SYRINGE (ML) INTRAVENOUS ONCE
Refills: 0 | Status: DISCONTINUED | OUTPATIENT
Start: 2022-08-13 | End: 2022-08-23

## 2022-08-13 RX ORDER — GLUCAGON INJECTION, SOLUTION 0.5 MG/.1ML
1 INJECTION, SOLUTION SUBCUTANEOUS ONCE
Refills: 0 | Status: DISCONTINUED | OUTPATIENT
Start: 2022-08-13 | End: 2022-08-23

## 2022-08-13 RX ORDER — PREGABALIN 225 MG/1
1000 CAPSULE ORAL DAILY
Refills: 0 | Status: DISCONTINUED | OUTPATIENT
Start: 2022-08-13 | End: 2022-08-23

## 2022-08-13 RX ORDER — ATORVASTATIN CALCIUM 80 MG/1
1 TABLET, FILM COATED ORAL
Qty: 0 | Refills: 0 | DISCHARGE

## 2022-08-13 RX ORDER — AMPICILLIN SODIUM AND SULBACTAM SODIUM 250; 125 MG/ML; MG/ML
3 INJECTION, POWDER, FOR SUSPENSION INTRAMUSCULAR; INTRAVENOUS ONCE
Refills: 0 | Status: COMPLETED | OUTPATIENT
Start: 2022-08-13 | End: 2022-08-13

## 2022-08-13 RX ORDER — SODIUM CHLORIDE 9 MG/ML
500 INJECTION INTRAMUSCULAR; INTRAVENOUS; SUBCUTANEOUS ONCE
Refills: 0 | Status: COMPLETED | OUTPATIENT
Start: 2022-08-13 | End: 2022-08-13

## 2022-08-13 RX ORDER — DEXTROSE 50 % IN WATER 50 %
15 SYRINGE (ML) INTRAVENOUS ONCE
Refills: 0 | Status: DISCONTINUED | OUTPATIENT
Start: 2022-08-13 | End: 2022-08-23

## 2022-08-13 RX ORDER — ACETAMINOPHEN 500 MG
650 TABLET ORAL EVERY 6 HOURS
Refills: 0 | Status: DISCONTINUED | OUTPATIENT
Start: 2022-08-13 | End: 2022-08-23

## 2022-08-13 RX ORDER — LANOLIN ALCOHOL/MO/W.PET/CERES
3 CREAM (GRAM) TOPICAL AT BEDTIME
Refills: 0 | Status: DISCONTINUED | OUTPATIENT
Start: 2022-08-13 | End: 2022-08-23

## 2022-08-13 RX ORDER — SODIUM CHLORIDE 9 MG/ML
500 INJECTION INTRAMUSCULAR; INTRAVENOUS; SUBCUTANEOUS
Refills: 0 | Status: DISCONTINUED | OUTPATIENT
Start: 2022-08-13 | End: 2022-08-15

## 2022-08-13 RX ORDER — ONDANSETRON 8 MG/1
4 TABLET, FILM COATED ORAL ONCE
Refills: 0 | Status: DISCONTINUED | OUTPATIENT
Start: 2022-08-13 | End: 2022-08-23

## 2022-08-13 RX ORDER — METOPROLOL TARTRATE 50 MG
1 TABLET ORAL
Qty: 0 | Refills: 0 | DISCHARGE

## 2022-08-13 RX ORDER — INSULIN LISPRO 100/ML
VIAL (ML) SUBCUTANEOUS
Refills: 0 | Status: DISCONTINUED | OUTPATIENT
Start: 2022-08-13 | End: 2022-08-23

## 2022-08-13 RX ORDER — AMPICILLIN SODIUM AND SULBACTAM SODIUM 250; 125 MG/ML; MG/ML
1.5 INJECTION, POWDER, FOR SUSPENSION INTRAMUSCULAR; INTRAVENOUS EVERY 6 HOURS
Refills: 0 | Status: DISCONTINUED | OUTPATIENT
Start: 2022-08-13 | End: 2022-08-14

## 2022-08-13 RX ORDER — ASPIRIN/CALCIUM CARB/MAGNESIUM 324 MG
81 TABLET ORAL ONCE
Refills: 0 | Status: COMPLETED | OUTPATIENT
Start: 2022-08-13 | End: 2022-08-13

## 2022-08-13 RX ADMIN — Medication 40 MILLIEQUIVALENT(S): at 23:25

## 2022-08-13 RX ADMIN — AMPICILLIN SODIUM AND SULBACTAM SODIUM 200 GRAM(S): 250; 125 INJECTION, POWDER, FOR SUSPENSION INTRAMUSCULAR; INTRAVENOUS at 16:31

## 2022-08-13 RX ADMIN — SODIUM CHLORIDE 500 MILLILITER(S): 9 INJECTION INTRAMUSCULAR; INTRAVENOUS; SUBCUTANEOUS at 16:30

## 2022-08-13 RX ADMIN — HEPARIN SODIUM 5000 UNIT(S): 5000 INJECTION INTRAVENOUS; SUBCUTANEOUS at 23:08

## 2022-08-13 RX ADMIN — SODIUM CHLORIDE 75 MILLILITER(S): 9 INJECTION INTRAMUSCULAR; INTRAVENOUS; SUBCUTANEOUS at 18:43

## 2022-08-13 RX ADMIN — CARVEDILOL PHOSPHATE 6.25 MILLIGRAM(S): 80 CAPSULE, EXTENDED RELEASE ORAL at 20:02

## 2022-08-13 RX ADMIN — Medication 2: at 19:24

## 2022-08-13 RX ADMIN — AMPICILLIN SODIUM AND SULBACTAM SODIUM 100 GRAM(S): 250; 125 INJECTION, POWDER, FOR SUSPENSION INTRAMUSCULAR; INTRAVENOUS at 23:27

## 2022-08-13 RX ADMIN — Medication 81 MILLIGRAM(S): at 23:07

## 2022-08-13 NOTE — H&P ADULT - ASSESSMENT
The patient is a 89-year-old male with history of CAD, DM2, HTN, HLD, BPH,  s/p rad.Tx for porstate CA was hospitalized on 8/11/22 and diagnosed with acute cholecystitis.  The patient was a high risk for laparoscopic cholecystectomy because of his cardiac condition and cholecystostomy tube placement was offered.  The patient declined the procedure and was discharged home yesterday on Augmentin.  The patient was recalled to the emergency room because his blood culture grew gram variable rods.  The patient was started on IV Unasyn in the ER.  In the emergency room the patient developed sinus tachycardia with the elevated heart rate to 140.  Patient denies chest pain or abdominal pain nausea vomiting.  assessment Dx:                       1. Bacteremia/Gram variable rods in the blood cultures                       2. Acute cholecystitis                       3. SVT supraventricular tachycardia                       4.CAD                       5. DM 2                       6 moderate pulmonary hypertension    Plan:    admit to Telemetry               medications: Restart home medications by mouth, started on IV Unasyn.               VTEP: Heparin               Labs: Blood cultures, CBC, BMP, troponin               Radiology:As above               Cardiac diagnostics: EKG               Consults: Cardiology, ID, surgery                Advance Directive: full code,                                  The patient is a 89-year-old male with history of CAD, DM2, HTN, HLD, BPH,  s/p rad.Tx for porstate CA was hospitalized on 8/11/22 and diagnosed with acute cholecystitis.  The patient was a high risk for laparoscopic cholecystectomy because of his cardiac condition and cholecystostomy tube placement was offered.  The patient declined the procedure and was discharged home yesterday on Augmentin.  The patient was recalled to the emergency room because his blood culture grew gram variable rods.  The patient was started on IV Unasyn in the ER.  In the emergency room the patient developed sinus tachycardia with the elevated heart rate to 140.  Patient denies chest pain or abdominal pain nausea vomiting.  assessment Dx:                       1. Bacteremia/Gram variable rods in the blood cultures                       2. Acute cholecystitis                       3. SVT supraventricular tachycardia                       4.CAD                       5. DM 2                       6 moderate pulmonary hypertension                       7.borderline hypokalemia    Plan:    admit to Telemetry               medications: Restart home medications by mouth, started on IV Unasyn.Supplement K orally, IV fluids, nothing by mouth after midnight               VTEP: Heparin               Labs: Blood cultures, CBC, BMP, troponin               Radiology:As above               Cardiac diagnostics: EKG               Consults: Cardiology, ID, surgery                Advance Directive: full code,

## 2022-08-13 NOTE — PATIENT PROFILE ADULT - FALL HARM RISK - HARM RISK INTERVENTIONS

## 2022-08-13 NOTE — ED PROVIDER NOTE - CARDIAC, MLM
Normal rate, regular rhythm.  Heart sounds S1, S2.  No murmurs, rubs or gallops normal radial pulse Normal rate, regular rhythm.  Heart sounds S1, S2.  No murmurs, rubs or gallops, normal radial pulse

## 2022-08-13 NOTE — ED PROVIDER NOTE - NEUROLOGICAL, MLM
Alert and oriented, no focal deficits, no motor or sensory deficits. cranial nerves intact , no focal neuro deficit

## 2022-08-13 NOTE — H&P ADULT - HISTORY OF PRESENT ILLNESS
HPI: The patient is a 89-year-old male with history of CAD, DM2, HTN, HLD, BPH,  s/p rad.Tx for porstate CA was hospitalized on 8/11/22 and diagnosed with acute cholecystitis.  The patient was a high risk for laparoscopic cholecystectomy because of his cardiac condition and cholecystostomy tube placement was offered.  The patient declined the procedure and was discharged home yesterday on Augmentin.  The patient was recalled to the emergency room because his blood culture grew gram variable rods.  The patient was started on IV Unasyn in the ER.  In the emergency room the patient developed sinus tachycardia with the elevated heart rate to 140.  Patient denies chest pain or abdominal pain nausea vomiting.    PMHx: CAD,DM2, HTN, HLD, BPH,  s/p rad.Tx for porstate     PSHx: PCI  stents x 3 at  by Dr. Sharma  2010 approx. for positive stress test. Had  radiation  at [facility] in [place] for  Prostate CA in 2007.    Family Hx: Father was alcoholic,  Mother :  colon CA,,  lives with his wife    Social Hx.: not smoking, no alcohol use

## 2022-08-13 NOTE — ED PROVIDER NOTE - NSICDXFAMILYHX_GEN_ALL_CORE_FT
[Mother] : mother Detail Level: Detailed FAMILY HISTORY:  Father  Still living? No  Family history of MI (myocardial infarction), Age at diagnosis: Age Unknown    Mother  Still living? No  Family history of uterine cancer, Age at diagnosis: Age Unknown

## 2022-08-13 NOTE — ED PROVIDER NOTE - SKIN, MLM
Skin normal color for race, warm, dry and intact. No evidence of rash. praveen x4 no focal tenderness no rash Skin normal color for race, warm, dry and intact. No evidence of rash.  No tactile warmth

## 2022-08-13 NOTE — ED PROVIDER NOTE - CLINICAL SUMMARY MEDICAL DECISION MAKING FREE TEXT BOX
89 y/o M multiple PMHx admitted 2 days ago suspect acute cholecystitis under surgery service Dr. Murray inpatient inconclusive and incomplete. pt refused study completion. pt refused offer of refused cholecystostomy tube and DC yesterday. on PO Augmentin family contacted today 1 blood culture tube + growth pt referred to ED for further evaluation asymptomatic no fever nor chills exam unremarkable  Plan: EKG chest lab with culture, urine with culture, lactate discuss with Dr. Murray, observe and reassess 89 y/o M multiple PMHx admitted 2 days ago suspect acute cholecystitis under surgery service Dr. Murray inpatient HIDA scan inconclusive and incomplete: pt refused study completion. pt refused offer of  cholecystostomy tube and DC yesterday on PO Augmentin.  Family contacted today: 1 blood culture tube + growth.  Pt referred to ED for further evaluation.  Pt + asymptomatic no fever nor chills, exam unremarkable.  Plan: EKG, CXR, labs with BCs, urine with culture, lactate, discuss with Dr. Murray, observe and reassess.  Expect admission.

## 2022-08-13 NOTE — ED ADULT NURSE REASSESSMENT NOTE - NS ED NURSE REASSESS COMMENT FT1
Report received from CASTILLO Bergeron. Patient resting in stretcher with wife at bedside. Patient has no complaints of pain or discomfort at this time. Patient having blood glucose checked. Call out to Dr. Morrison in regards to his elevated heart rate. Patient has no complaints of palpitations, CP or SOB. Safety maintained.

## 2022-08-13 NOTE — ED ADULT NURSE REASSESSMENT NOTE - NS ED NURSE REASSESS COMMENT FT1
As per Dr. Morgan, patient should receive his dose of Carvedilol at 8pm instead of at 10. Patient will also be upgraded to telemetry instead of med/surg.

## 2022-08-13 NOTE — ED ADULT NURSE NOTE - NSIMPLEMENTINTERV_GEN_ALL_ED
Implemented All Fall with Harm Risk Interventions:  [place] to call system. Call bell, personal items and telephone within reach. Instruct patient to call for assistance. Room bathroom lighting operational. Non-slip footwear when patient is off stretcher. Physically safe environment: no spills, clutter or unnecessary equipment. Stretcher in lowest position, wheels locked, appropriate side rails in place. Provide visual cue, wrist band, yellow gown, etc. Monitor gait and stability. Monitor for mental status changes and reorient to person, place, and time. Review medications for side effects contributing to fall risk. Reinforce activity limits and safety measures with patient and family. Provide visual clues: red socks.

## 2022-08-13 NOTE — ED PROVIDER NOTE - GENITOURINARY, MLM
No discharge, lesions, deferred no flank no tenderness No discharge, lesions, deferred no flank nor CVAT

## 2022-08-13 NOTE — ED PROVIDER NOTE - RESPIRATORY, MLM
Breath sounds clear and equal bilaterally clear respirations, normal Breath sounds clear and equal bilaterally clear, respirations, normal

## 2022-08-13 NOTE — ED PROVIDER NOTE - MUSCULOSKELETAL, MLM
Spine appears normal, range of motion is not limited, no muscle or joint tenderness Spine appears normal, range of motion is not limited, no muscle or joint tenderness.  CARTY x 4, no focal extremity swelling/tender

## 2022-08-13 NOTE — ED PROVIDER NOTE - PROGRESS NOTE DETAILS
JESS Avendano MD:  Case d/w Dr. Murray: pt is NOT a surgical candidate & not to be re-admitted under Surgical svce.  He advises cholecystostomy tube by IR to be done & for surgery resident to initiate Surgery consult.  resident aware of consult.  DR. Morgan called for admission, coming over to ED to receive formal admission signout. josé luis Avendano MD:  Dr. Morgan aware of admission, + SASCHA. JESS Avendano MD:  Case d/w Dr. Murray: pt is NOT a surgical candidate & not to be re-admitted under Surgical svce.  He advises cholecystostomy tube by IR to be done & for surgery resident to initiate Surgery consult.  Resident aware of consult.  Dr. Morgan called for admission, coming over to ED to receive formal admission signout.

## 2022-08-13 NOTE — H&P ADULT - NSHPREVIEWOFSYSTEMS_GEN_ALL_CORE
ROS:   Eyes: no changes in vision  ENT/Mouth: no changes    Cardiovascular: no chest pain    Respiratory: no SOB    Gastrointestinal: no diarrhea, no nausea, no vomiting    Genitourinary: no dysuria    Breast: no pain    Musculoskeletal: no pain    Integumentary: no itching    Neurological: No Headache, no tremor,    Endocrine: no excessive thirst,     Allergic/Immunologic: no itching

## 2022-08-13 NOTE — H&P ADULT - NSHPPHYSICALEXAM_GEN_ALL_CORE
Physical Exam: Vital Signs Last 24 Hrs  T(C): 36.7 (13 Aug 2022 18:00), Max: 37.8 (13 Aug 2022 14:12)  T(F): 98.1 (13 Aug 2022 18:00), Max: 100 (13 Aug 2022 14:12)  HR: 100 (13 Aug 2022 18:00) (70 - 100)  BP: 110/73 (13 Aug 2022 18:00) (110/73 - 172/66)  BP(mean): 95 (13 Aug 2022 14:12) (95 - 95)  RR: 22 (13 Aug 2022 16:00) (20 - 23)  SpO2: 95% (13 Aug 2022 16:00) (93% - 95%)    Parameters below as of 13 Aug 2022 16:00  Patient On (Oxygen Delivery Method): room air            HEENT: PRRL EOMI    MOUTH/TEETH/GUMS: Clear    NECK: no JVD    LUNGS: Clear    HEART: S1,S2 RR    ABDOMEN: soft nontender    EXTREMITIES:  no pedal edema    MUSCULOSKELETAL: no joint swelling     NEURO: no tremor, no focal signs.    SKIN: no rash    : CVA negative,

## 2022-08-13 NOTE — PROVIDER CONTACT NOTE (OTHER) - SITUATION
Patient arrived on 3E from ER. Patient  on cardiac monitor, patient asymptomatic. VSS. MD Hernandez and MD Morgan notified. Patient received PO Coreg in ED at 2008. No new orders. Will monitor. Patient arrived on 3E from ER. Patient  on cardiac monitor, patient asymptomatic. VSS. MD Hernandez and MD Morgan notified. Patient received PO Coreg in ED at 2008. No new orders.

## 2022-08-13 NOTE — H&P ADULT - NSHPLABSRESULTS_GEN_ALL_CORE
11.2   17.45 )-----------( 206      ( 13 Aug 2022 15:20 )             33.6       08-13    134<L>  |  104  |  10  ----------------------------<  128<H>  3.4<L>   |  22  |  0.70    Ca    8.5      13 Aug 2022 15:20  Phos  2.9     08-12  Mg     1.7     08-12    TPro  6.3  /  Alb  2.6<L>  /  TBili  0.5  /  DBili  x   /  AST  43<H>  /  ALT  29  /  AlkPhos  67  08-13    CT abd: Dilated gallbladder, cholelithiasis and trace pericholecystic stranding,   highly suspicious for acute cholecystitis. 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 Superimposed   cystitis should be evaluated for clinically, with correlation with   urinalysis.  HiDA scan:Limited/Incomplete study.  BC from 10/11/22: Growth in anaerobic bottle: Gram Variable Rods  Echocardiogram : EF 60-65%,Moderate (2+) eccentric tricuspid valve regurgitation is present.   Moderate to severe pulmonary hypertension.

## 2022-08-13 NOTE — ED PROVIDER NOTE - OBJECTIVE STATEMENT
89 y/o M with PMHx of CAD, GERD, Prostate cancer, HLD, HTN, VPH, s/p stent recently on Macrobid for UTI, admitted AUG 11-12 regarding RUQ pain suspected of an acute cholecystitis with elevated white count abnormal CT. pt admitted to surgical service by Dr. Murray. scan done as inpatient. pt refused study to be completed. pt deemed poor candidate for lap lor due to pulmonary HTN. pt's family refused cholecystostomy. pt discharged yesterday afternoon on antibiotic. opt received call from surgery office of blood culture for abnormal results; in anaerobic referred to ED for further evaluation 89 y/o M with PMHx of CAD, GERD, Prostate cancer, HLD, HTN, VPH, s/p stent recently on Macrobid for UTI, admitted AUG 11-12 regarding RUQ pain suspected of an acute cholecystitis with elevated white count abnormal CT. pt admitted to surgical service by Dr. Murray. scan done as inpatient. pt refused study to be completed. pt deemed poor candidate for lap lor due to pulmonary HTN. pt's family refused cholecystostomy. pt discharged yesterday afternoon on antibiotic. opt received call from surgery office of blood culture for abnormal results; in anaerobic referred to ED for further evaluation. pt denies fever and belly pain. discharged yesterday on oral Augmentin 875 TID. pt states a little [id number] when pee. 89 y/o M with PMHx of CAD, GERD, Prostate cancer, HLD, HTN, VPH, s/p stent recently on Macrobid for UTI, admitted AUG 11-12 regarding RUQ pain suspected of an acute cholecystitis with elevated white count abnormal CT. pt admitted to surgical service by Dr. Murray. scan done as inpatient. pt refused study to be completed. pt deemed poor candidate for lap lor due to pulmonary HTN. pt's family refused cholecystostomy tube. pt discharged yesterday afternoon on antibiotic. opt received call from surgery office of blood culture for abnormal results; in anaerobic referred to ED for further evaluation. pt denies fever and belly pain. discharged yesterday on oral Augmentin 875 TID. pt states a little [id number] when pee. 89 y/o M with PMHx of CAD s/p stent, GERD, Prostate cancer, HLD, HTN, BPH,  recently on Macrobid for UTI, admitted AUG 11-12 regarding RUQ pain suspected of an acute cholecystitis with elevated white count/abnormal CT: pt admitted to surgical service by Dr. Murray. HIDA scan done as inpatient: results inconclusive/ study incomplete d/t pt refused study to be completed.  Pt deemed poor candidate for lap lor due to pulmonary HTN. pt's family refused cholecystostomy tube. Pt discharged yesterday afternoon on po antibiotic Augmentin 875 tid. Pt received call from surgery office re: blood culture + for abnormal results & pt referred to ED for further evaluation. pt denies fever and belly pain. pt states a little itching when urinates.

## 2022-08-13 NOTE — ED PROVIDER NOTE - CARE PLAN
1 Principal Discharge DX:	Bacteremia   Principal Discharge DX:	Bacteremia  Secondary Diagnosis:	Cholecystitis

## 2022-08-13 NOTE — ED ADULT NURSE NOTE - CHIEF COMPLAINT QUOTE
Patient sent in by Dr. Murray for antibiotics after abnormal blood culture results. Patient sent in by Dr. Murray for antibiotics after abnormal blood culture results pt had complaints of abdominal pain sent for HIDA scan pt refused study to be completed. pt poor candidate for lap lor due to pulmonary HTN and family refused cholecystostomy tube. pt was then discharged yesterday afternoon on PO antibiotic. pt then received call from surgery office of + blood cultures daughter at bedside who is an RN

## 2022-08-13 NOTE — CONSULT NOTE ADULT - ASSESSMENT
89 y/o M with PMHx of CAD, GERD, Prostate cancer, HLD, HTN, VPH, s/p stent recently on Macrobid for UTI, admitted AUG 11-12 regarding RUQ pain suspected of an acute cholecystitis with elevated white count and abnormal CT. Pt was admitted to surgical service by Dr. Murray. HIDA scan was imcomplete since pt refuse to continue study. Pt is a poor candidate for lap lor due to pulmonary HTN. Pt refused cholecystostomy tube and was discharged yesterday afternoon on antibiotic. He received call from surgery office of blood culture for abnormal results; in anaerobic referred to ED for further evaluation. Pt denies nausea, vomiting, abdominal pain.  Patient was again explained the need of cholecystostomy tube if acute cholecystitis is present, however, the patient is still not amenable for any procedure and wants observation and medical management.    Plan:    No surgical intervention at this moment  Care by medicine team  If patient has signs of acute cholecystitis IR cholecystostomy tube is recommended.  Rest of care by primary team  Please reconsult as necessary    Case discussed with Dr. Murray

## 2022-08-13 NOTE — ED PROVIDER NOTE - GASTROINTESTINAL, MLM
Abdomen soft, non-tender, no guarding +bowel sounds, Abdomen soft, non-tender, no guarding, +bowel sounds,

## 2022-08-14 LAB
A1C WITH ESTIMATED AVERAGE GLUCOSE RESULT: 6.6 % — HIGH (ref 4–5.6)
ADD ON TEST-SPECIMEN IN LAB: SIGNIFICANT CHANGE UP
ANION GAP SERPL CALC-SCNC: 5 MMOL/L — SIGNIFICANT CHANGE UP (ref 5–17)
BUN SERPL-MCNC: 12 MG/DL — SIGNIFICANT CHANGE UP (ref 7–23)
CALCIUM SERPL-MCNC: 7.9 MG/DL — LOW (ref 8.5–10.1)
CHLORIDE SERPL-SCNC: 106 MMOL/L — SIGNIFICANT CHANGE UP (ref 96–108)
CO2 SERPL-SCNC: 21 MMOL/L — LOW (ref 22–31)
CREAT SERPL-MCNC: 0.54 MG/DL — SIGNIFICANT CHANGE UP (ref 0.5–1.3)
CULTURE RESULTS: NO GROWTH — SIGNIFICANT CHANGE UP
EGFR: 95 ML/MIN/1.73M2 — SIGNIFICANT CHANGE UP
ESTIMATED AVERAGE GLUCOSE: 143 MG/DL — HIGH (ref 68–114)
GLUCOSE SERPL-MCNC: 97 MG/DL — SIGNIFICANT CHANGE UP (ref 70–99)
HCT VFR BLD CALC: 29.2 % — LOW (ref 39–50)
HGB BLD-MCNC: 9.8 G/DL — LOW (ref 13–17)
MCHC RBC-ENTMCNC: 29 PG — SIGNIFICANT CHANGE UP (ref 27–34)
MCHC RBC-ENTMCNC: 33.6 GM/DL — SIGNIFICANT CHANGE UP (ref 32–36)
MCV RBC AUTO: 86.4 FL — SIGNIFICANT CHANGE UP (ref 80–100)
PLATELET # BLD AUTO: 188 K/UL — SIGNIFICANT CHANGE UP (ref 150–400)
POTASSIUM SERPL-MCNC: 4 MMOL/L — SIGNIFICANT CHANGE UP (ref 3.5–5.3)
POTASSIUM SERPL-SCNC: 4 MMOL/L — SIGNIFICANT CHANGE UP (ref 3.5–5.3)
RBC # BLD: 3.38 M/UL — LOW (ref 4.2–5.8)
RBC # FLD: 13.2 % — SIGNIFICANT CHANGE UP (ref 10.3–14.5)
SODIUM SERPL-SCNC: 132 MMOL/L — LOW (ref 135–145)
SPECIMEN SOURCE: SIGNIFICANT CHANGE UP
TROPONIN I, HIGH SENSITIVITY RESULT: 128.04 NG/L — HIGH
WBC # BLD: 14.39 K/UL — HIGH (ref 3.8–10.5)
WBC # FLD AUTO: 14.39 K/UL — HIGH (ref 3.8–10.5)

## 2022-08-14 PROCEDURE — 99233 SBSQ HOSP IP/OBS HIGH 50: CPT

## 2022-08-14 RX ORDER — PIPERACILLIN AND TAZOBACTAM 4; .5 G/20ML; G/20ML
3.38 INJECTION, POWDER, LYOPHILIZED, FOR SOLUTION INTRAVENOUS EVERY 8 HOURS
Refills: 0 | Status: COMPLETED | OUTPATIENT
Start: 2022-08-14 | End: 2022-08-21

## 2022-08-14 RX ORDER — INSULIN LISPRO 100/ML
VIAL (ML) SUBCUTANEOUS AT BEDTIME
Refills: 0 | Status: DISCONTINUED | OUTPATIENT
Start: 2022-08-14 | End: 2022-08-23

## 2022-08-14 RX ORDER — METOPROLOL TARTRATE 50 MG
25 TABLET ORAL
Refills: 0 | Status: DISCONTINUED | OUTPATIENT
Start: 2022-08-14 | End: 2022-08-14

## 2022-08-14 RX ORDER — METOPROLOL TARTRATE 50 MG
25 TABLET ORAL
Refills: 0 | Status: DISCONTINUED | OUTPATIENT
Start: 2022-08-14 | End: 2022-08-15

## 2022-08-14 RX ADMIN — Medication 25 MILLIGRAM(S): at 12:27

## 2022-08-14 RX ADMIN — HEPARIN SODIUM 5000 UNIT(S): 5000 INJECTION INTRAVENOUS; SUBCUTANEOUS at 14:34

## 2022-08-14 RX ADMIN — AMPICILLIN SODIUM AND SULBACTAM SODIUM 100 GRAM(S): 250; 125 INJECTION, POWDER, FOR SUSPENSION INTRAMUSCULAR; INTRAVENOUS at 05:31

## 2022-08-14 RX ADMIN — Medication 25 MILLIGRAM(S): at 21:41

## 2022-08-14 RX ADMIN — Medication 0: at 21:41

## 2022-08-14 RX ADMIN — PIPERACILLIN AND TAZOBACTAM 25 GRAM(S): 4; .5 INJECTION, POWDER, LYOPHILIZED, FOR SOLUTION INTRAVENOUS at 21:40

## 2022-08-14 RX ADMIN — HEPARIN SODIUM 5000 UNIT(S): 5000 INJECTION INTRAVENOUS; SUBCUTANEOUS at 21:39

## 2022-08-14 RX ADMIN — Medication 3 MILLIGRAM(S): at 21:41

## 2022-08-14 RX ADMIN — PIPERACILLIN AND TAZOBACTAM 25 GRAM(S): 4; .5 INJECTION, POWDER, LYOPHILIZED, FOR SOLUTION INTRAVENOUS at 14:35

## 2022-08-14 RX ADMIN — PREGABALIN 1000 MICROGRAM(S): 225 CAPSULE ORAL at 10:21

## 2022-08-14 NOTE — PROGRESS NOTE ADULT - ASSESSMENT
89-year-old male with history of CAD, DM2, HTN, HLD, BPH, prostate CA s/p rad was hospitalized on 8/11/22 and diagnosed with acute cholecystitis, refused all interventions and d/c'd on po abx, called back for return to hospital for +BCx.    #Sepsis with bacteremia 2/2 likely acute cholecystitis  -elevated HR (SVT)  -lactate elevated on ED visit but not now  -WBC elevated  -monitor LFTs  -UCx NG  -BCx from ED visit with gram variable rods (possibly contaminant), S&S pending  -repeat BCx on admission pending  -imaging concerning but not definitive for acute lor and pt with NO RUQ pain  -will repeat HIDA scan in am, discussed w nuclear  -if BCx w true pathogen and HIDA positive, will pursue IR perc lor drain (IR aware but should update Monday)  -Zosyn for now pending S&S  -ID input appreciated  -appreciate cards clearance    #Hx CAD, HTN, HLD  -elevated  troponin likely demand ischemia in setting on infection  -echo as above  -Plavix held since Thursday by family (daughter is a nurse and thought pt might need procedure/OR)  -BB  -appreciate cards input    #SVT   -resolved, likely in setting of infection  -monitor on tele  -monitor lytes    #DM2  -A1c 6.6  -monitor BG,SSI    #hyponatremia, likely hypovolemic  -trend    #DVT ppx- SQH    #family updated at bedside. Of note, pt initially refuses many aspects of care but so far has agreed after discussion of reasoning 89-year-old male with history of CAD, DM2, HTN, HLD, BPH, prostate CA s/p rad was hospitalized on 8/11/22 and diagnosed with acute cholecystitis, refused all interventions and d/c'd on po abx, called back for return to hospital for +BCx.    #Sepsis with bacteremia 2/2 likely acute cholecystitis  -elevated HR (SVT)  -lactate elevated on ED visit but not now  -WBC elevated  -monitor LFTs  -UCx NG  -BCx from ED visit with gram variable rods (possibly contaminant), S&S pending  -repeat BCx on admission pending  -imaging concerning but not definitive for acute lor and pt with NO RUQ pain  -will repeat HIDA scan in am, discussed w nuclear  -if BCx w true pathogen and HIDA positive, will pursue IR perc lor drain (IR aware but should update Monday)  -Zosyn for now pending S&S  -ID input appreciated  -appreciate cards clearance  -surgery involved during initial ED visit, pt refusing lap CCY and is also high cardiac risk     #Hx CAD, HTN, HLD  -elevated  troponin likely demand ischemia in setting on infection  -echo as above  -Plavix held since Thursday by family (daughter is a nurse and thought pt might need procedure/OR)  -BB  -appreciate cards input    #SVT   -resolved, likely in setting of infection  -monitor on tele  -monitor lytes    #DM2  -A1c 6.6  -monitor BG,SSI    #hyponatremia, likely hypovolemic  -trend    #DVT ppx- SQH    #family updated at bedside. Of note, pt initially refuses many aspects of care but so far has agreed after discussion of reasoning

## 2022-08-14 NOTE — CONSULT NOTE ADULT - ASSESSMENT
89 yo M with above PMHx who presents was recently admitted for acute cholecystitis refusing cholecystostomy who presents because of positive BCx. Course complicated by SVT    -SVT has resolved - likely from possible bacteremia (being evaluated to see if BCx are contamination).   -Patient refuses coreg - started on metoprolol tartrate 25mg BID  -Currently refusing cholecystostomy but if he goes for he is an elevated cardiac risk due to his age, h/o CAD/MI (although not since 2017) and his elevated pulm pressured, but for a low risk procedure.  -His plavix was on hold for possible surgery last hospitalize. If no plan for surgery or if OK to be on plavix for cholecystostomy than recommend restarting.    Thank you for allowing me to partake in the care of this patient. Dr. Jony Luna will be back tomorrow and will take over his cardiac care, but do not hesitate to call me if you have any questions or concerns.   89 yo M with above PMHx who presents was recently admitted for acute cholecystitis refusing cholecystostomy who presents because of positive BCx. Course complicated by SVT    -SVT has resolved - likely from possible bacteremia (being evaluated to see if BCx are contamination).   -Patient refuses coreg - started on metoprolol tartrate 25mg BID  -Currently refusing cholecystostomy but if he goes for he is an elevated cardiac risk due to his age, h/o CAD/MI (although not since 2017) and his elevated pulm pressured, but for a low risk procedure.  -His plavix was on hold for possible surgery last hospitalize. If no plan for surgery or if OK to be on plavix for cholecystostomy than recommend restarting.    Thank you for allowing me to partake in the care of this patient. Dr. Jony Luna will be back tomorrow and will take over his cardiac care, if needed, but do not hesitate to call me if you have any questions or concerns.

## 2022-08-14 NOTE — CONSULT NOTE ADULT - ASSESSMENT
89-year-old male with history of CAD, DM2, HTN, HLD, BPH,  s/p rad.Tx for prostate CA was hospitalized on 8/11/22 and diagnosed with acute cholecystitis.  The patient was a high risk for laparoscopic cholecystectomy because of his cardiac condition and cholecystostomy tube placement was offered.  The patient declined the procedure and was discharged home day prior to admit on Augmentin.  The patient was recalled to the emergency room because his blood culture grew gram variable rods.  The patient was started on IV Unasyn in the ER.  In the emergency room the patient developed sinus tachycardia with the elevated heart rate to 140.  Patient denies chest pain or abdominal pain nausea vomiting.     1. acute cholecystitis. bacteremia - gram variable rods  - blood 1 bottle 8/11 growing gram variable rods ? contaminant vs gi tract translocation f/u id  - repeat blood cx pending  - pt declined previous perc cholecystostomy tube - reinforced necessity of this with pt and daughter again   - pt deemed too high risk for surgical intervention  - change unasyn to IV zosyn  - continue with abx coverage  - case d/w hospitalist   - ir/surgery consulted   - monitor temps  - tolerating abx well so far; no side effects noted  - reason for abx use and side effects reviewed with patient  - supportive care  - fu cbc    2. other issues - care per medicine

## 2022-08-14 NOTE — PROGRESS NOTE ADULT - SUBJECTIVE AND OBJECTIVE BOX
HOSPITALIST ATTENDING PROGRESS NOTE    Chart and meds reviewed.  Patient seen and examined.    CC: +BCx    Subjective: Denies RUQ pain but does report sweats, chills, fever. Denies n/v. Discussed care plan extensively w pt, daughter and wife.    All other systems reviewed and found to be negative with the exception of what has been described above.    MEDICATIONS  (STANDING):  cyanocobalamin 1000 MICROGram(s) Oral daily  dextrose 5%. 1000 milliLiter(s) (50 mL/Hr) IV Continuous <Continuous>  dextrose 5%. 1000 milliLiter(s) (100 mL/Hr) IV Continuous <Continuous>  dextrose 50% Injectable 25 Gram(s) IV Push once  dextrose 50% Injectable 12.5 Gram(s) IV Push once  dextrose 50% Injectable 25 Gram(s) IV Push once  glucagon  Injectable 1 milliGRAM(s) IntraMuscular once  heparin   Injectable 5000 Unit(s) SubCutaneous every 12 hours  insulin lispro (ADMELOG) corrective regimen sliding scale   SubCutaneous at bedtime  insulin lispro (ADMELOG) corrective regimen sliding scale   SubCutaneous three times a day before meals  metoprolol tartrate 25 milliGRAM(s) Oral two times a day  piperacillin/tazobactam IVPB.. 3.375 Gram(s) IV Intermittent every 8 hours  sodium chloride 0.9%. 500 milliLiter(s) (75 mL/Hr) IV Continuous <Continuous>    MEDICATIONS  (PRN):  acetaminophen     Tablet .. 650 milliGRAM(s) Oral every 6 hours PRN Temp greater or equal to 38C (100.4F), Mild Pain (1 - 3)  aluminum hydroxide/magnesium hydroxide/simethicone Suspension 30 milliLiter(s) Oral every 4 hours PRN Dyspepsia  dextrose Oral Gel 15 Gram(s) Oral once PRN Blood Glucose LESS THAN 70 milliGRAM(s)/deciliter  LORazepam     Tablet 0.5 milliGRAM(s) Oral once PRN Agitation  melatonin 3 milliGRAM(s) Oral at bedtime PRN Insomnia  ondansetron Injectable 4 milliGRAM(s) IV Push once PRN Nausea and/or Vomiting      VITALS:  T(F): 98.1 (22 @ 19:15), Max: 98.9 (22 @ 09:21)  HR: 67 (22 @ 19:15) (67 - 137)  BP: 152/52 (22 @ 19:15) (107/56 - 152/52)  RR: 19 (22 @ 19:15) (18 - 20)  SpO2: 94% (22 @ 19:15) (94% - 96%)  Wt(kg): --    I&O's Summary      CAPILLARY BLOOD GLUCOSE      POCT Blood Glucose.: 111 mg/dL (14 Aug 2022 16:58)  POCT Blood Glucose.: 107 mg/dL (14 Aug 2022 11:22)  POCT Blood Glucose.: 101 mg/dL (14 Aug 2022 08:00)  POCT Blood Glucose.: 122 mg/dL (13 Aug 2022 23:12)      PHYSICAL EXAM:  Gen: NAD  HEENT:  pupils equal and reactive, EOMI, no oropharyngeal lesions, erythema, exudates, oral thrush  NECK:   supple, no carotid bruits, no palpable lymph nodes, no thyromegaly  CV:  +S1, +S2, regular, no murmurs or rubs  RESP:   lungs clear to auscultation bilaterally, no wheezing, rales, rhonchi, good air entry bilaterally  BREAST:  not examined  GI:  abdomen soft, non-tender, non-distended, normal BS, no bruits, no abdominal masses, no palpable masses  RECTAL:  not examined  :  not examined  MSK:   normal muscle tone, no atrophy, no rigidity, no contractions  EXT:  no clubbing, no cyanosis, no edema, no calf pain, swelling or erythema  VASCULAR:  pulses equal and symmetric in the upper and lower extremities  NEURO:  AAOX3, no focal neurological deficits, follows all commands, able to move extremities spontaneously  SKIN:  no ulcers, lesions or rashes    LABS:                            9.8    14.39 )-----------( 188      ( 14 Aug 2022 04:55 )             29.2         132<L>  |  106  |  12  ----------------------------<  97  4.0   |  21<L>  |  0.54    Ca    7.9<L>      14 Aug 2022 04:55    TPro  5.5<L>  /  Alb  2.2<L>  /  TBili  0.3  /  DBili  <0.1  /  AST  36  /  ALT  28  /  AlkPhos  75  0814        LIVER FUNCTIONS - ( 14 Aug 2022 04:55 )  Alb: 2.2 g/dL / Pro: 5.5 gm/dL / ALK PHOS: 75 U/L / ALT: 28 U/L / AST: 36 U/L / GGT: x             Urinalysis Basic - ( 13 Aug 2022 16:26 )    Color: Yellow / Appearance: Clear / S.020 / pH: x  Gluc: x / Ketone: Moderate  / Bili: Negative / Urobili: Negative   Blood: x / Protein: 100 / Nitrite: Negative   Leuk Esterase: Negative / RBC: 3-5 /HPF / WBC 0-2   Sq Epi: x / Non Sq Epi: Negative / Bacteria: Occasional    CULTURES:  BCx  gram variable rods  BCx  pending  UCx NG    Additional results/Imaging, I have personally reviewed:  CXR 22: clear    RUQ u/s 22: Dilated gallbladder with cholelithiasis and gallbladder sludge. Sonographic Turner's sign cannot be adequately assessed as the patient received pain medication. Findings are equivocal for acute cholecystitis. Interpolar right renal cyst. Increased renal parenchymal echogenicity, which can be seen with underlying medical renal disease. Coarsened hepatic parenchymal echotexture, which may be related to technical factors versus sequelae of underlying parenchymal disease. Clinical correlation is advised..    CT a/p 22: Dilated gallbladder, cholelithiasis and trace pericholecystic stranding, highly suspicious for acute cholecystitis. As corresponding sonographic findings were equivocal, if there is still clinical question of acute cholecystitis, hepatobiliary scan can be obtained for more definitive evaluation.. Probable omental lipoma. Urinary bladder wall thickening likely related to sequelae of chronic outlet obstruction from marked prostate enlargement. Superimposed cystitis should be evaluated for clinically, with correlation with urinalysis.    HIDA 22:  Limited/Incomplete study.    Echo 22: The left ventricle is normal in size, wall motion, and contractility. Mild concentric left ventricular hypertrophy is present. Estimated left ventricular ejection fraction is 60-65 %. Normal appearing right ventricle structure and function. Mild aortic sclerosis is present with normal valvular opening. Mild to moderate aortic regurgitation is present. Mild mitral annular calcification is present. The mitral valve leaflets appear mildly thickened. Mild (1+) mitral regurgitation is present. The tricuspid valve leaflets are thin and pliable; valve motion is normal. Moderate (2+) eccentric tricuspid valve regurgitation is present. Moderate to severe pulmonary hypertension. Normal appearing pulmonic valve structure. Mild pulmonic valvular regurgitation (1+) is present. No evidence of pericardial effusion.    Telemetry, personally reviewed:  22: sinus, SVT

## 2022-08-15 DIAGNOSIS — K81.0 ACUTE CHOLECYSTITIS: ICD-10-CM

## 2022-08-15 LAB
ALBUMIN SERPL ELPH-MCNC: 2.2 G/DL — LOW (ref 3.3–5)
ALP SERPL-CCNC: 78 U/L — SIGNIFICANT CHANGE UP (ref 40–120)
ALT FLD-CCNC: 31 U/L — SIGNIFICANT CHANGE UP (ref 12–78)
ANION GAP SERPL CALC-SCNC: 9 MMOL/L — SIGNIFICANT CHANGE UP (ref 5–17)
APTT BLD: 25.6 SEC — LOW (ref 27.5–35.5)
AST SERPL-CCNC: 33 U/L — SIGNIFICANT CHANGE UP (ref 15–37)
BILIRUB SERPL-MCNC: 0.5 MG/DL — SIGNIFICANT CHANGE UP (ref 0.2–1.2)
BUN SERPL-MCNC: 14 MG/DL — SIGNIFICANT CHANGE UP (ref 7–23)
CALCIUM SERPL-MCNC: 7.8 MG/DL — LOW (ref 8.5–10.1)
CHLORIDE SERPL-SCNC: 106 MMOL/L — SIGNIFICANT CHANGE UP (ref 96–108)
CO2 SERPL-SCNC: 23 MMOL/L — SIGNIFICANT CHANGE UP (ref 22–31)
CREAT SERPL-MCNC: 0.68 MG/DL — SIGNIFICANT CHANGE UP (ref 0.5–1.3)
EGFR: 88 ML/MIN/1.73M2 — SIGNIFICANT CHANGE UP
GLUCOSE SERPL-MCNC: 100 MG/DL — HIGH (ref 70–99)
HCT VFR BLD CALC: 29.2 % — LOW (ref 39–50)
HGB BLD-MCNC: 9.5 G/DL — LOW (ref 13–17)
INR BLD: 1.15 RATIO — SIGNIFICANT CHANGE UP (ref 0.88–1.16)
MCHC RBC-ENTMCNC: 28 PG — SIGNIFICANT CHANGE UP (ref 27–34)
MCHC RBC-ENTMCNC: 32.5 GM/DL — SIGNIFICANT CHANGE UP (ref 32–36)
MCV RBC AUTO: 86.1 FL — SIGNIFICANT CHANGE UP (ref 80–100)
PLATELET # BLD AUTO: 213 K/UL — SIGNIFICANT CHANGE UP (ref 150–400)
POTASSIUM SERPL-MCNC: 3.2 MMOL/L — LOW (ref 3.5–5.3)
POTASSIUM SERPL-SCNC: 3.2 MMOL/L — LOW (ref 3.5–5.3)
PROT SERPL-MCNC: 5.5 GM/DL — LOW (ref 6–8.3)
PROTHROM AB SERPL-ACNC: 13.4 SEC — SIGNIFICANT CHANGE UP (ref 10.5–13.4)
RBC # BLD: 3.39 M/UL — LOW (ref 4.2–5.8)
RBC # FLD: 13.3 % — SIGNIFICANT CHANGE UP (ref 10.3–14.5)
SODIUM SERPL-SCNC: 138 MMOL/L — SIGNIFICANT CHANGE UP (ref 135–145)
WBC # BLD: 10.45 K/UL — SIGNIFICANT CHANGE UP (ref 3.8–10.5)
WBC # FLD AUTO: 10.45 K/UL — SIGNIFICANT CHANGE UP (ref 3.8–10.5)

## 2022-08-15 PROCEDURE — 99221 1ST HOSP IP/OBS SF/LOW 40: CPT

## 2022-08-15 PROCEDURE — 99233 SBSQ HOSP IP/OBS HIGH 50: CPT

## 2022-08-15 PROCEDURE — 78226 HEPATOBILIARY SYSTEM IMAGING: CPT | Mod: 26

## 2022-08-15 RX ORDER — TAMSULOSIN HYDROCHLORIDE 0.4 MG/1
0.4 CAPSULE ORAL AT BEDTIME
Refills: 0 | Status: DISCONTINUED | OUTPATIENT
Start: 2022-08-15 | End: 2022-08-23

## 2022-08-15 RX ORDER — METOPROLOL TARTRATE 50 MG
50 TABLET ORAL EVERY 12 HOURS
Refills: 0 | Status: DISCONTINUED | OUTPATIENT
Start: 2022-08-15 | End: 2022-08-17

## 2022-08-15 RX ORDER — POTASSIUM CHLORIDE 20 MEQ
40 PACKET (EA) ORAL EVERY 4 HOURS
Refills: 0 | Status: COMPLETED | OUTPATIENT
Start: 2022-08-15 | End: 2022-08-15

## 2022-08-15 RX ADMIN — PREGABALIN 1000 MICROGRAM(S): 225 CAPSULE ORAL at 10:45

## 2022-08-15 RX ADMIN — Medication 50 MILLIGRAM(S): at 22:11

## 2022-08-15 RX ADMIN — Medication 650 MILLIGRAM(S): at 23:44

## 2022-08-15 RX ADMIN — PIPERACILLIN AND TAZOBACTAM 25 GRAM(S): 4; .5 INJECTION, POWDER, LYOPHILIZED, FOR SOLUTION INTRAVENOUS at 14:16

## 2022-08-15 RX ADMIN — PIPERACILLIN AND TAZOBACTAM 25 GRAM(S): 4; .5 INJECTION, POWDER, LYOPHILIZED, FOR SOLUTION INTRAVENOUS at 22:11

## 2022-08-15 RX ADMIN — Medication 40 MILLIEQUIVALENT(S): at 14:15

## 2022-08-15 RX ADMIN — HEPARIN SODIUM 5000 UNIT(S): 5000 INJECTION INTRAVENOUS; SUBCUTANEOUS at 10:39

## 2022-08-15 RX ADMIN — Medication 0.5 MILLIGRAM(S): at 11:33

## 2022-08-15 RX ADMIN — PIPERACILLIN AND TAZOBACTAM 25 GRAM(S): 4; .5 INJECTION, POWDER, LYOPHILIZED, FOR SOLUTION INTRAVENOUS at 05:30

## 2022-08-15 RX ADMIN — TAMSULOSIN HYDROCHLORIDE 0.4 MILLIGRAM(S): 0.4 CAPSULE ORAL at 22:11

## 2022-08-15 RX ADMIN — Medication 40 MILLIEQUIVALENT(S): at 17:28

## 2022-08-15 RX ADMIN — HEPARIN SODIUM 5000 UNIT(S): 5000 INJECTION INTRAVENOUS; SUBCUTANEOUS at 22:11

## 2022-08-15 NOTE — PROGRESS NOTE ADULT - SUBJECTIVE AND OBJECTIVE BOX
CHIEF COMPLAINT:  Presents because of +BCx from recent hospitalization    HPI: The patient is a 89-year-old male with history of CAD, DM2, HTN, HLD, BPH, prostate CA s/p rad was hospitalized on 22 and diagnosed with acute cholecystitis. He was recommended cholecystostomy because of his elevated cardiac risk and he refused. He was very upset about being made NPO as refused all treatments and wanted to go home. He was discharged home yesterday on Augmentin.  The patient was recalled to the emergency room because his blood culture grew gram variable rods.  The patient was started on IV Unasyn in the ER.  In the emergency room the patient developed sinus tachycardia/SVT with the elevated heart rate to 140. He was given coreg and the above antibiotics and SVT resolved.    22: Patient upset that he got coreg. He states he does not tolerate coreg because it causes him dizziness and only wants metoprolol. He also continues to refuse any procedures and just wanted to come to the hospital for the IV antibiotics. He is upset again that he is NPO.    He denies any fevers, chils, CP, palp, SOB, current abd pain, N/V, dizziness, syncope, orthopnea, PND    8/15/22 SVT on monitor,, patient offers no complaints    PMHx:  PAST MEDICAL & SURGICAL HISTORY:  BPH (benign prostatic hyperplasia)  HLD (hyperlipidemia)  GERD (gastroesophageal reflux disease)  Prostate cancer s/p radiation  CAD S/P percutaneous coronary angioplasty 2014  DM2  Pulm HTN    Social History:  Former smoker  Denies any significant etoh or any illicit drug use    FAMILY HISTORY:   FAMILY HISTORY:  Family history of MI (myocardial infarction) (Father)  Family history of uterine cancer (Mother)    ALLERGIES:  Allergies  No Known Allergies    REVIEW OF SYSTEMS:  10 system ROS was obtained, all pertinent positives and negatives are in HPI otherwise they were negative.  Vital Signs Last 24 Hrs  T(C): 36.7 (14 Aug 2022 19:15), Max: 37.2 (14 Aug 2022 09:21)  T(F): 98.1 (14 Aug 2022 19:15), Max: 98.9 (14 Aug 2022 09:21)  HR: 70 (14 Aug 2022 21:30) (67 - 125)  BP: 150/52 (14 Aug 2022 21:30) (125/72 - 152/52)  BP(mean): --  RR: 19 (14 Aug 2022 19:15) (19 - 19)  SpO2: 94% (14 Aug 2022 19:15) (94% - 94%)    Parameters below as of 14 Aug 2022 19:15  Patient On (Oxygen Delivery Method): room air        PHYSICAL EXAM:   Constitutional: awake and alert in NAD  HEENT: EOMI, Normal Hearing, MMM  Neck: Soft and supple, No LAD, mild + JVD, no carotid bruit  Respiratory: Breath sounds are clear bilaterally, No wheezing, rales or rhonchi, good air movement  Cardiovascular: S1 and S2, regular rate and rhythm, soft systolic murmur at LSB. PMI is nondisplaced  Gastrointestinal: Bowel Sounds present, soft, nontender, nondistended, no guarding, no rebound  Extremities: Warm and well perfused, no peripheral edema  Neurological: A/O x 3, no focal deficits appreciated  Skin: No rashes appreciated          MEDICATIONS  (STANDING):  cyanocobalamin 1000 MICROGram(s) Oral daily  dextrose 5%. 1000 milliLiter(s) (50 mL/Hr) IV Continuous <Continuous>  dextrose 5%. 1000 milliLiter(s) (100 mL/Hr) IV Continuous <Continuous>  dextrose 50% Injectable 25 Gram(s) IV Push once  dextrose 50% Injectable 12.5 Gram(s) IV Push once  dextrose 50% Injectable 25 Gram(s) IV Push once  glucagon  Injectable 1 milliGRAM(s) IntraMuscular once  heparin   Injectable 5000 Unit(s) SubCutaneous every 12 hours  insulin lispro (ADMELOG) corrective regimen sliding scale   SubCutaneous at bedtime  insulin lispro (ADMELOG) corrective regimen sliding scale   SubCutaneous three times a day before meals  metoprolol tartrate 50 milliGRAM(s) Oral every 12 hours  piperacillin/tazobactam IVPB.. 3.375 Gram(s) IV Intermittent every 8 hours  sodium chloride 0.9%. 500 milliLiter(s) (75 mL/Hr) IV Continuous <Continuous>    MEDICATIONS  (PRN):  acetaminophen     Tablet .. 650 milliGRAM(s) Oral every 6 hours PRN Temp greater or equal to 38C (100.4F), Mild Pain (1 - 3)  aluminum hydroxide/magnesium hydroxide/simethicone Suspension 30 milliLiter(s) Oral every 4 hours PRN Dyspepsia  dextrose Oral Gel 15 Gram(s) Oral once PRN Blood Glucose LESS THAN 70 milliGRAM(s)/deciliter  LORazepam     Tablet 0.5 milliGRAM(s) Oral once PRN Agitation  melatonin 3 milliGRAM(s) Oral at bedtime PRN Insomnia  ondansetron Injectable 4 milliGRAM(s) IV Push once PRN Nausea and/or Vomiting      LABS: All Labs Reviewed:                             9.8    14.39 )-----------( 188      ( 14 Aug 2022 04:55 )             29.2   08-14    132<L>  |  106  |  12  ----------------------------<  97  4.0   |  21<L>  |  0.54    Ca    7.9<L>      14 Aug 2022 04:55    TPro  5.5<L>  /  Alb  2.2<L>  /  TBili  0.3  /  DBili  <0.1  /  AST  36  /  ALT  28  /  AlkPhos  75  08-14        BLOOD CULTURES: Organism --  Gram Stain Blood -- Gram Stain --  Specimen Source Clean Catch None  Culture-Blood --    Organism Blood Culture PCR  Gram Stain Blood -- Gram Stain   Growth in anaerobic bottle: Gram Variable Rods  Specimen Source .Blood None  Culture-Blood --    RADIOLOGY:    Reviewed in EMR    EK/13/22, my interpretation: NSR low voltage nonspecific ST changes     TELEMETRY:    Reviewed. SVT alt with SR    ECHO:    ACC: [id number] EXAM:  ECHO TTE WO CON COMP W DOPP                        PROCEDURE DATE:  2022      Indications   1) Z01.810 Encounter for preprocedural cardiovascular examination    M-Mode Measurements (cm)     LVEDd: 4.8 cm          LVESd: 3.02 cm   IVSEd: 1.23 cm   LVPWd: 1.13 cm            AO Root Dimension: 3.5 cm                             ACS: 1.9 cm                             LA: 3.8 cm    Doppler Measurements:     AV Velocity:146 cm/s               MV Peak E-Wave: 121 cm/s   AV Peak Gradient: 8.53 mmHg        MV Peak A-Wave: 122 cm/s                                      MV E/A Ratio: 0.99 %   TR Velocity:383 cm/s               MV Peak Gradient: 5.86 mmHg   TR Gradient:58.6756 mmHg   Estimated RAP:8 mmHg   RVSP:67 mmHg     Summary     The left ventricle is normal in size, wall motion, and contractility.   Mild concentric left ventricular hypertrophy is present.   Estimated left ventricular ejection fraction is 60-65 %.   Normal appearing right ventricle structure and function.   Mild aortic sclerosis is present with normal valvular opening.   Mild to moderate aortic regurgitation is present.   Mild mitral annular calcification is present.   The mitral valve leaflets appear mildly thickened.   Mild (1+) mitral regurgitation is present.   The tricuspid valve leaflets are thin and pliable; valve motion is normal.   Moderate (2+) eccentric tricuspid valve regurgitation is present.   Moderate to severe pulmonary hypertension.   Normal appearing pulmonic valve structure.   Mild pulmonic valvular regurgitation (1+) is present.   No evidence of pericardial effusion.

## 2022-08-15 NOTE — PROGRESS NOTE ADULT - ASSESSMENT
89 yo M with above PMHx who presents was recently admitted for acute cholecystitis refusing cholecystostomy who presents because of positive BCx. Course complicated by SVT    -SVT has resolved but intermittnet - likely from possible bacteremia (being evaluated to see if BCx are contamination).   increased metoprolol to 50 BID from 25 BID    -Currently refusing cholecystostomy but if he goes for he is an elevated cardiac risk due to his age, h/o CAD/MI (although not since 2017) and his elevated pulm pressured, but for a low risk procedure.  -His plavix was on hold for possible surgery last hospitalize. If no plan for surgery or if OK to be on plavix for cholecystostomy than recommend restarting.    CAD - Minimal elevation in CE,  likely due to physiologic strain -recommend statin ( was on lipitor 10) when able to take PO  Ideally should have ischemic work up when stable , but patient has repeated refused       will continue to follow

## 2022-08-15 NOTE — PROGRESS NOTE ADULT - ASSESSMENT
89-year-old male with DM, HTN, HLD, CAD, BPH, prostate CA s/p rad was hospitalized on 8/11/22 and diagnosed with acute cholecystitis, declined interventions and was discharged on PO abx, called back for positive blood culture (Gram variable rods), placed on Zosyn and admitted to Medicine.     Sepsis with bacteremia due to acute cholecystitis  Lactate elevated on ED visit but not now. WBC elevated on admission. HIDA today positive, suggestive of acute cholecystitis. Patient overall improved with Zosyn, WBC downtrended, consulted IR for cholecystostomy drain placement.  - IR for drain placement  - Trend LFTs  - Continue Zosyn    CAD, HTN, HLD  Elevated  troponin likely demand ischemia in setting on infection. No regional wall motion abnormality on echo.   - Continue to hold clopidogrel  - Continue beta blocker    SVT   Resolved, likely in setting of infection  - Monitor on tele  - Monitor lytes  - Beta blocker    DM2  A1c 6.6  - Monitor glucose  - Continue insulin    Hypovolemic hyponatremia  Improved.

## 2022-08-15 NOTE — CONSULT NOTE ADULT - PROBLEM SELECTOR RECOMMENDATION 9
- Pt currently stable, plan for cholecystostomy tomorrow with anesthesia  - Keep pt NPO after midnight  - Hold ASA  - Hold heparin 4 hours prior  - Repeat AM labs

## 2022-08-15 NOTE — CONSULT NOTE ADULT - ASSESSMENT
89yo M with positive HIDA scan, referred to IR for cholecystostomy placement  - Pt is consentable  - WBC WNL  - Covid negative 8/13  - HIDA +

## 2022-08-15 NOTE — PROGRESS NOTE ADULT - SUBJECTIVE AND OBJECTIVE BOX
Chief Complaint: Called back for positive blood cultures    Interval Hx: Patient with some mild RUQ discomfort, otherwise feels well and is eager to resume diet. No fevers today. No rigors. No diaphoresis. Feels improved since admission. WBC improved, 17 to 11. Rpt Bld cx with no growth to date. HIDA scan done today is significant however, suggestive of acute cholecystitis. IR consulted. Planning cholecystostomy drain placement tomorrow.     ROS: All other systems reviewed and found to be negative with the exception of what has been described above.    Vitals:  T(F): 98.1 (15 Aug 2022 07:30), Max: 98.1 (14 Aug 2022 19:15)  HR: 67 (15 Aug 2022 07:30) (67 - 139)  BP: 126/39 (15 Aug 2022 07:30) (126/39 - 152/52)  RR: 18 (15 Aug 2022 07:30) (18 - 19)  SpO2: 96% (15 Aug 2022 07:30) (94% - 96%) on RA    Exam:  Gen: No acute distress  HEENT: NCAT PERRL EOMI MMM  Neck: Supple, no LAD, no JVD  CVS:  S1 S2 normal, RRR  Chest: Normal resp effort, lungs CTA b/L  Abd: +BS, non distended, soft, minimally tender to deep palpation in RUQ, no guarding or rebound  Ext: No edema or calf tenderness  Skin: Warm, dry  Neuro: A+OX3, no deficits    Labs:                        9.5    10.45 )-------( 213             29.2       138  |  106  |  14  ----------------------<  100  3.2   |   23   |  0.68    Ca 7.8 (WNL when corrected for alb)    TPro  5.5  /  Alb  2.2  /  TBili  0.5  /  DBili  x   /  AST  33  /  ALT  31  /  AlkPhos  78      Urinalysis Basic - ( 13 Aug 2022 16:26 )  Color: Yellow / Appearance: Clear / S.020 / pH: x  Gluc: x / Ketone: Moderate  / Bili: Negative / Urobili: Negative   Blood: x / Protein: 100 / Nitrite: Negative   Leuk Esterase: Negative / RBC: 3-5 /HPF / WBC 0-2   Sq Epi: x / Non Sq Epi: Negative / Bacteria: Occasional    Micro:  Urine culture : Negative  Blood culture : Negative  Blood culture : Negative  Urine culture : Negative  Blood culture : Gram variable rods  Blood culture : Negative    Imaging:  NM HIDA scan 8/15: Prompt, homogeneous uptake of radiotracer by the hepatocytes. Activity is first seen in the bowel at 20 minutes. The gallbladder is not seen at any time during the study. There is good clearance of activity from the liver by the end of the study. Abnormal hepatobiliary scan. In the proper clinical setting, findings are consistent with acute cholecystitis.    Cardiac Testing:  (Prior admission)  Echo 22: The left ventricle is normal in size, wall motion, and contractility. Mild concentric left ventricular hypertrophy is present. Estimated left ventricular ejection fraction is 60-65 %. Normal appearing right ventricle structure and function. Mild aortic sclerosis is present with normal valvular opening. Mild to moderate aortic regurgitation is present. Mild mitral annular calcification is present. The mitral valve leaflets appear mildly thickened. Mild (1+) mitral regurgitation is present. The tricuspid valve leaflets are thin and pliable; valve motion is normal. Moderate (2+) eccentric tricuspid valve regurgitation is present. Moderate to severe pulmonary hypertension. Normal appearing pulmonic valve structure. Mild pulmonic valvular regurgitation (1+) is present. No evidence of pericardial effusion.    Meds:  MEDICATIONS  (STANDING):  cyanocobalamin 1000 MICROGram(s) Oral daily  heparin   Injectable 5000 Unit(s) SubCutaneous every 12 hours  insulin lispro (ADMELOG) corrective regimen sliding scale   SubCutaneous at bedtime  insulin lispro (ADMELOG) corrective regimen sliding scale   SubCutaneous three times a day before meals  metoprolol tartrate 50 milliGRAM(s) Oral every 12 hours  piperacillin/tazobactam IVPB.. 3.375 Gram(s) IV Intermittent every 8 hours  tamsulosin 0.4 milliGRAM(s) Oral at bedtime    MEDICATIONS  (PRN):  acetaminophen     Tablet .. 650 milliGRAM(s) Oral every 6 hours PRN Temp greater or equal to 38C (100.4F), Mild Pain (1 - 3)  aluminum hydroxide/magnesium hydroxide/simethicone Suspension 30 milliLiter(s) Oral every 4 hours PRN Dyspepsia  dextrose Oral Gel 15 Gram(s) Oral once PRN Blood Glucose LESS THAN 70 milliGRAM(s)/deciliter  melatonin 3 milliGRAM(s) Oral at bedtime PRN Insomnia  ondansetron Injectable 4 milliGRAM(s) IV Push once PRN Nausea and/or Vomiting

## 2022-08-15 NOTE — PROGRESS NOTE ADULT - ASSESSMENT
89-year-old male with history of CAD, DM2, HTN, HLD, BPH,  s/p rad.Tx for prostate CA was hospitalized on 8/11/22 and diagnosed with acute cholecystitis.  The patient was a high risk for laparoscopic cholecystectomy because of his cardiac condition and cholecystostomy tube placement was offered.  The patient declined the procedure and was discharged home day prior to admit on Augmentin.  The patient was recalled to the emergency room because his blood culture grew gram variable rods.  The patient was started on IV Unasyn in the ER.  In the emergency room the patient developed sinus tachycardia with the elevated heart rate to 140.  Patient denies chest pain or abdominal pain nausea vomiting.     1. acute cholecystitis. bacteremia - gram variable rods  - blood 1 bottle 8/11 growing gram variable rods ? contaminant vs gi tract translocation f/u id  - repeat blood cx no growth 8/13  - pt deemed too high risk for surgical intervention  - on IV zosyn #2  - continue with abx coverage  - ir/surgery consulted f/u repeat hida scan  - monitor temps  - tolerating abx well so far; no side effects noted  - reason for abx use and side effects reviewed with patient  - supportive care  - fu cbc    2. other issues - care per medicine

## 2022-08-15 NOTE — PROGRESS NOTE ADULT - SUBJECTIVE AND OBJECTIVE BOX
Date of service: 08-15-22 @ 11:39    pt seen and examined  laying in bed, nad  confused  no fevers  ruq no abd discomfort    ROS: no fever or chills; denies dizziness, no HA, no SOB or cough, no diarrhea or constipation; no dysuria, no urinary frequency, no legs pain, no rashes    MEDICATIONS  (STANDING):  cyanocobalamin 1000 MICROGram(s) Oral daily  dextrose 5%. 1000 milliLiter(s) (50 mL/Hr) IV Continuous <Continuous>  dextrose 5%. 1000 milliLiter(s) (100 mL/Hr) IV Continuous <Continuous>  dextrose 50% Injectable 25 Gram(s) IV Push once  dextrose 50% Injectable 12.5 Gram(s) IV Push once  dextrose 50% Injectable 25 Gram(s) IV Push once  glucagon  Injectable 1 milliGRAM(s) IntraMuscular once  heparin   Injectable 5000 Unit(s) SubCutaneous every 12 hours  insulin lispro (ADMELOG) corrective regimen sliding scale   SubCutaneous at bedtime  insulin lispro (ADMELOG) corrective regimen sliding scale   SubCutaneous three times a day before meals  metoprolol tartrate 50 milliGRAM(s) Oral every 12 hours  piperacillin/tazobactam IVPB.. 3.375 Gram(s) IV Intermittent every 8 hours  potassium chloride    Tablet ER 40 milliEquivalent(s) Oral every 4 hours    Vital Signs Last 24 Hrs  T(C): 36.7 (15 Aug 2022 07:30), Max: 36.7 (14 Aug 2022 19:15)  T(F): 98.1 (15 Aug 2022 07:30), Max: 98.1 (14 Aug 2022 19:15)  HR: 67 (15 Aug 2022 07:30) (67 - 139)  BP: 126/39 (15 Aug 2022 07:30) (126/39 - 152/52)  BP(mean): 88 (15 Aug 2022 06:28) (88 - 88)  RR: 18 (15 Aug 2022 07:30) (18 - 19)  SpO2: 96% (15 Aug 2022 07:30) (94% - 96%)    Parameters below as of 15 Aug 2022 07:30  Patient On (Oxygen Delivery Method): room air    PE:  Constitutional: frail looking  HEENT: NC/AT, EOMI, PERRLA, conjunctivae clear; ears and nose atraumatic; pharynx benign  Neck: supple; thyroid not palpable  Back: no tenderness  Respiratory: respiratory effort normal; clear to auscultation  Cardiovascular: S1S2 regular, no murmurs  Abdomen: soft,  tender, not distended, positive BS; liver and spleen WNL  Genitourinary: no suprapubic tenderness  Lymphatic: no LN palpable  Musculoskeletal: no muscle tenderness, no joint swelling or tenderness  Extremities: no pedal edema  Neurological/ Psychiatric: AxOx3, Judgement and insight normal;  moving all extremities  Skin: no rashes; no palpable lesions    Labs: all available labs reviewed                                   9.5    10.45 )-----------( 213      ( 15 Aug 2022 06:51 )             29.2     08-15    138  |  106  |  14  ----------------------------<  100<H>  3.2<L>   |  23  |  0.68    Ca    7.8<L>      15 Aug 2022 06:51    TPro  5.5<L>  /  Alb  2.2<L>  /  TBili  0.5  /  DBili  x   /  AST  33  /  ALT  31  /  AlkPhos  78  08-15      Urinalysis Basic - ( 13 Aug 2022 16:26 )    Color: Yellow / Appearance: Clear / S.020 / pH: x  Gluc: x / Ketone: Moderate  / Bili: Negative / Urobili: Negative   Blood: x / Protein: 100 / Nitrite: Negative   Leuk Esterase: Negative / RBC: 3-5 /HPF / WBC 0-2   Sq Epi: x / Non Sq Epi: Negative / Bacteria: Occasional    Culture - Urine (22 @ 01:00)   Specimen Source: Clean Catch None   Culture Results:   No growth   Culture - Blood (22 @ 23:04)   Specimen Source: .Blood None   Culture Results:   No growth to date.   Culture - Blood (22 @ 23:04)   Gram Stain:   Growth in anaerobic bottle: Gram Variable Rods   - Blood PCR Panel: NEG       Radiology: all available radiological tests reviewed    Advanced directives addressed: full resuscitation

## 2022-08-16 LAB
ALBUMIN SERPL ELPH-MCNC: 2 G/DL — LOW (ref 3.3–5)
ALP SERPL-CCNC: 80 U/L — SIGNIFICANT CHANGE UP (ref 40–120)
ALT FLD-CCNC: 30 U/L — SIGNIFICANT CHANGE UP (ref 12–78)
ANION GAP SERPL CALC-SCNC: 3 MMOL/L — LOW (ref 5–17)
AST SERPL-CCNC: 24 U/L — SIGNIFICANT CHANGE UP (ref 15–37)
BASOPHILS # BLD AUTO: 0.01 K/UL — SIGNIFICANT CHANGE UP (ref 0–0.2)
BASOPHILS NFR BLD AUTO: 0.1 % — SIGNIFICANT CHANGE UP (ref 0–2)
BILIRUB SERPL-MCNC: 0.4 MG/DL — SIGNIFICANT CHANGE UP (ref 0.2–1.2)
BUN SERPL-MCNC: 9 MG/DL — SIGNIFICANT CHANGE UP (ref 7–23)
CALCIUM SERPL-MCNC: 7.5 MG/DL — LOW (ref 8.5–10.1)
CHLORIDE SERPL-SCNC: 108 MMOL/L — SIGNIFICANT CHANGE UP (ref 96–108)
CO2 SERPL-SCNC: 26 MMOL/L — SIGNIFICANT CHANGE UP (ref 22–31)
CREAT SERPL-MCNC: 0.66 MG/DL — SIGNIFICANT CHANGE UP (ref 0.5–1.3)
EGFR: 89 ML/MIN/1.73M2 — SIGNIFICANT CHANGE UP
EOSINOPHIL # BLD AUTO: 0.08 K/UL — SIGNIFICANT CHANGE UP (ref 0–0.5)
EOSINOPHIL NFR BLD AUTO: 0.8 % — SIGNIFICANT CHANGE UP (ref 0–6)
GLUCOSE SERPL-MCNC: 116 MG/DL — HIGH (ref 70–99)
GRAM STN FLD: SIGNIFICANT CHANGE UP
HCT VFR BLD CALC: 28.2 % — LOW (ref 39–50)
HGB BLD-MCNC: 9 G/DL — LOW (ref 13–17)
IMM GRANULOCYTES NFR BLD AUTO: 0.5 % — SIGNIFICANT CHANGE UP (ref 0–1.5)
LYMPHOCYTES # BLD AUTO: 0.93 K/UL — LOW (ref 1–3.3)
LYMPHOCYTES # BLD AUTO: 9.4 % — LOW (ref 13–44)
MCHC RBC-ENTMCNC: 28 PG — SIGNIFICANT CHANGE UP (ref 27–34)
MCHC RBC-ENTMCNC: 31.9 GM/DL — LOW (ref 32–36)
MCV RBC AUTO: 87.9 FL — SIGNIFICANT CHANGE UP (ref 80–100)
MONOCYTES # BLD AUTO: 1.12 K/UL — HIGH (ref 0–0.9)
MONOCYTES NFR BLD AUTO: 11.3 % — SIGNIFICANT CHANGE UP (ref 2–14)
NEUTROPHILS # BLD AUTO: 7.68 K/UL — HIGH (ref 1.8–7.4)
NEUTROPHILS NFR BLD AUTO: 77.9 % — HIGH (ref 43–77)
PLATELET # BLD AUTO: 220 K/UL — SIGNIFICANT CHANGE UP (ref 150–400)
POTASSIUM SERPL-MCNC: 3.3 MMOL/L — LOW (ref 3.5–5.3)
POTASSIUM SERPL-SCNC: 3.3 MMOL/L — LOW (ref 3.5–5.3)
PROT SERPL-MCNC: 5.3 GM/DL — LOW (ref 6–8.3)
RBC # BLD: 3.21 M/UL — LOW (ref 4.2–5.8)
RBC # FLD: 13.4 % — SIGNIFICANT CHANGE UP (ref 10.3–14.5)
SODIUM SERPL-SCNC: 137 MMOL/L — SIGNIFICANT CHANGE UP (ref 135–145)
SPECIMEN SOURCE: SIGNIFICANT CHANGE UP
WBC # BLD: 9.87 K/UL — SIGNIFICANT CHANGE UP (ref 3.8–10.5)
WBC # FLD AUTO: 9.87 K/UL — SIGNIFICANT CHANGE UP (ref 3.8–10.5)

## 2022-08-16 PROCEDURE — 47490 INCISION OF GALLBLADDER: CPT

## 2022-08-16 PROCEDURE — 99233 SBSQ HOSP IP/OBS HIGH 50: CPT

## 2022-08-16 RX ORDER — ONDANSETRON 8 MG/1
4 TABLET, FILM COATED ORAL ONCE
Refills: 0 | Status: DISCONTINUED | OUTPATIENT
Start: 2022-08-16 | End: 2022-08-16

## 2022-08-16 RX ORDER — SODIUM CHLORIDE 9 MG/ML
1000 INJECTION, SOLUTION INTRAVENOUS
Refills: 0 | Status: DISCONTINUED | OUTPATIENT
Start: 2022-08-16 | End: 2022-08-16

## 2022-08-16 RX ORDER — OXYCODONE HYDROCHLORIDE 5 MG/1
5 TABLET ORAL ONCE
Refills: 0 | Status: DISCONTINUED | OUTPATIENT
Start: 2022-08-16 | End: 2022-08-16

## 2022-08-16 RX ORDER — POTASSIUM CHLORIDE 20 MEQ
40 PACKET (EA) ORAL EVERY 4 HOURS
Refills: 0 | Status: COMPLETED | OUTPATIENT
Start: 2022-08-16 | End: 2022-08-16

## 2022-08-16 RX ORDER — ASPIRIN/CALCIUM CARB/MAGNESIUM 324 MG
81 TABLET ORAL ONCE
Refills: 0 | Status: DISCONTINUED | OUTPATIENT
Start: 2022-08-17 | End: 2022-08-17

## 2022-08-16 RX ORDER — OXYCODONE HYDROCHLORIDE 5 MG/1
5 TABLET ORAL EVERY 6 HOURS
Refills: 0 | Status: DISCONTINUED | OUTPATIENT
Start: 2022-08-16 | End: 2022-08-18

## 2022-08-16 RX ORDER — HYDROMORPHONE HYDROCHLORIDE 2 MG/ML
0.5 INJECTION INTRAMUSCULAR; INTRAVENOUS; SUBCUTANEOUS
Refills: 0 | Status: DISCONTINUED | OUTPATIENT
Start: 2022-08-16 | End: 2022-08-16

## 2022-08-16 RX ORDER — HYDRALAZINE HCL 50 MG
5 TABLET ORAL ONCE
Refills: 0 | Status: COMPLETED | OUTPATIENT
Start: 2022-08-16 | End: 2022-08-16

## 2022-08-16 RX ORDER — FENTANYL CITRATE 50 UG/ML
50 INJECTION INTRAVENOUS
Refills: 0 | Status: DISCONTINUED | OUTPATIENT
Start: 2022-08-16 | End: 2022-08-16

## 2022-08-16 RX ORDER — HEPARIN SODIUM 5000 [USP'U]/ML
5000 INJECTION INTRAVENOUS; SUBCUTANEOUS EVERY 12 HOURS
Refills: 0 | Status: DISCONTINUED | OUTPATIENT
Start: 2022-08-17 | End: 2022-08-23

## 2022-08-16 RX ADMIN — PIPERACILLIN AND TAZOBACTAM 25 GRAM(S): 4; .5 INJECTION, POWDER, LYOPHILIZED, FOR SOLUTION INTRAVENOUS at 21:41

## 2022-08-16 RX ADMIN — PREGABALIN 1000 MICROGRAM(S): 225 CAPSULE ORAL at 10:15

## 2022-08-16 RX ADMIN — FENTANYL CITRATE 50 MICROGRAM(S): 50 INJECTION INTRAVENOUS at 12:30

## 2022-08-16 RX ADMIN — Medication 50 MILLIGRAM(S): at 21:41

## 2022-08-16 RX ADMIN — OXYCODONE HYDROCHLORIDE 5 MILLIGRAM(S): 5 TABLET ORAL at 12:15

## 2022-08-16 RX ADMIN — Medication 5 MILLIGRAM(S): at 12:39

## 2022-08-16 RX ADMIN — Medication 40 MILLIEQUIVALENT(S): at 17:50

## 2022-08-16 RX ADMIN — Medication 40 MILLIEQUIVALENT(S): at 13:48

## 2022-08-16 RX ADMIN — FENTANYL CITRATE 50 MICROGRAM(S): 50 INJECTION INTRAVENOUS at 12:21

## 2022-08-16 RX ADMIN — PIPERACILLIN AND TAZOBACTAM 25 GRAM(S): 4; .5 INJECTION, POWDER, LYOPHILIZED, FOR SOLUTION INTRAVENOUS at 06:31

## 2022-08-16 RX ADMIN — TAMSULOSIN HYDROCHLORIDE 0.4 MILLIGRAM(S): 0.4 CAPSULE ORAL at 21:41

## 2022-08-16 RX ADMIN — OXYCODONE HYDROCHLORIDE 5 MILLIGRAM(S): 5 TABLET ORAL at 12:30

## 2022-08-16 RX ADMIN — PIPERACILLIN AND TAZOBACTAM 25 GRAM(S): 4; .5 INJECTION, POWDER, LYOPHILIZED, FOR SOLUTION INTRAVENOUS at 13:48

## 2022-08-16 RX ADMIN — Medication 2: at 17:51

## 2022-08-16 NOTE — PROGRESS NOTE ADULT - ASSESSMENT
89-year-old male with DM, HTN, HLD, CAD, BPH, prostate CA s/p rad was hospitalized on 8/11/22 and diagnosed with acute cholecystitis, declined interventions and was discharged on PO abx, called back for positive blood culture (Gram variable rods), placed on Zosyn and admitted to Medicine.     Sepsis with bacteremia due to acute cholecystitis  Lactate elevated on ED visit but not now. WBC elevated on admission. Bld Cx + for gram variable rods. Repeat blood culture negative to date. HIDA 8/15 positive, suggestive of acute cholecystitis. Patient overall improved with Zosyn. Leukocytosis resolved. RUQ resolved. S/P cholecystostomy drain placement by IR today.  - C/w cholecystostomy drain, monitor output  - Continue Zosyn  - Trend labs    CAD, HTN, HLD  Elevated troponin likely demand ischemia in setting on infection. No regional wall motion abnormality on echo.   - Restarting antiplatelet tomorrow AM  - Continue beta blocker    SVT   Resolved, likely in setting of infection  - Monitor lytes  - Beta blocker    DM2  A1c 6.6  - Monitor glucose  - Continue insulin    Hypovolemic hyponatremia  Improved.

## 2022-08-16 NOTE — CHART NOTE - NSCHARTNOTEFT_GEN_A_CORE
Vascular & Interventional Radiology Pre-Procedure Note    Procedure Name: imaging guided cholecystostomy tube insertion    HPI: 89y Male with acute cholecystitis    The patient is a 89-year-old male with history of CAD, DM2, HTN, HLD, BPH,  s/p rad.Tx for porstate CA was hospitalized on 8/11/22 and diagnosed with acute cholecystitis.  The patient was a high risk for laparoscopic cholecystectomy because of his cardiac condition and cholecystostomy tube placement was offered.  The patient declined the procedure and was discharged home yesterday on Augmentin.  The patient was recalled to the emergency room because his blood culture grew gram variable rods.  The patient was started on IV Unasyn in the ER.  In the emergency room the patient developed sinus tachycardia with the elevated heart rate to 140.  Pt as admitted, and repeat HIDA was positive. IR consulted for cholecystostomy placement. Pt seen at bedside, feeling well. Denied fever, pain, SOB, NVD    PAST MEDICAL & SURGICAL HISTORY:  BPH (benign prostatic hyperplasia)  HTN (hypertension)  HLD (hyperlipidemia)  GERD (gastroesophageal reflux disease)  NSVT (nonsustained ventricular tachycardia)  Palpitations  Prostate cancer  s/p radiation  CAD S/P percutaneous coronary angioplasty  S/P cardiac catheterization  diagnostic 12/15/14      Allergies: No Known Allergies      Medications (Abx/Cardiac/Anticoagulation/Blood Products)  acetaminophen     Tablet .. 650 milliGRAM(s) Oral every 6 hours PRN  aluminum hydroxide/magnesium hydroxide/simethicone Suspension 30 milliLiter(s) Oral every 4 hours PRN  cyanocobalamin 1000 MICROGram(s) Oral daily  dextrose 5%. 1000 milliLiter(s) IV Continuous <Continuous>  dextrose 5%. 1000 milliLiter(s) IV Continuous <Continuous>  dextrose 50% Injectable 25 Gram(s) IV Push once  dextrose 50% Injectable 12.5 Gram(s) IV Push once  dextrose 50% Injectable 25 Gram(s) IV Push once  dextrose Oral Gel 15 Gram(s) Oral once PRN  glucagon  Injectable 1 milliGRAM(s) IntraMuscular once  insulin lispro (ADMELOG) corrective regimen sliding scale   SubCutaneous at bedtime  insulin lispro (ADMELOG) corrective regimen sliding scale   SubCutaneous three times a day before meals  melatonin 3 milliGRAM(s) Oral at bedtime PRN  metoprolol tartrate 50 milliGRAM(s) Oral every 12 hours  ondansetron Injectable 4 milliGRAM(s) IV Push once PRN  piperacillin/tazobactam IVPB.. 3.375 Gram(s) IV Intermittent every 8 hours  potassium chloride    Tablet ER 40 milliEquivalent(s) Oral every 4 hours  tamsulosin 0.4 milliGRAM(s) Oral at bedtime      Data:      T(C): 37.6  HR: 65  BP: 143/43  RR: 19  SpO2: 94%        -WBC 9.87 / HgB 9.0 / Hct 28.2 / Plt 220  -Na 137 / Cl 108 / BUN 9 / Glucose 116  -K 3.3 / CO2 26 / Cr 0.66  -ALT 30 / Alk Phos 80 / T.Bili 0.4  -INR1.15      Imaging: distended GB with + HIDA    Plan:   -89y Male presents for cholecystostomy tube insertion  -Risks/Benefits/alternatives explained with the patient's daughter (healthcare proxy) via telephone and witnessed informed telephone consent obtained.

## 2022-08-16 NOTE — PROGRESS NOTE ADULT - SUBJECTIVE AND OBJECTIVE BOX
Chief Complaint: Called back for positive blood cultures    Interval Hx: Patient seen and examined. Feels improved since admission. No longer with RUQ tenderness. Leukocytosis resolved. Rpt Bld cx with no growth to date. Underwent cholecystostomy drain placement today for ultimate source control.    ROS: All other systems reviewed and found to be negative with the exception of some physical deconditioning    Vitals:  T(F): 98 (16 Aug 2022 14:00), Max: 100.3 (15 Aug 2022 23:34)  HR: 64 (16 Aug 2022 14:00) (59 - 70)  BP: 132/42 (16 Aug 2022 14:00) (122/74 - 178/57)  RR: 18 (16 Aug 2022 14:00) (15 - 24)  SpO2: 95% (16 Aug 2022 14:00) (91% - 100%) 2L/min    Exam:  Gen: No acute distress  HEENT: NCAT PERRL EOMI MMM  Neck: Supple, no LAD, no JVD  CVS:  S1 S2 normal, RRR  Chest: Normal resp effort, lungs CTA b/L  Abd: +BS, non distended, soft, minimally tender to deep palpation in RUQ, no guarding or rebound, interval placement of RUQ cholecystostomy drain  Ext: No edema or calf tenderness  Skin: Warm, dry  Neuro: A+OX3, no deficits    Labs:                              9.0    9.87  )-------( 220             28.2       137  |  108  |  9   ----------------------<  116  3.3   |  26  |  0.66    Ca 7.5 (WNL when corrected for albumin)        TPro  5.3  /  Alb  2.0  /  TBili  0.4  /  DBili  0.2  /  AST  24  /  ALT  30  /  AlkPhos  80      Urinalysis 8/13: Clear, N-, LE-, RBC 3-5, WBC 0-2, bact occ    Micro:  Urine culture 8/13: Negative  Blood culture 8/13: Negative  Blood culture 8/13: Negative  Urine culture 8/12: Negative  Blood culture 8/11: Gram variable rods  Blood culture 8/11: Negative    Imaging:  NM HIDA scan 8/15: Prompt, homogeneous uptake of radiotracer by the hepatocytes. Activity is first seen in the bowel at 20 minutes. The gallbladder is not seen at any time during the study. There is good clearance of activity from the liver by the end of the study. Abnormal hepatobiliary scan. In the proper clinical setting, findings are consistent with acute cholecystitis.    Cardiac Testing:  (Prior admission)  Echo 8/12/22: The left ventricle is normal in size, wall motion, and contractility. Mild concentric left ventricular hypertrophy is present. Estimated left ventricular ejection fraction is 60-65 %. Normal appearing right ventricle structure and function. Mild aortic sclerosis is present with normal valvular opening. Mild to moderate aortic regurgitation is present. Mild mitral annular calcification is present. The mitral valve leaflets appear mildly thickened. Mild (1+) mitral regurgitation is present. The tricuspid valve leaflets are thin and pliable; valve motion is normal. Moderate (2+) eccentric tricuspid valve regurgitation is present. Moderate to severe pulmonary hypertension. Normal appearing pulmonic valve structure. Mild pulmonic valvular regurgitation (1+) is present. No evidence of pericardial effusion.    Procedures:  Cholecystostomy tube placement 8/16/22 by IR    Meds:  MEDICATIONS  (STANDING):  cyanocobalamin 1000 MICROGram(s) Oral daily  insulin lispro (ADMELOG) corrective regimen sliding scale   SubCutaneous at bedtime  insulin lispro (ADMELOG) corrective regimen sliding scale   SubCutaneous three times a day before meals  metoprolol tartrate 50 milliGRAM(s) Oral every 12 hours  piperacillin/tazobactam IVPB.. 3.375 Gram(s) IV Intermittent every 8 hours  tamsulosin 0.4 milliGRAM(s) Oral at bedtime    MEDICATIONS  (PRN):  acetaminophen     Tablet .. 650 milliGRAM(s) Oral every 6 hours PRN Temp greater or equal to 38C (100.4F), Mild Pain (1 - 3)  aluminum hydroxide/magnesium hydroxide/simethicone Suspension 30 milliLiter(s) Oral every 4 hours PRN Dyspepsia  dextrose Oral Gel 15 Gram(s) Oral once PRN Blood Glucose LESS THAN 70 milliGRAM(s)/deciliter  HYDROmorphone  Injectable 0.5 milliGRAM(s) IV Push every 3 hours PRN Severe Pain (7 - 10)  melatonin 3 milliGRAM(s) Oral at bedtime PRN Insomnia  ondansetron Injectable 4 milliGRAM(s) IV Push once PRN Nausea and/or Vomiting  oxyCODONE    IR 5 milliGRAM(s) Oral every 6 hours PRN Moderate Pain (4 - 6)

## 2022-08-16 NOTE — BRIEF OPERATIVE NOTE - OPERATION/FINDINGS
10 fr pigtail catheter in GB with US and fluoro guidance after 2 punctures.  Dark Greenish Brown bile drained. Initial puncture didn't track into GB and removed.

## 2022-08-17 DIAGNOSIS — Z79.899 OTHER LONG TERM (CURRENT) DRUG THERAPY: ICD-10-CM

## 2022-08-17 DIAGNOSIS — Z95.5 PRESENCE OF CORONARY ANGIOPLASTY IMPLANT AND GRAFT: ICD-10-CM

## 2022-08-17 DIAGNOSIS — K81.0 ACUTE CHOLECYSTITIS: ICD-10-CM

## 2022-08-17 DIAGNOSIS — E78.5 HYPERLIPIDEMIA, UNSPECIFIED: ICD-10-CM

## 2022-08-17 DIAGNOSIS — K21.9 GASTRO-ESOPHAGEAL REFLUX DISEASE WITHOUT ESOPHAGITIS: ICD-10-CM

## 2022-08-17 DIAGNOSIS — Z79.82 LONG TERM (CURRENT) USE OF ASPIRIN: ICD-10-CM

## 2022-08-17 DIAGNOSIS — I25.2 OLD MYOCARDIAL INFARCTION: ICD-10-CM

## 2022-08-17 DIAGNOSIS — I25.10 ATHEROSCLEROTIC HEART DISEASE OF NATIVE CORONARY ARTERY WITHOUT ANGINA PECTORIS: ICD-10-CM

## 2022-08-17 DIAGNOSIS — Z79.02 LONG TERM (CURRENT) USE OF ANTITHROMBOTICS/ANTIPLATELETS: ICD-10-CM

## 2022-08-17 DIAGNOSIS — D17.79 BENIGN LIPOMATOUS NEOPLASM OF OTHER SITES: ICD-10-CM

## 2022-08-17 DIAGNOSIS — Z87.891 PERSONAL HISTORY OF NICOTINE DEPENDENCE: ICD-10-CM

## 2022-08-17 DIAGNOSIS — I27.20 PULMONARY HYPERTENSION, UNSPECIFIED: ICD-10-CM

## 2022-08-17 DIAGNOSIS — Z82.49 FAMILY HISTORY OF ISCHEMIC HEART DISEASE AND OTHER DISEASES OF THE CIRCULATORY SYSTEM: ICD-10-CM

## 2022-08-17 DIAGNOSIS — E11.9 TYPE 2 DIABETES MELLITUS WITHOUT COMPLICATIONS: ICD-10-CM

## 2022-08-17 DIAGNOSIS — Z53.20 PROCEDURE AND TREATMENT NOT CARRIED OUT BECAUSE OF PATIENT'S DECISION FOR UNSPECIFIED REASONS: ICD-10-CM

## 2022-08-17 DIAGNOSIS — I10 ESSENTIAL (PRIMARY) HYPERTENSION: ICD-10-CM

## 2022-08-17 LAB
ADD ON TEST-SPECIMEN IN LAB: SIGNIFICANT CHANGE UP
ALBUMIN SERPL ELPH-MCNC: 2 G/DL — LOW (ref 3.3–5)
ALP SERPL-CCNC: 81 U/L — SIGNIFICANT CHANGE UP (ref 40–120)
ALT FLD-CCNC: 29 U/L — SIGNIFICANT CHANGE UP (ref 12–78)
ANION GAP SERPL CALC-SCNC: 7 MMOL/L — SIGNIFICANT CHANGE UP (ref 5–17)
AST SERPL-CCNC: 25 U/L — SIGNIFICANT CHANGE UP (ref 15–37)
BILIRUB DIRECT SERPL-MCNC: 0.1 MG/DL — SIGNIFICANT CHANGE UP (ref 0–0.3)
BILIRUB INDIRECT FLD-MCNC: 0.2 MG/DL — SIGNIFICANT CHANGE UP (ref 0.2–1)
BILIRUB SERPL-MCNC: 0.3 MG/DL — SIGNIFICANT CHANGE UP (ref 0.2–1.2)
BUN SERPL-MCNC: 5 MG/DL — LOW (ref 7–23)
CALCIUM SERPL-MCNC: 7.6 MG/DL — LOW (ref 8.5–10.1)
CHLORIDE SERPL-SCNC: 108 MMOL/L — SIGNIFICANT CHANGE UP (ref 96–108)
CO2 SERPL-SCNC: 23 MMOL/L — SIGNIFICANT CHANGE UP (ref 22–31)
CREAT SERPL-MCNC: 0.43 MG/DL — LOW (ref 0.5–1.3)
CULTURE RESULTS: SIGNIFICANT CHANGE UP
EGFR: 101 ML/MIN/1.73M2 — SIGNIFICANT CHANGE UP
GLUCOSE SERPL-MCNC: 119 MG/DL — HIGH (ref 70–99)
HCT VFR BLD CALC: 27.5 % — LOW (ref 39–50)
HGB BLD-MCNC: 9.2 G/DL — LOW (ref 13–17)
MCHC RBC-ENTMCNC: 29 PG — SIGNIFICANT CHANGE UP (ref 27–34)
MCHC RBC-ENTMCNC: 33.5 GM/DL — SIGNIFICANT CHANGE UP (ref 32–36)
MCV RBC AUTO: 86.8 FL — SIGNIFICANT CHANGE UP (ref 80–100)
PLATELET # BLD AUTO: 239 K/UL — SIGNIFICANT CHANGE UP (ref 150–400)
POTASSIUM SERPL-MCNC: 3.7 MMOL/L — SIGNIFICANT CHANGE UP (ref 3.5–5.3)
POTASSIUM SERPL-SCNC: 3.7 MMOL/L — SIGNIFICANT CHANGE UP (ref 3.5–5.3)
PROT SERPL-MCNC: 5.4 GM/DL — LOW (ref 6–8.3)
RBC # BLD: 3.17 M/UL — LOW (ref 4.2–5.8)
RBC # FLD: 13.5 % — SIGNIFICANT CHANGE UP (ref 10.3–14.5)
SODIUM SERPL-SCNC: 138 MMOL/L — SIGNIFICANT CHANGE UP (ref 135–145)
SPECIMEN SOURCE: SIGNIFICANT CHANGE UP
WBC # BLD: 7.31 K/UL — SIGNIFICANT CHANGE UP (ref 3.8–10.5)
WBC # FLD AUTO: 7.31 K/UL — SIGNIFICANT CHANGE UP (ref 3.8–10.5)

## 2022-08-17 PROCEDURE — 99233 SBSQ HOSP IP/OBS HIGH 50: CPT

## 2022-08-17 PROCEDURE — 93010 ELECTROCARDIOGRAM REPORT: CPT

## 2022-08-17 PROCEDURE — 99222 1ST HOSP IP/OBS MODERATE 55: CPT

## 2022-08-17 RX ORDER — METOPROLOL TARTRATE 50 MG
50 TABLET ORAL EVERY 12 HOURS
Refills: 0 | Status: DISCONTINUED | OUTPATIENT
Start: 2022-08-17 | End: 2022-08-18

## 2022-08-17 RX ORDER — CLOPIDOGREL BISULFATE 75 MG/1
75 TABLET, FILM COATED ORAL DAILY
Refills: 0 | Status: DISCONTINUED | OUTPATIENT
Start: 2022-08-17 | End: 2022-08-23

## 2022-08-17 RX ORDER — CLOPIDOGREL BISULFATE 75 MG/1
1 TABLET, FILM COATED ORAL
Qty: 0 | Refills: 0 | DISCHARGE

## 2022-08-17 RX ORDER — SODIUM CHLORIDE 9 MG/ML
500 INJECTION, SOLUTION INTRAVENOUS ONCE
Refills: 0 | Status: DISCONTINUED | OUTPATIENT
Start: 2022-08-17 | End: 2022-08-17

## 2022-08-17 RX ORDER — DILTIAZEM HCL 120 MG
30 CAPSULE, EXT RELEASE 24 HR ORAL EVERY 6 HOURS
Refills: 0 | Status: DISCONTINUED | OUTPATIENT
Start: 2022-08-17 | End: 2022-08-17

## 2022-08-17 RX ORDER — DILTIAZEM HCL 120 MG
5 CAPSULE, EXT RELEASE 24 HR ORAL
Qty: 125 | Refills: 0 | Status: DISCONTINUED | OUTPATIENT
Start: 2022-08-17 | End: 2022-08-21

## 2022-08-17 RX ORDER — POTASSIUM PHOSPHATE, MONOBASIC POTASSIUM PHOSPHATE, DIBASIC 236; 224 MG/ML; MG/ML
15 INJECTION, SOLUTION INTRAVENOUS ONCE
Refills: 0 | Status: COMPLETED | OUTPATIENT
Start: 2022-08-17 | End: 2022-08-17

## 2022-08-17 RX ORDER — POLYETHYLENE GLYCOL 3350 17 G/17G
17 POWDER, FOR SOLUTION ORAL DAILY
Refills: 0 | Status: DISCONTINUED | OUTPATIENT
Start: 2022-08-17 | End: 2022-08-23

## 2022-08-17 RX ORDER — POTASSIUM CHLORIDE 20 MEQ
40 PACKET (EA) ORAL ONCE
Refills: 0 | Status: COMPLETED | OUTPATIENT
Start: 2022-08-17 | End: 2022-08-17

## 2022-08-17 RX ORDER — MAGNESIUM SULFATE 500 MG/ML
1 VIAL (ML) INJECTION ONCE
Refills: 0 | Status: COMPLETED | OUTPATIENT
Start: 2022-08-17 | End: 2022-08-17

## 2022-08-17 RX ORDER — METFORMIN HYDROCHLORIDE 850 MG/1
0 TABLET ORAL
Qty: 0 | Refills: 0 | DISCHARGE

## 2022-08-17 RX ORDER — SENNA PLUS 8.6 MG/1
2 TABLET ORAL AT BEDTIME
Refills: 0 | Status: DISCONTINUED | OUTPATIENT
Start: 2022-08-17 | End: 2022-08-23

## 2022-08-17 RX ORDER — DILTIAZEM HCL 120 MG
5 CAPSULE, EXT RELEASE 24 HR ORAL
Qty: 125 | Refills: 0 | Status: DISCONTINUED | OUTPATIENT
Start: 2022-08-17 | End: 2022-08-17

## 2022-08-17 RX ADMIN — Medication 5 MG/HR: at 14:37

## 2022-08-17 RX ADMIN — Medication 100 GRAM(S): at 12:54

## 2022-08-17 RX ADMIN — Medication 5 MG/HR: at 19:24

## 2022-08-17 RX ADMIN — HEPARIN SODIUM 5000 UNIT(S): 5000 INJECTION INTRAVENOUS; SUBCUTANEOUS at 21:44

## 2022-08-17 RX ADMIN — PIPERACILLIN AND TAZOBACTAM 25 GRAM(S): 4; .5 INJECTION, POWDER, LYOPHILIZED, FOR SOLUTION INTRAVENOUS at 06:28

## 2022-08-17 RX ADMIN — PIPERACILLIN AND TAZOBACTAM 25 GRAM(S): 4; .5 INJECTION, POWDER, LYOPHILIZED, FOR SOLUTION INTRAVENOUS at 14:43

## 2022-08-17 RX ADMIN — POTASSIUM PHOSPHATE, MONOBASIC POTASSIUM PHOSPHATE, DIBASIC 62.5 MILLIMOLE(S): 236; 224 INJECTION, SOLUTION INTRAVENOUS at 16:22

## 2022-08-17 RX ADMIN — PIPERACILLIN AND TAZOBACTAM 25 GRAM(S): 4; .5 INJECTION, POWDER, LYOPHILIZED, FOR SOLUTION INTRAVENOUS at 21:45

## 2022-08-17 RX ADMIN — TAMSULOSIN HYDROCHLORIDE 0.4 MILLIGRAM(S): 0.4 CAPSULE ORAL at 21:35

## 2022-08-17 RX ADMIN — Medication 1: at 12:49

## 2022-08-17 RX ADMIN — PREGABALIN 1000 MICROGRAM(S): 225 CAPSULE ORAL at 09:16

## 2022-08-17 RX ADMIN — CLOPIDOGREL BISULFATE 75 MILLIGRAM(S): 75 TABLET, FILM COATED ORAL at 09:16

## 2022-08-17 RX ADMIN — Medication 50 MILLIGRAM(S): at 21:35

## 2022-08-17 RX ADMIN — HEPARIN SODIUM 5000 UNIT(S): 5000 INJECTION INTRAVENOUS; SUBCUTANEOUS at 09:16

## 2022-08-17 RX ADMIN — Medication 50 MILLIGRAM(S): at 09:16

## 2022-08-17 RX ADMIN — Medication 40 MILLIEQUIVALENT(S): at 09:53

## 2022-08-17 NOTE — CONSULT NOTE ADULT - CONSULT REASON
R/O cholecystitis
89yo M with positive HIDA scan, referred to IR for cholecystostomy placement
SVT
acute cholecystitis  bacteremia
SVT and Pre-op

## 2022-08-17 NOTE — CONSULT NOTE ADULT - ASSESSMENT
89 yr old male with above PMHx presented with acute cholecystitis on 8/11/22.  He was considered high risk for lap lor and declined to have cholecystostomy tube at that time so he was discharged home on Augmentin PO.  His blood cultures then came back positive for gram variable rods and he was called back to the ED to start IV antibiotics and admitted.  He has been having bursts of SVT but asymptomatic.  Started on metoprolol PO BID.  On 8/16 he had guided percutaneous cholecystostomy tube placed.  2D echo reveals normal LV function EF 60-65%.  IV Cardizem drip started today at 5 mg/hr, converted to NSR briefly but reverted back to tachy 120-130 bpm.    A/P: Sustained SVT in setting of bacteremia  Continue tele monitoring  Continue metoprolol 50 PO BID  Add cardizem drip, titrate down to HR ~100, monitor SBP, hold <100mmHG, HR <55bpm  Normal LV function  Keep lytes repleted, K>4, Mg>2  Keep pt hydrated  IV antibiotics per ID  Discussed with Dr. Julian, hospitalist MD   89 yr old male with above PMHx presented with acute cholecystitis on 8/11/22.  He was considered high risk for lap lor and declined to have cholecystostomy tube at that time so he was discharged home on Augmentin PO.  His blood cultures then came back positive for gram variable rods and he was called back to the ED to start IV antibiotics and admitted.  He has been having bursts of SVT but asymptomatic.  Started on metoprolol PO BID.  On 8/16 he had guided percutaneous cholecystostomy tube placed.  2D echo reveals normal LV function EF 60-65%.  IV Cardizem drip started today at 5 mg/hr, converted to NSR briefly but reverted back to tachy 120-130 bpm.    A/P: Sustained SVT in setting of bacteremia  Continue tele monitoring  Continue metoprolol 50 PO BID  Add cardizem drip, titrate down to HR ~100, monitor SBP, hold <100mmHG, HR <55bpm  Normal LV function  Keep lytes repleted, K>4, Mg>2  Keep pt hydrated  IV antibiotics per ID  As per cardiology pt should have ischemic work-up when stable.  Discussed with Dr. Julian, hospitalist MD   89 yr old male with above PMHx presented with acute cholecystitis on 8/11/22.  He was considered high risk for lap lor and declined to have cholecystostomy tube at that time so he was discharged home on Augmentin PO.  His blood cultures then came back positive for gram variable rods and he was called back to the ED to start IV antibiotics and admitted.  He has been having bursts of SVT but asymptomatic.  Started on metoprolol PO BID.  On 8/16 he had guided percutaneous cholecystostomy tube placed.  2D echo reveals normal LV function EF 60-65%.  IV Cardizem drip started today at 5 mg/hr, converted to NSR briefly but reverted back to tachy 120-130 bpm.    A/P: Sustained SVT multiple episodes, avg HR 130s bpm while on metoprolol in setting of bacteremia acute cholecystitis   Continue tele monitoring  Continue metoprolol 50 PO BID  Add cardizem drip, titrate down to HR ~100, monitor SBP, hold <100mmHG, HR <55bpm  Normal LV function  Keep lytes repleted, K>4, Mg>2  Keep pt hydrated  IV antibiotics per ID  As per cardiology pt should have ischemic work-up when stable.  Discussed with Dr. Julian, hospitalist MD

## 2022-08-17 NOTE — PROVIDER CONTACT NOTE (CHANGE IN STATUS NOTIFICATION) - SITUATION
pt complaining of heart palpitations with HR: 147, MEWS of 8  EKG showed SVT pt complaining of palpitations with HR: 147  MEWS of 8- primary MD aware  EKG showed SVT- RRT called

## 2022-08-17 NOTE — CONSULT NOTE ADULT - NS ATTEND AMEND GEN_ALL_CORE FT
Sustained SVT multiple episodes, avg HR 130s bpm while on metoprolol in setting of bacteremia acute cholecystitis   pt remains in SR while on cardizem and metoprolol to be continued PO  discussed with patient who has good functional status that if SVT continues to recure with RVR despite 2 av node blockers may benefit from EPS and SVT ablation, consider Zio on discharge and follow with EP

## 2022-08-17 NOTE — CONSULT NOTE ADULT - REASON FOR ADMISSION
Bacteremia, cholecystitis, tachycardia

## 2022-08-17 NOTE — CONSULT NOTE ADULT - SUBJECTIVE AND OBJECTIVE BOX
CHIEF COMPLAINT:  Presents because of +BCx from recent hospitalization    HPI: The patient is a 89-year-old male with history of CAD, DM2, HTN, HLD, BPH, prostate CA s/p rad was hospitalized on 22 and diagnosed with acute cholecystitis. He was recommended cholecystostomy because of his elevated cardiac risk and he refused. He was very upset about being made NPO as refused all treatments and wanted to go home. He was discharged home yesterday on Augmentin.  The patient was recalled to the emergency room because his blood culture grew gram variable rods.  The patient was started on IV Unasyn in the ER.  In the emergency room the patient developed sinus tachycardia/SVT with the elevated heart rate to 140. He was given coreg and the above antibiotics and SVT resolved.    22: Patient upset that he got coreg. He states he does not tolerate coreg because it causes him dizziness and only wants metoprolol. He also continues to refuse any procedures and just wanted to come to the hospital for the IV antibiotics. He is upset again that he is NPO.    He denies any fevers, chils, CP, palp, SOB, current abd pain, N/V, dizziness, syncope, orthopnea, PND    PMHx:  PAST MEDICAL & SURGICAL HISTORY:  BPH (benign prostatic hyperplasia)  HLD (hyperlipidemia)  GERD (gastroesophageal reflux disease)  Prostate cancer s/p radiation  CAD S/P percutaneous coronary angioplasty 2014  DM2  Pulm HTN    Social History:  Former smoker  Denies any significant etoh or any illicit drug use    FAMILY HISTORY:   FAMILY HISTORY:  Family history of MI (myocardial infarction) (Father)  Family history of uterine cancer (Mother)    ALLERGIES:  Allergies  No Known Allergies    REVIEW OF SYSTEMS:  10 system ROS was obtained, all pertinent positives and negatives are in HPI otherwise they were negative.    Vital Signs Last 24 Hrs  T(C): 37.2 (14 Aug 2022 09:21), Max: 37.8 (13 Aug 2022 14:12)  T(F): 98.9 (14 Aug 2022 09:21), Max: 100 (13 Aug 2022 14:12)  HR: 125 (14 Aug 2022 09:21) (70 - 140)  BP: 125/72 (14 Aug 2022 09:21) (107/56 - 172/66)  BP(mean): 96 (13 Aug 2022 20:30) (83 - 96)  RR: 19 (14 Aug 2022 09:21) (18 - 24)  SpO2: 94% (14 Aug 2022 09:21) (93% - 96%)    Parameters below as of 14 Aug 2022 09:21  Patient On (Oxygen Delivery Method): room air    CAPILLARY BLOOD GLUCOSE  POCT Blood Glucose.: 107 mg/dL (14 Aug 2022 11:22)  POCT Blood Glucose.: 101 mg/dL (14 Aug 2022 08:00)  POCT Blood Glucose.: 122 mg/dL (13 Aug 2022 23:12)  POCT Blood Glucose.: 206 mg/dL (13 Aug 2022 19:17)      PHYSICAL EXAM:   Constitutional: awake and alert in NAD  HEENT: EOMI, Normal Hearing, MMM  Neck: Soft and supple, No LAD, mild + JVD, no carotid bruit  Respiratory: Breath sounds are clear bilaterally, No wheezing, rales or rhonchi, good air movement  Cardiovascular: S1 and S2, regular rate and rhythm, soft systolic murmur at LSB. PMI is nondisplaced  Gastrointestinal: Bowel Sounds present, soft, nontender, nondistended, no guarding, no rebound  Extremities: Warm and well perfused, no peripheral edema  Neurological: A/O x 3, no focal deficits appreciated  Skin: No rashes appreciated    MEDICATIONS:  MEDICATIONS  (STANDING):  cyanocobalamin 1000 MICROGram(s) Oral daily  dextrose 5%. 1000 milliLiter(s) (50 mL/Hr) IV Continuous <Continuous>  dextrose 5%. 1000 milliLiter(s) (100 mL/Hr) IV Continuous <Continuous>  dextrose 50% Injectable 25 Gram(s) IV Push once  dextrose 50% Injectable 12.5 Gram(s) IV Push once  dextrose 50% Injectable 25 Gram(s) IV Push once  glucagon  Injectable 1 milliGRAM(s) IntraMuscular once  heparin   Injectable 5000 Unit(s) SubCutaneous every 12 hours  insulin lispro (ADMELOG) corrective regimen sliding scale   SubCutaneous at bedtime  insulin lispro (ADMELOG) corrective regimen sliding scale   SubCutaneous three times a day before meals  metoprolol tartrate 25 milliGRAM(s) Oral two times a day  piperacillin/tazobactam IVPB.. 3.375 Gram(s) IV Intermittent every 8 hours  sodium chloride 0.9%. 500 milliLiter(s) (75 mL/Hr) IV Continuous <Continuous>      LABS: All Labs Reviewed:                        9.8    14.39 )-----------( 188      ( 14 Aug 2022 04:55 )             29.2         132<L>  |  106  |  12  ----------------------------<  97  4.0   |  21<L>  |  0.54    Ca    7.9<L>      14 Aug 2022 04:55    TPro  5.5<L>  /  Alb  2.2<L>  /  TBili  0.3  /  DBili  <0.1  /  AST  36  /  ALT  28  /  AlkPhos  75      BLOOD CULTURES: Organism --  Gram Stain Blood -- Gram Stain --  Specimen Source Clean Catch None  Culture-Blood --    Organism Blood Culture PCR  Gram Stain Blood -- Gram Stain   Growth in anaerobic bottle: Gram Variable Rods  Specimen Source .Blood None  Culture-Blood --    RADIOLOGY:    Reviewed in EMR    EK/13/22, my interpretation: NSR low voltage nonspecific ST changes     TELEMETRY:    Reviewed. Was in SVT but broke and now in sinus    ECHO:    ACC: [id number] EXAM:  ECHO TTE WO CON COMP W DOPP                        PROCEDURE DATE:  2022      Indications   1) Z01.810 Encounter for preprocedural cardiovascular examination    M-Mode Measurements (cm)     LVEDd: 4.8 cm          LVESd: 3.02 cm   IVSEd: 1.23 cm   LVPWd: 1.13 cm            AO Root Dimension: 3.5 cm                             ACS: 1.9 cm                             LA: 3.8 cm    Doppler Measurements:     AV Velocity:146 cm/s               MV Peak E-Wave: 121 cm/s   AV Peak Gradient: 8.53 mmHg        MV Peak A-Wave: 122 cm/s                                      MV E/A Ratio: 0.99 %   TR Velocity:383 cm/s               MV Peak Gradient: 5.86 mmHg   TR Gradient:58.6756 mmHg   Estimated RAP:8 mmHg   RVSP:67 mmHg     Summary     The left ventricle is normal in size, wall motion, and contractility.   Mild concentric left ventricular hypertrophy is present.   Estimated left ventricular ejection fraction is 60-65 %.   Normal appearing right ventricle structure and function.   Mild aortic sclerosis is present with normal valvular opening.   Mild to moderate aortic regurgitation is present.   Mild mitral annular calcification is present.   The mitral valve leaflets appear mildly thickened.   Mild (1+) mitral regurgitation is present.   The tricuspid valve leaflets are thin and pliable; valve motion is normal.   Moderate (2+) eccentric tricuspid valve regurgitation is present.   Moderate to severe pulmonary hypertension.   Normal appearing pulmonic valve structure.   Mild pulmonic valvular regurgitation (1+) is present.   No evidence of pericardial effusion.
Patient is a 89y old  Male who presents with a chief complaint of Bacteremia, cholecystitis, tachycardia (13 Aug 2022 19:27)    HPI:  89-year-old male with history of CAD, DM2, HTN, HLD, BPH,  s/p rad.Tx for prostate CA was hospitalized on 22 and diagnosed with acute cholecystitis.  The patient was a high risk for laparoscopic cholecystectomy because of his cardiac condition and cholecystostomy tube placement was offered.  The patient declined the procedure and was discharged home day prior to admit on Augmentin.  The patient was recalled to the emergency room because his blood culture grew gram variable rods.  The patient was started on IV Unasyn in the ER.  In the emergency room the patient developed sinus tachycardia with the elevated heart rate to 140.  Patient denies chest pain or abdominal pain nausea vomiting.     PMHx: CAD,DM2, HTN, HLD, BPH,  s/p rad.Tx for prostate     PSHx: PCI  stents x 3 at  by Dr. Sharma   approx. for positive stress test. Had  radiation  at [facility] in [place] for  Prostate CA in .  Meds: per reconciliation sheet, noted below  MEDICATIONS  (STANDING):  cyanocobalamin 1000 MICROGram(s) Oral daily  dextrose 5%. 1000 milliLiter(s) (50 mL/Hr) IV Continuous <Continuous>  dextrose 5%. 1000 milliLiter(s) (100 mL/Hr) IV Continuous <Continuous>  dextrose 50% Injectable 25 Gram(s) IV Push once  dextrose 50% Injectable 12.5 Gram(s) IV Push once  dextrose 50% Injectable 25 Gram(s) IV Push once  glucagon  Injectable 1 milliGRAM(s) IntraMuscular once  heparin   Injectable 5000 Unit(s) SubCutaneous every 12 hours  insulin lispro (ADMELOG) corrective regimen sliding scale   SubCutaneous at bedtime  insulin lispro (ADMELOG) corrective regimen sliding scale   SubCutaneous three times a day before meals  metoprolol tartrate 25 milliGRAM(s) Oral two times a day  piperacillin/tazobactam IVPB.. 3.375 Gram(s) IV Intermittent every 8 hours  sodium chloride 0.9%. 500 milliLiter(s) (75 mL/Hr) IV Continuous <Continuous>    Allergies    No Known Allergies    Intolerances      Social: no smoking, no alcohol, no illegal drugs; no recent travel, no exposure to TB  FAMILY HISTORY:  Family history of MI (myocardial infarction) (Father)    Family history of uterine cancer (Mother)       no history of premature cardiovascular disease in first degree relatives    ROS: the patient denies fever, no chills, no HA, no dizziness, no sore throat, no blurry vision, no CP, no palpitations, no SOB, no cough, abdominal pain, no diarrhea, no N/V, no dysuria, no leg pain, no claudication, no rash, no joint aches, no rectal pain or bleeding, no night sweats    All other systems reviewed and are negative    Vital Signs Last 24 Hrs  T(C): 37.2 (14 Aug 2022 09:21), Max: 37.8 (13 Aug 2022 14:12)  T(F): 98.9 (14 Aug 2022 09:21), Max: 100 (13 Aug 2022 14:12)  HR: 125 (14 Aug 2022 09:21) (70 - 140)  BP: 125/72 (14 Aug 2022 09:21) (107/56 - 172/66)  BP(mean): 96 (13 Aug 2022 20:30) (83 - 96)  RR: 19 (14 Aug 2022 09:21) (18 - 24)  SpO2: 94% (14 Aug 2022 09:21) (93% - 96%)    Parameters below as of 14 Aug 2022 09:21  Patient On (Oxygen Delivery Method): room air      Daily Height in cm: 167.64 (13 Aug 2022 14:08)    Daily     PE:  Constitutional: frail looking  HEENT: NC/AT, EOMI, PERRLA, conjunctivae clear; ears and nose atraumatic; pharynx benign  Neck: supple; thyroid not palpable  Back: no tenderness  Respiratory: respiratory effort normal; clear to auscultation  Cardiovascular: S1S2 regular, no murmurs  Abdomen: soft,  tender, not distended, positive BS; liver and spleen WNL  Genitourinary: no suprapubic tenderness  Lymphatic: no LN palpable  Musculoskeletal: no muscle tenderness, no joint swelling or tenderness  Extremities: no pedal edema  Neurological/ Psychiatric: AxOx3, Judgement and insight normal;  moving all extremities  Skin: no rashes; no palpable lesions    Labs: all available labs reviewed                        9.8    14.39 )-----------( 188      ( 14 Aug 2022 04:55 )             29.2     08-14    132<L>  |  106  |  12  ----------------------------<  97  4.0   |  21<L>  |  0.54    Ca    7.9<L>      14 Aug 2022 04:55    TPro  5.5<L>  /  Alb  2.2<L>  /  TBili  0.3  /  DBili  <0.1  /  AST  36  /  ALT  28  /  AlkPhos  75  08-14     LIVER FUNCTIONS - ( 14 Aug 2022 04:55 )  Alb: 2.2 g/dL / Pro: 5.5 gm/dL / ALK PHOS: 75 U/L / ALT: 28 U/L / AST: 36 U/L / GGT: x           Urinalysis Basic - ( 13 Aug 2022 16:26 )    Color: Yellow / Appearance: Clear / S.020 / pH: x  Gluc: x / Ketone: Moderate  / Bili: Negative / Urobili: Negative   Blood: x / Protein: 100 / Nitrite: Negative   Leuk Esterase: Negative / RBC: 3-5 /HPF / WBC 0-2   Sq Epi: x / Non Sq Epi: Negative / Bacteria: Occasional    Culture - Urine (22 @ 01:00)   Specimen Source: Clean Catch None   Culture Results:   No growth Culture - Blood (22 @ 23:04)   Specimen Source: .Blood None   Culture Results:   No growth to date. Culture - Blood (22 @ 23:04)   Gram Stain:   Growth in anaerobic bottle: Gram Variable Rods   - Blood PCR Panel: NEG       Radiology: all available radiological tests reviewed    Advanced directives addressed: full resuscitation
89 y/o M with PMHx of CAD, GERD, Prostate cancer, HLD, HTN, VPH, s/p stent recently on Macrobid for UTI, admitted AUG 11-12 regarding RUQ pain suspected of an acute cholecystitis with elevated white count and abnormal CT. Pt was admitted to surgical service by Dr. Murray. HIDA scan was imcomplete since pt refuse to continue study. Pt is a poor candidate for lap lor due to pulmonary HTN. Pt refused cholecystostomy tube and was discharged yesterday afternoon on antibiotic. He received call from surgery office of blood culture for abnormal results; in anaerobic referred to ED for further evaluation. Pt denies nausea, vomiting, abdominal pain.    Vitals:  T(C): 37.8 (- @ 14:12), Max: 37.8 (- @ 14:12)  HR: 83 (- @ 14:12) (83 - 83)  BP: 156/69 (08-13 @ 14:12) (156/69 - 156/69)  RR: 20 ( @ 14:12) (20 - 20)  SpO2: 93% (- @ 14:12) (93% - 93%)      Physical Exam:  General: AAOx3, elderly male well developed, NAD  Chest: Normal respiratory effort  Heart: RRR  Abdomen: Soft, NTND, no masses  Neuro/Psych: No localized deficits. Normal speech, normal tone  Skin: Normal, no rashes, no lesions noted.   Extremities: Warm, well perfused, no edema, Pulses intact    08- @ 15:20                    11.2  CBC: 17.45>)-------(<206                     33.6                 134 | 104 | 10    CMP:  ----------------------< 128               3.4 | 22 | 0.70                      Ca:8.5  Phos:-  Mg:-               0.5|      |43        LFTs:  ------|67|-----             -|      |-  08-12 @ 07:49                    10.7  CBC: 19.35>)-------(<221                     32.8                 134 | 103 | 10    CMP:  ----------------------< 174               3.5 | 24 | 0.73                      Ca:8.4  Phos:2.9  M.7               0.3|      |40        LFTs:  ------|64|-----             -|      |-      Culture - Urine (collected 22 @ 01:00)  Source: Clean Catch None  Final Report (22 @ 09:36):    No growth    Culture - Blood (collected 22 @ 23:04)  Source: .Blood None  Preliminary Report (22 @ 05:00):    No growth to date.    Culture - Blood (collected 22 @ 23:04)  Source: .Blood None  Gram Stain (22 @ 19:54):    Growth in anaerobic bottle: Gram Variable Rods  Preliminary Report (22 @ 19:54):    Growth in anaerobic bottle: Gram Variable Rods    ***Blood Panel PCR results on this specimen are available    approximately 3 hours after the Gram stain result.***    Gram stain, PCR, and/or culture results may not always    correspond due to difference in methodologies.    ************************************************************    This PCR assay was performed by multiplex PCR. This    Assay tests for 66 bacterial and resistance gene targets.    Please refer to the [facility] test directory    at [email or web address] for details.  Organism: Blood Culture PCR (22 @ 23:20)  Organism: Blood Culture PCR (22 @ 23:20)      -  Blood PCR Panel: NEG      Method Type: PCR      Current Inpatient Medications:  ondansetron Injectable 4 milliGRAM(s) IV Push once PRN  sodium chloride 0.9%. 500 milliLiter(s) (75 mL/Hr) IV Continuous <Continuous>      
HPI:  HPI: The patient is a 89-year-old male with history of CAD, DM2, HTN, HLD, BPH,  s/p rad.Tx for porstate CA was hospitalized on 8/11/22 and diagnosed with acute cholecystitis.  The patient was a high risk for laparoscopic cholecystectomy because of his cardiac condition and cholecystostomy tube placement was offered.  The patient declined the procedure and was discharged home yesterday on Augmentin.  The patient was recalled to the emergency room because his blood culture grew gram variable rods.  The patient was started on IV Unasyn in the ER.  In the emergency room the patient developed sinus tachycardia with the elevated heart rate to 140.  Patient denies chest pain or abdominal pain nausea vomiting.    8/17/22:  EP team asked to see this patient for SVT.  Pt has bacteremia, s/p guided percutaneous cholecystostomy tube on 8/16.  He has prior hx of SVT for years- at home he was on Coreg BID and took metoprolol prn for his palpitations.  This morning he had rapid response team called for 's but was asymptomatic and VSS, transferred to tele floor.  Currently he is lying in bed, in NAD, denies any c/o and family is at bedside.  TELE: -130 bpm, earlier today rates to 150 bpm  EKG:  bpm        PAST MEDICAL & SURGICAL HISTORY:  BPH (benign prostatic hyperplasia)  HTN (hypertension)  HLD (hyperlipidemia)  GERD (gastroesophageal reflux disease)  NSVT (nonsustained ventricular tachycardia)  Palpitations  Prostate cancer  s/p radiation  CAD S/P percutaneous coronary angioplasty  S/P cardiac catheterization  diagnostic 12/15/14      Family Hx: Father was alcoholic,  Mother :  colon CA,,  lives with his wife    Social Hx.: not smoking, no alcohol use          MEDICATIONS  (STANDING):  clopidogrel Tablet 75 milliGRAM(s) Oral daily  cyanocobalamin 1000 MICROGram(s) Oral daily  dextrose 5%. 1000 milliLiter(s) (50 mL/Hr) IV Continuous <Continuous>  dextrose 5%. 1000 milliLiter(s) (100 mL/Hr) IV Continuous <Continuous>  dextrose 50% Injectable 25 Gram(s) IV Push once  dextrose 50% Injectable 12.5 Gram(s) IV Push once  dextrose 50% Injectable 25 Gram(s) IV Push once  glucagon  Injectable 1 milliGRAM(s) IntraMuscular once  heparin   Injectable 5000 Unit(s) SubCutaneous every 12 hours  insulin lispro (ADMELOG) corrective regimen sliding scale   SubCutaneous at bedtime  insulin lispro (ADMELOG) corrective regimen sliding scale   SubCutaneous three times a day before meals  metoprolol tartrate 50 milliGRAM(s) Oral every 12 hours  piperacillin/tazobactam IVPB.. 3.375 Gram(s) IV Intermittent every 8 hours  polyethylene glycol 3350 17 Gram(s) Oral daily  potassium phosphate IVPB 15 milliMole(s) IV Intermittent once  senna 2 Tablet(s) Oral at bedtime  tamsulosin 0.4 milliGRAM(s) Oral at bedtime    MEDICATIONS  (PRN):  acetaminophen     Tablet .. 650 milliGRAM(s) Oral every 6 hours PRN Temp greater or equal to 38C (100.4F), Mild Pain (1 - 3)  aluminum hydroxide/magnesium hydroxide/simethicone Suspension 30 milliLiter(s) Oral every 4 hours PRN Dyspepsia  dextrose Oral Gel 15 Gram(s) Oral once PRN Blood Glucose LESS THAN 70 milliGRAM(s)/deciliter  HYDROmorphone  Injectable 0.5 milliGRAM(s) IV Push every 3 hours PRN Severe Pain (7 - 10)  melatonin 3 milliGRAM(s) Oral at bedtime PRN Insomnia  ondansetron Injectable 4 milliGRAM(s) IV Push once PRN Nausea and/or Vomiting  oxyCODONE    IR 5 milliGRAM(s) Oral every 6 hours PRN Moderate Pain (4 - 6)      Allergies    No Known Allergies    Intolerances        SOCIAL HISTORY: Denies tobacco, etoh abuse or illicit drug use    FAMILY HISTORY:  Family history of MI (myocardial infarction) (Father)    Family history of uterine cancer (Mother)        Vital Signs Last 24 Hrs  T(C): 36.8 (17 Aug 2022 10:37), Max: 37.3 (16 Aug 2022 20:14)  T(F): 98.2 (17 Aug 2022 10:37), Max: 99.1 (16 Aug 2022 20:14)  HR: 114 (17 Aug 2022 10:37) (70 - 147)  BP: 129/74 (17 Aug 2022 10:37) (129/74 - 167/59)  BP(mean): 91 (17 Aug 2022 08:15) (91 - 91)  RR: 18 (17 Aug 2022 10:37) (18 - 18)  SpO2: 97% (17 Aug 2022 10:37) (94% - 97%)    Parameters below as of 17 Aug 2022 10:37  Patient On (Oxygen Delivery Method): room air        REVIEW OF SYSTEMS:    CONSTITUTIONAL:  As per HPI.  HEENT:  Eyes:  No diplopia or blurred vision. ENT:  No earache, sore throat or runny nose.  CARDIOVASCULAR:  No pressure, squeezing, strangling, tightness, heaviness or aching about the chest, neck, axilla or epigastrium.  RESPIRATORY:  No cough, shortness of breath, PND or orthopnea.  GASTROINTESTINAL:  No nausea, vomiting or diarrhea.  GENITOURINARY:  No dysuria, frequency or urgency.  MUSCULOSKELETAL:  As per HPI.  SKIN:  No change in skin, hair or nails.  NEUROLOGIC:  No paresthesias, fasciculations, seizures or weakness.  PSYCHIATRIC:  No disorder of thought or mood.  ENDOCRINE:  No heat or cold intolerance, polyuria or polydipsia.  HEMATOLOGICAL:  No easy bruising or bleedings:  .     PHYSICAL EXAMINATION:    GENERAL APPEARANCE:  Pt. is not currently dyspneic, in no distress. Pt. is alert, oriented, and pleasant.  HEENT:  Pupils are normal and react normally. No icterus. Mucous membranes well colored.  NECK:  Supple. No lymphadenopathy. Jugular venous pressure not elevated. Carotids equal.   HEART:   The cardiac impulse has a normal quality. There are no murmurs, rubs or gallops noted  CHEST:  Chest is clear to auscultation. Normal respiratory effort.  ABDOMEN:  Soft and nontender. Right cholecystostomy tube with dark brown drainage.  EXTREMITIES:  There is no edema.   SKIN:  No rash or significant lesions are noted.    I&O's Summary    16 Aug 2022 07:01  -  17 Aug 2022 07:00  --------------------------------------------------------  IN: 272 mL / OUT: 705 mL / NET: -433 mL        LABS:                        9.2    7.31  )-----------( 239      ( 17 Aug 2022 06:43 )             27.5     08-17    138  |  108  |  5<L>  ----------------------------<  119<H>  3.7   |  23  |  0.43<L>    Ca    7.6<L>      17 Aug 2022 06:43  Phos  2.1     08-17  Mg     2.2     08-17    TPro  5.4<L>  /  Alb  2.0<L>  /  TBili  0.3  /  DBili  0.1  /  AST  25  /  ALT  29  /  AlkPhos  81  08-17    LIVER FUNCTIONS - ( 17 Aug 2022 06:43 )  Alb: 2.0 g/dL / Pro: 5.4 gm/dL / ALK PHOS: 81 U/L / ALT: 29 U/L / AST: 25 U/L / GGT: x                     CARDIAC TESTS:    < from: TTE Echo Complete w/o Contrast w/ Doppler (08.12.22 @ 10:10) >   Impression     Summary     The left ventricle is normal in size, wall motion, and contractility.   Mild concentric left ventricular hypertrophy is present.   Estimated left ventricular ejection fraction is 60-65 %.   Normal appearing right ventricle structure and function.   Mild aortic sclerosis is present with normal valvular opening.   Mild to moderate aortic regurgitation is present.   Mild mitral annular calcification is present.   The mitral valve leaflets appear mildly thickened.   Mild (1+) mitral regurgitation is present.   The tricuspid valve leaflets are thin and pliable; valve motion is   normal.   Moderate (2+) eccentric tricuspid valve regurgitation is present.   Moderate to severe pulmonary hypertension.   Normal appearing pulmonic valve structure.   Mild pulmonic valvular regurgitation (1+) is present.   No evidence of pericardial effusion.    
Chief Complaint:  Patient is a 89y old  Male who presents with a chief complaint of cholecystitis    HPI:   The patient is a 89-year-old male with history of CAD, DM2, HTN, HLD, BPH,  s/p rad.Tx for porstate CA was hospitalized on 8/11/22 and diagnosed with acute cholecystitis.  The patient was a high risk for laparoscopic cholecystectomy because of his cardiac condition and cholecystostomy tube placement was offered.  The patient declined the procedure and was discharged home yesterday on Augmentin.  The patient was recalled to the emergency room because his blood culture grew gram variable rods.  The patient was started on IV Unasyn in the ER.  In the emergency room the patient developed sinus tachycardia with the elevated heart rate to 140.  Pt as admitted, and repeat HIDA was positive. IR consulted for cholecystostomy placement. Pt seen at bedside, feeling well. Denied fever, pain, SOB, NVD      Allergies  No Known Allergies    MEDICATIONS  (STANDING):  cyanocobalamin 1000 MICROGram(s) Oral daily  dextrose 5%. 1000 milliLiter(s) (50 mL/Hr) IV Continuous <Continuous>  dextrose 5%. 1000 milliLiter(s) (100 mL/Hr) IV Continuous <Continuous>  dextrose 50% Injectable 25 Gram(s) IV Push once  dextrose 50% Injectable 12.5 Gram(s) IV Push once  dextrose 50% Injectable 25 Gram(s) IV Push once  glucagon  Injectable 1 milliGRAM(s) IntraMuscular once  heparin   Injectable 5000 Unit(s) SubCutaneous every 12 hours  insulin lispro (ADMELOG) corrective regimen sliding scale   SubCutaneous at bedtime  insulin lispro (ADMELOG) corrective regimen sliding scale   SubCutaneous three times a day before meals  metoprolol tartrate 50 milliGRAM(s) Oral every 12 hours  piperacillin/tazobactam IVPB.. 3.375 Gram(s) IV Intermittent every 8 hours  potassium chloride    Tablet ER 40 milliEquivalent(s) Oral every 4 hours    MEDICATIONS  (PRN):  acetaminophen     Tablet .. 650 milliGRAM(s) Oral every 6 hours PRN Temp greater or equal to 38C (100.4F), Mild Pain (1 - 3)  aluminum hydroxide/magnesium hydroxide/simethicone Suspension 30 milliLiter(s) Oral every 4 hours PRN Dyspepsia  dextrose Oral Gel 15 Gram(s) Oral once PRN Blood Glucose LESS THAN 70 milliGRAM(s)/deciliter  melatonin 3 milliGRAM(s) Oral at bedtime PRN Insomnia  ondansetron Injectable 4 milliGRAM(s) IV Push once PRN Nausea and/or Vomiting      PAST MEDICAL & SURGICAL HISTORY:  BPH (benign prostatic hyperplasia)      HTN (hypertension)      HLD (hyperlipidemia)      GERD (gastroesophageal reflux disease)      NSVT (nonsustained ventricular tachycardia)      Palpitations      Prostate cancer  s/p radiation      CAD S/P percutaneous coronary angioplasty      S/P cardiac catheterization  diagnostic 12/15/14          FAMILY HISTORY:  Family history of MI (myocardial infarction) (Father)    Family history of uterine cancer (Mother)        Review of Systems:  As per HPI    Vital Signs Last 24 Hrs  T(C): 36.7 (15 Aug 2022 07:30), Max: 36.7 (14 Aug 2022 19:15)  T(F): 98.1 (15 Aug 2022 07:30), Max: 98.1 (14 Aug 2022 19:15)  HR: 67 (15 Aug 2022 07:30) (67 - 139)  BP: 126/39 (15 Aug 2022 07:30) (126/39 - 152/52)  BP(mean): 88 (15 Aug 2022 06:28) (88 - 88)  RR: 18 (15 Aug 2022 07:30) (18 - 19)  SpO2: 96% (15 Aug 2022 07:30) (94% - 96%)    Parameters below as of 15 Aug 2022 07:30  Patient On (Oxygen Delivery Method): room air        GENERAL APPEARANCE: Well developed, in no acute distress.    LUNGS: Auscultation of the lungs revealed normal breath sounds without any other adventitious sounds or rubs.    CARDIOVASCULAR: There was a regular rate and rhythm    ABDOMEN: Soft and nontender with normal bowel sounds.     NEUROLOGIC: Alert and oriented x 3. Normal affect.     CBC                        9.5    10.45 )-----------( 213      ( 15 Aug 2022 06:51 )             29.2       Chemistry  08-15    138  |  106  |  14  ----------------------------<  100<H>  3.2<L>   |  23  |  0.68    Ca    7.8<L>      15 Aug 2022 06:51    TPro  5.5<L>  /  Alb  2.2<L>  /  TBili  0.5  /  DBili  x   /  AST  33  /  ALT  31  /  AlkPhos  78  08-15    Coags  PT/INR - ( 15 Aug 2022 08:50 )   PT: 13.4 sec;   INR: 1.15 ratio    PTT - ( 15 Aug 2022 08:50 )  PTT:25.6 sec    < from: NM Hepatobiliary Imaging (08.15.22 @ 14:05) >  IMPRESSION: Abnormal hepatobiliary scan.    In the proper clinical setting, findings are consistent with acute   cholecystitis.    < end of copied text >

## 2022-08-17 NOTE — PROGRESS NOTE ADULT - SUBJECTIVE AND OBJECTIVE BOX
Chief Complaint: Called back for positive blood cultures    Interval Hx: Underwent successful cholecystostomy drain placement yesterday for ultimate source control. Patient seen and examined. Felt improved since admission. No longer with RUQ tenderness. No other complaints. Leukocytosis resolved. Rpt Bld cx with no growth to date. Nonetheless, in the mid morning he was noted to have elevated HR, other vitals WNL. EKG revealed narrow complex tachycardia for which he was given a dose of IV Lopressor, transferred from 5S Med/Surg to 3N Med/Tele, and then placed on a Cardizem drip.    ROS: All other systems reviewed and found to be negative with the exception of some physical deconditioning    Vitals:  T(F): 98.3 (17 Aug 2022 17:24), Max: 99.1 (16 Aug 2022 20:14)  HR: 122 (17 Aug 2022 17:24) (70 - 147)  BP: 144/75 (17 Aug 2022 17:24) (129/74 - 167/59)  BP(mean): 91 (17 Aug 2022 08:15) (91 - 91)  RR: 18 (17 Aug 2022 17:24) (18 - 18)  SpO2: 97% (17 Aug 2022 17:24) (94% - 97%) on RA    Exam:  Gen: No acute distress  HEENT: NCAT PERRL EOMI MMM  Neck: Supple, no LAD, no JVD  CVS:  S1 S2 normal, tachycardic, slightly irregular rhythm  Chest: Normal resp effort, lungs CTA b/L  Abd: +BS, non distended, soft, non tender, RUQ cholecystostomy drain site C/D/I, drainage bag contents bilious, non-bloody  Ext: No edema or calf tenderness  Skin: Warm, dry  Neuro: A+OX3, no deficits    Labs:                       9.2    7.31 )-----------( 239              27.5       138  |  108  |  5  ----------------------< 119  3.7   |   23   |  0.43    Ca 7.6 (WNL when corrected for albumin)    Phos  2.1   Mg  2.2    TPro  5.4  /  Alb  2.0  /  TBili  0.3  /  DBili  0.1  /  AST  25  /  ALT  29  /  AlkPhos  81      Urinalysis 8/13: Clear, N-, LE-, RBC 3-5, WBC 0-2, bact occ    Micro:  Bile fluid culture 8/16: GPCs on Gram's stain, culture in process  Urine culture 8/13: Negative  Blood culture 8/13: Negative  Blood culture 8/13: Negative  Urine culture 8/12: Negative  Blood culture 8/11: Gram variable rods, still in process  Blood culture 8/11: Negative    Imaging:  NM HIDA scan 8/15: Prompt, homogeneous uptake of radiotracer by the hepatocytes. Activity is first seen in the bowel at 20 minutes. The gallbladder is not seen at any time during the study. There is good clearance of activity from the liver by the end of the study. Abnormal hepatobiliary scan. In the proper clinical setting, findings are consistent with acute cholecystitis.    Cardiac Testing:  (Prior admission)  Echo 8/12/22: The left ventricle is normal in size, wall motion, and contractility. Mild concentric left ventricular hypertrophy is present. Estimated left ventricular ejection fraction is 60-65 %. Normal appearing right ventricle structure and function. Mild aortic sclerosis is present with normal valvular opening. Mild to moderate aortic regurgitation is present. Mild mitral annular calcification is present. The mitral valve leaflets appear mildly thickened. Mild (1+) mitral regurgitation is present. The tricuspid valve leaflets are thin and pliable; valve motion is normal. Moderate (2+) eccentric tricuspid valve regurgitation is present. Moderate to severe pulmonary hypertension. Normal appearing pulmonic valve structure. Mild pulmonic valvular regurgitation (1+) is present. No evidence of pericardial effusion.    Procedures:  Cholecystostomy tube placement 8/16/22 by IR    Meds:  MEDICATIONS  (STANDING):  clopidogrel Tablet 75 milliGRAM(s) Oral daily  cyanocobalamin 1000 MICROGram(s) Oral daily  diltiazem Infusion 5 mG/Hr (5 mL/Hr) IV Continuous <Continuous>  glucagon  Injectable 1 milliGRAM(s) IntraMuscular once  Insulin lispro (ADMELOG) corrective regimen sliding scale   SubCutaneous at bedtime  insulin lispro (ADMELOG) corrective regimen sliding scale   SubCutaneous three times a day before meals  metoprolol tartrate 50 milliGRAM(s) Oral every 12 hours  piperacillin/tazobactam IVPB.. 3.375 Gram(s) IV Intermittent every 8 hours  polyethylene glycol 3350 17 Gram(s) Oral daily  senna 2 Tablet(s) Oral at bedtime  tamsulosin 0.4 milliGRAM(s) Oral at bedtime    MEDICATIONS  (PRN):  acetaminophen     Tablet .. 650 milliGRAM(s) Oral every 6 hours PRN Temp greater or equal to 38C (100.4F), Mild Pain (1 - 3)  aluminum hydroxide/magnesium hydroxide/simethicone Suspension 30 milliLiter(s) Oral every 4 hours PRN Dyspepsia  dextrose Oral Gel 15 Gram(s) Oral once PRN Blood Glucose LESS THAN 70 milliGRAM(s)/deciliter  HYDROmorphone  Injectable 0.5 milliGRAM(s) IV Push every 3 hours PRN Severe Pain (7 - 10)  melatonin 3 milliGRAM(s) Oral at bedtime PRN Insomnia  ondansetron Injectable 4 milliGRAM(s) IV Push once PRN Nausea and/or Vomiting  oxyCODONE    IR 5 milliGRAM(s) Oral every 6 hours PRN Moderate Pain (4 - 6)

## 2022-08-17 NOTE — PROGRESS NOTE ADULT - ASSESSMENT
89-year-old male with history of CAD, DM2, HTN, HLD, BPH,  s/p rad.Tx for prostate CA was hospitalized on 8/11/22 and diagnosed with acute cholecystitis.  The patient was a high risk for laparoscopic cholecystectomy because of his cardiac condition and cholecystostomy tube placement was offered.  The patient declined the procedure and was discharged home day prior to admit on Augmentin.  The patient was recalled to the emergency room because his blood culture grew gram variable rods.  The patient was started on IV Unasyn in the ER.  In the emergency room the patient developed sinus tachycardia with the elevated heart rate to 140.  Patient denies chest pain or abdominal pain nausea vomiting.     1. acute cholecystitis. bacteremia - gram variable rods  - blood 1 bottle 8/11 growing gram variable rods ? contaminant vs gi tract translocation f/u id  - repeat blood cx no growth 8/13, 8/15  - s/p perc cholecystostomy placement g/s gpc pairs f/u cx   - on IV zosyn #4   - continue with abx coverage  - await cx, po abx on dc likely po augmentin 10-14 day course  - monitor temps  - tolerating abx well so far; no side effects noted  - reason for abx use and side effects reviewed with patient  - supportive care  - fu cbc    2. other issues - care per medicine

## 2022-08-17 NOTE — PROGRESS NOTE ADULT - SUBJECTIVE AND OBJECTIVE BOX
RRT called for SVT    on arrival patient asymptomatic, bp ok   narrow complex svt  Paitent had epidose earlier in hospitaliztion  given dose of lopressor   appear sinus  transfer to tele

## 2022-08-17 NOTE — PROGRESS NOTE ADULT - ASSESSMENT
89-year-old male with DM, HTN, HLD, CAD, BPH, prostate CA s/p rad was hospitalized on 8/11/22 and diagnosed with acute cholecystitis, declined interventions and was discharged on PO abx, called back for positive blood culture (Gram variable rods), placed on Zosyn and admitted to Medicine.     Sepsis with bacteremia due to acute cholecystitis  Lactate elevated on ED visit but not now. WBC elevated on admission. Bld Cx + for gram variable rods. Repeat blood culture negative to date. HIDA 8/15 positive, suggestive of acute cholecystitis. Patient overall improved with Zosyn. Leukocytosis resolved. RUQ resolved. S/P cholecystostomy drain placement by IR 8/16.  - C/w cholecystostomy drain, monitor output  - Continue Zosyn, anticipate eventual transition to PO Augmentin to complete 14 days unless final cultures suggest otherwise  - Trend labs    CAD, HTN, HLD  Elevated troponin likely demand ischemia in setting on infection. No regional wall motion abnormality on echo.   - Continue metoprolol, clopidogrel    Dysrhythmia  SVT upon admission, now with another narrow complex tachycardia. Consulted Cardiac EP. Started on Cardizem. Repleted K and Phos. Etiology of his dysrhythmia a bit unclear. No signs of bleeding. Infection appears to be improving.   - Monitor lytes  - Continue metoprolol  - Continue Cardizem drip  - F/u with Cardiac EP    DM2  A1c 6.6  - Monitor glucose  - Continue insulin    Hypovolemic hyponatremia  Resolved       89-year-old male with DM, HTN, HLD, CAD, BPH, prostate CA s/p rad was hospitalized on 8/11/22 and diagnosed with acute cholecystitis, declined interventions and was discharged on PO abx, called back for positive blood culture (Gram variable rods), placed on Zosyn and admitted to Medicine.     Sepsis with bacteremia due to acute cholecystitis  Lactate elevated on ED visit but not now. WBC elevated on admission. Bld Cx + for gram variable rods. Repeat blood culture negative to date. HIDA 8/15 positive, suggestive of acute cholecystitis. Patient overall improved with Zosyn. Leukocytosis resolved. RUQ resolved. S/P cholecystostomy drain placement by IR 8/16.  - Continue with cholecystostomy drain, monitor output. Continue drain in-place upon discharge with plan for referral back to IR as outpatient for tube study in 6 weeks or so. IR outpatient [phone]. Dressing should be changed every 2-3 days or whenever soiled. No need to flush unless clogged. IR advised that patient should have referral for the tube study via Surgery Team that was following patient, that is, via Dr. Murray.   - Continue Zosyn, anticipate eventual transition to PO Augmentin to complete 14 days unless final cultures suggest otherwise  - Trend labs    CAD, HTN, HLD  Elevated troponin likely demand ischemia in setting on infection. No regional wall motion abnormality on echo.   - Continue metoprolol, clopidogrel    Dysrhythmia  SVT upon admission, now with another narrow complex tachycardia. Consulted Cardiac EP. Started on Cardizem. Repleted K and Phos. Etiology of his dysrhythmia a bit unclear. No signs of bleeding. Infection appears to be improving.   - Monitor lytes  - Continue metoprolol  - Continue Cardizem drip  - F/u with Cardiac EP    DM2  A1c 6.6  - Monitor glucose  - Continue insulin    Hypovolemic hyponatremia  Resolved

## 2022-08-17 NOTE — PROVIDER CONTACT NOTE (CRITICAL VALUE NOTIFICATION) - SITUATION
Patient troponin 163.98. Patient asymptomatic. MD Morgan aware. Order for one time dose of Aspirin 81 mg PO placed.
Called in body fluid culture.

## 2022-08-18 LAB
ANION GAP SERPL CALC-SCNC: 7 MMOL/L — SIGNIFICANT CHANGE UP (ref 5–17)
BUN SERPL-MCNC: 5 MG/DL — LOW (ref 7–23)
CALCIUM SERPL-MCNC: 7.8 MG/DL — LOW (ref 8.5–10.1)
CHLORIDE SERPL-SCNC: 108 MMOL/L — SIGNIFICANT CHANGE UP (ref 96–108)
CO2 SERPL-SCNC: 23 MMOL/L — SIGNIFICANT CHANGE UP (ref 22–31)
CREAT SERPL-MCNC: 0.5 MG/DL — SIGNIFICANT CHANGE UP (ref 0.5–1.3)
CULTURE RESULTS: SIGNIFICANT CHANGE UP
CULTURE RESULTS: SIGNIFICANT CHANGE UP
EGFR: 97 ML/MIN/1.73M2 — SIGNIFICANT CHANGE UP
GLUCOSE SERPL-MCNC: 132 MG/DL — HIGH (ref 70–99)
HCT VFR BLD CALC: 28.9 % — LOW (ref 39–50)
HGB BLD-MCNC: 9.4 G/DL — LOW (ref 13–17)
MAGNESIUM SERPL-MCNC: 2.5 MG/DL — SIGNIFICANT CHANGE UP (ref 1.6–2.6)
MCHC RBC-ENTMCNC: 28.3 PG — SIGNIFICANT CHANGE UP (ref 27–34)
MCHC RBC-ENTMCNC: 32.5 GM/DL — SIGNIFICANT CHANGE UP (ref 32–36)
MCV RBC AUTO: 87 FL — SIGNIFICANT CHANGE UP (ref 80–100)
PHOSPHATE SERPL-MCNC: 2.8 MG/DL — SIGNIFICANT CHANGE UP (ref 2.5–4.5)
PLATELET # BLD AUTO: 275 K/UL — SIGNIFICANT CHANGE UP (ref 150–400)
POTASSIUM SERPL-MCNC: 4 MMOL/L — SIGNIFICANT CHANGE UP (ref 3.5–5.3)
POTASSIUM SERPL-SCNC: 4 MMOL/L — SIGNIFICANT CHANGE UP (ref 3.5–5.3)
RBC # BLD: 3.32 M/UL — LOW (ref 4.2–5.8)
RBC # FLD: 13.6 % — SIGNIFICANT CHANGE UP (ref 10.3–14.5)
SODIUM SERPL-SCNC: 138 MMOL/L — SIGNIFICANT CHANGE UP (ref 135–145)
SPECIMEN SOURCE: SIGNIFICANT CHANGE UP
SPECIMEN SOURCE: SIGNIFICANT CHANGE UP
WBC # BLD: 6.64 K/UL — SIGNIFICANT CHANGE UP (ref 3.8–10.5)
WBC # FLD AUTO: 6.64 K/UL — SIGNIFICANT CHANGE UP (ref 3.8–10.5)

## 2022-08-18 PROCEDURE — 99233 SBSQ HOSP IP/OBS HIGH 50: CPT

## 2022-08-18 RX ORDER — DILTIAZEM HCL 120 MG
30 CAPSULE, EXT RELEASE 24 HR ORAL EVERY 6 HOURS
Refills: 0 | Status: DISCONTINUED | OUTPATIENT
Start: 2022-08-18 | End: 2022-08-21

## 2022-08-18 RX ORDER — METOPROLOL TARTRATE 50 MG
50 TABLET ORAL EVERY 8 HOURS
Refills: 0 | Status: DISCONTINUED | OUTPATIENT
Start: 2022-08-18 | End: 2022-08-18

## 2022-08-18 RX ORDER — METOPROLOL TARTRATE 50 MG
50 TABLET ORAL
Refills: 0 | Status: DISCONTINUED | OUTPATIENT
Start: 2022-08-18 | End: 2022-08-18

## 2022-08-18 RX ORDER — METOPROLOL TARTRATE 50 MG
50 TABLET ORAL EVERY 12 HOURS
Refills: 0 | Status: DISCONTINUED | OUTPATIENT
Start: 2022-08-18 | End: 2022-08-20

## 2022-08-18 RX ADMIN — OXYCODONE HYDROCHLORIDE 5 MILLIGRAM(S): 5 TABLET ORAL at 06:55

## 2022-08-18 RX ADMIN — Medication 30 MILLIGRAM(S): at 18:06

## 2022-08-18 RX ADMIN — PIPERACILLIN AND TAZOBACTAM 25 GRAM(S): 4; .5 INJECTION, POWDER, LYOPHILIZED, FOR SOLUTION INTRAVENOUS at 15:15

## 2022-08-18 RX ADMIN — OXYCODONE HYDROCHLORIDE 5 MILLIGRAM(S): 5 TABLET ORAL at 06:25

## 2022-08-18 RX ADMIN — HEPARIN SODIUM 5000 UNIT(S): 5000 INJECTION INTRAVENOUS; SUBCUTANEOUS at 10:03

## 2022-08-18 RX ADMIN — POLYETHYLENE GLYCOL 3350 17 GRAM(S): 17 POWDER, FOR SOLUTION ORAL at 10:00

## 2022-08-18 RX ADMIN — Medication 50 MILLIGRAM(S): at 21:25

## 2022-08-18 RX ADMIN — Medication 30 MILLIGRAM(S): at 13:04

## 2022-08-18 RX ADMIN — PREGABALIN 1000 MICROGRAM(S): 225 CAPSULE ORAL at 10:03

## 2022-08-18 RX ADMIN — PIPERACILLIN AND TAZOBACTAM 25 GRAM(S): 4; .5 INJECTION, POWDER, LYOPHILIZED, FOR SOLUTION INTRAVENOUS at 05:55

## 2022-08-18 RX ADMIN — CLOPIDOGREL BISULFATE 75 MILLIGRAM(S): 75 TABLET, FILM COATED ORAL at 10:00

## 2022-08-18 RX ADMIN — HEPARIN SODIUM 5000 UNIT(S): 5000 INJECTION INTRAVENOUS; SUBCUTANEOUS at 21:22

## 2022-08-18 RX ADMIN — TAMSULOSIN HYDROCHLORIDE 0.4 MILLIGRAM(S): 0.4 CAPSULE ORAL at 21:24

## 2022-08-18 RX ADMIN — PIPERACILLIN AND TAZOBACTAM 25 GRAM(S): 4; .5 INJECTION, POWDER, LYOPHILIZED, FOR SOLUTION INTRAVENOUS at 21:25

## 2022-08-18 NOTE — PROVIDER CONTACT NOTE (OTHER) - ASSESSMENT
Received pt. with cardizem drip @ 5ml/hr/ @2000H pt. HR=62/ NSR, /100 no complaints. Latest HR at 60s-70s.
Pt. off cardizem drip since 2000H 8/17.   @0330H - SO=392 afib on tele monitor, /83, O2 97% on RA. (+) palpitation per pt.
No

## 2022-08-18 NOTE — PROGRESS NOTE ADULT - ASSESSMENT
89 yo M with above PMHx who presents was recently admitted for acute cholecystitis refusing cholecystostomy who presents because of positive BCx. Course complicated by SVT    -SVT has resolved but intermittnet - likely from possible from infection   increased metoprolol to 50 TID from BID    s/p IR drain placement for GB-- on abx    CAD - ( hx of) now with Minimal elevation in CE,  likely due to physiologic strain - continue plavix recommend statin ( was on lipitor 10) when able to take PO      Ideally should have ischemic work up when stable , but patient has repeated refused       will continue to follow

## 2022-08-18 NOTE — PROGRESS NOTE ADULT - SUBJECTIVE AND OBJECTIVE BOX
HPI:  HPI: The patient is a 89-year-old male with history of CAD, DM2, HTN, HLD, BPH,  s/p rad.Tx for porstate CA was hospitalized on 8/11/22 and diagnosed with acute cholecystitis.  The patient was a high risk for laparoscopic cholecystectomy because of his cardiac condition and cholecystostomy tube placement was offered.  The patient declined the procedure and was discharged home yesterday on Augmentin.  The patient was recalled to the emergency room because his blood culture grew gram variable rods.  The patient was started on IV Unasyn in the ER.  In the emergency room the patient developed sinus tachycardia with the elevated heart rate to 140.  Patient denies chest pain or abdominal pain nausea vomiting.    PMHx: CAD,DM2, HTN, HLD, BPH,  s/p rad.Tx for porstate     PSHx: PCI  stents x 3 at  by Dr. Sharma  2010 approx. for positive stress test. Had  radiation  at [facility] in [place] for  Prostate CA in 2007.    Family Hx: Father was alcoholic,  Mother :  colon CA,,  lives with his wife    Social Hx.: not smoking, no alcohol use             (13 Aug 2022 19:27)      Subjective:    EKG:  TELE:    MEDICATIONS  (STANDING):  clopidogrel Tablet 75 milliGRAM(s) Oral daily  cyanocobalamin 1000 MICROGram(s) Oral daily  dextrose 5%. 1000 milliLiter(s) (50 mL/Hr) IV Continuous <Continuous>  dextrose 5%. 1000 milliLiter(s) (100 mL/Hr) IV Continuous <Continuous>  dextrose 50% Injectable 25 Gram(s) IV Push once  dextrose 50% Injectable 12.5 Gram(s) IV Push once  dextrose 50% Injectable 25 Gram(s) IV Push once  diltiazem Infusion 5 mG/Hr (5 mL/Hr) IV Continuous <Continuous>  glucagon  Injectable 1 milliGRAM(s) IntraMuscular once  heparin   Injectable 5000 Unit(s) SubCutaneous every 12 hours  insulin lispro (ADMELOG) corrective regimen sliding scale   SubCutaneous at bedtime  insulin lispro (ADMELOG) corrective regimen sliding scale   SubCutaneous three times a day before meals  metoprolol tartrate 50 milliGRAM(s) Oral every 8 hours  piperacillin/tazobactam IVPB.. 3.375 Gram(s) IV Intermittent every 8 hours  polyethylene glycol 3350 17 Gram(s) Oral daily  senna 2 Tablet(s) Oral at bedtime  tamsulosin 0.4 milliGRAM(s) Oral at bedtime    MEDICATIONS  (PRN):  acetaminophen     Tablet .. 650 milliGRAM(s) Oral every 6 hours PRN Temp greater or equal to 38C (100.4F), Mild Pain (1 - 3)  aluminum hydroxide/magnesium hydroxide/simethicone Suspension 30 milliLiter(s) Oral every 4 hours PRN Dyspepsia  dextrose Oral Gel 15 Gram(s) Oral once PRN Blood Glucose LESS THAN 70 milliGRAM(s)/deciliter  HYDROmorphone  Injectable 0.5 milliGRAM(s) IV Push every 3 hours PRN Severe Pain (7 - 10)  melatonin 3 milliGRAM(s) Oral at bedtime PRN Insomnia  ondansetron Injectable 4 milliGRAM(s) IV Push once PRN Nausea and/or Vomiting  oxyCODONE    IR 5 milliGRAM(s) Oral every 6 hours PRN Moderate Pain (4 - 6)      Allergies    No Known Allergies          Vital Signs Last 24 Hrs  T(C): 37.1 (17 Aug 2022 20:53), Max: 37.1 (17 Aug 2022 20:53)  T(F): 98.7 (17 Aug 2022 20:53), Max: 98.7 (17 Aug 2022 20:53)  HR: 62 (18 Aug 2022 08:18) (62 - 132)  BP: 153/82 (18 Aug 2022 08:18) (133/100 - 153/82)  BP(mean): --  RR: 18 (18 Aug 2022 08:18) (18 - 18)  SpO2: 97% (18 Aug 2022 08:18) (96% - 97%)    Parameters below as of 18 Aug 2022 08:18  Patient On (Oxygen Delivery Method): room air        PHYSICAL EXAMINATION:  GENERAL: In no apparent distress, well nourished, and hydrated.  HEAD:  Atraumatic, Normocephalic  EYES: EOMI, PERRLA, conjunctiva and sclera clear  ENMT: No tonsillar erythema, exudates, or enlargement; Moist mucous membranes, Good dentition, No lesions  NECK: Supple and normal thyroid.  No JVD or carotid bruit.  Carotid pulse is 2+ bilaterally.  HEART: Regular rate and rhythm; tachycardic, No murmurs, rubs, or gallops.  PULMONARY: Clear to auscultation and perfusion.  No rales, wheezing, or rhonchi bilaterally.  ABDOMEN: Soft, Nontender, Nondistended; Bowel sounds present  EXTREMITIES:  2+ Peripheral Pulses, No clubbing, cyanosis, or edema  LYMPH: No lymphadenopathy noted  NEUROLOGICAL: Grossly nonfocal    LABS:                        9.4    6.64  )-----------( 275      ( 18 Aug 2022 07:29 )             28.9     08-18    138  |  108  |  5<L>  ----------------------------<  132<H>  4.0   |  23  |  0.50    Ca    7.8<L>      18 Aug 2022 07:29  Phos  2.8     08-18  Mg     2.5     08-18    TPro  5.4<L>  /  Alb  2.0<L>  /  TBili  0.3  /  DBili  0.1  /  AST  25  /  ALT  29  /  AlkPhos  81  08-17              RADIOLOGY & ADDITIONAL TESTS:  Impression     Summary     The left ventricle is normal in size, wall motion, and contractility.   Mild concentric left ventricular hypertrophy is present.   Estimated left ventricular ejection fraction is 60-65 %.   Normal appearing right ventricle structure and function.   Mild aortic sclerosis is present with normal valvular opening.   Mild to moderate aortic regurgitation is present.   Mild mitral annular calcification is present.   The mitral valve leaflets appear mildly thickened.   Mild (1+) mitral regurgitation is present.   The tricuspid valve leaflets are thin and pliable; valve motion is   normal.   Moderate (2+) eccentric tricuspid valve regurgitation is present.   Moderate to severe pulmonary hypertension.   Normal appearing pulmonic valve structure.   Mild pulmonic valvular regurgitation (1+) is present.   No evidence of pericardial effusion.     Signature     ----------------------------------------------------------------   Electronically signed by Jose Solis DO(Interpreting   physician) on 08/12/2022 01:09 PM   ----------------------------------------------------------------       HPI: The patient is a 89-year-old male with history of CAD, DM2, HTN, HLD, BPH,  s/p rad.Tx for porstate CA was hospitalized on 8/11/22 and diagnosed with acute cholecystitis.  The patient was a high risk for laparoscopic cholecystectomy because of his cardiac condition and cholecystostomy tube placement was offered.  The patient declined the procedure and was discharged home yesterday on Augmentin.  The patient was recalled to the emergency room because his blood culture grew gram variable rods.  The patient was started on IV Unasyn in the ER.  In the emergency room the patient developed sinus tachycardia with the elevated heart rate to 140.  Patient denies chest pain or abdominal pain nausea vomiting.    8/17/22:  EP team asked to see this patient for SVT.  Pt has bacteremia, s/p guided percutaneous cholecystostomy tube on 8/16.  He has prior hx of SVT for years- at home he was on Coreg BID and took metoprolol prn for his palpitations.  This morning he had rapid response team called for 's but was asymptomatic and VSS, transferred to tele floor.  Currently he is lying in bed, in NAD, denies any c/o and family is at bedside.  TELE: -130 bpm, earlier today rates to 150 bpm  EKG:  bpm        Subjective: pt examined sitting up in bed, he is back in SVT rate in 130s, minimally symptomatic but aware      TELE: sinus rhythm overnight in 60s, converted by SVT this am 130s    MEDICATIONS  (STANDING):  clopidogrel Tablet 75 milliGRAM(s) Oral daily  cyanocobalamin 1000 MICROGram(s) Oral daily  dextrose 5%. 1000 milliLiter(s) (50 mL/Hr) IV Continuous <Continuous>  dextrose 5%. 1000 milliLiter(s) (100 mL/Hr) IV Continuous <Continuous>  dextrose 50% Injectable 25 Gram(s) IV Push once  dextrose 50% Injectable 12.5 Gram(s) IV Push once  dextrose 50% Injectable 25 Gram(s) IV Push once  diltiazem Infusion 5 mG/Hr (5 mL/Hr) IV Continuous <Continuous>  glucagon  Injectable 1 milliGRAM(s) IntraMuscular once  heparin   Injectable 5000 Unit(s) SubCutaneous every 12 hours  insulin lispro (ADMELOG) corrective regimen sliding scale   SubCutaneous at bedtime  insulin lispro (ADMELOG) corrective regimen sliding scale   SubCutaneous three times a day before meals  metoprolol tartrate 50 milliGRAM(s) Oral every 8 hours  piperacillin/tazobactam IVPB.. 3.375 Gram(s) IV Intermittent every 8 hours  polyethylene glycol 3350 17 Gram(s) Oral daily  senna 2 Tablet(s) Oral at bedtime  tamsulosin 0.4 milliGRAM(s) Oral at bedtime    MEDICATIONS  (PRN):  acetaminophen     Tablet .. 650 milliGRAM(s) Oral every 6 hours PRN Temp greater or equal to 38C (100.4F), Mild Pain (1 - 3)  aluminum hydroxide/magnesium hydroxide/simethicone Suspension 30 milliLiter(s) Oral every 4 hours PRN Dyspepsia  dextrose Oral Gel 15 Gram(s) Oral once PRN Blood Glucose LESS THAN 70 milliGRAM(s)/deciliter  HYDROmorphone  Injectable 0.5 milliGRAM(s) IV Push every 3 hours PRN Severe Pain (7 - 10)  melatonin 3 milliGRAM(s) Oral at bedtime PRN Insomnia  ondansetron Injectable 4 milliGRAM(s) IV Push once PRN Nausea and/or Vomiting  oxyCODONE    IR 5 milliGRAM(s) Oral every 6 hours PRN Moderate Pain (4 - 6)      Allergies    No Known Allergies          Vital Signs Last 24 Hrs  T(C): 37.1 (17 Aug 2022 20:53), Max: 37.1 (17 Aug 2022 20:53)  T(F): 98.7 (17 Aug 2022 20:53), Max: 98.7 (17 Aug 2022 20:53)  HR: 62 (18 Aug 2022 08:18) (62 - 132)  BP: 153/82 (18 Aug 2022 08:18) (133/100 - 153/82)  BP(mean): --  RR: 18 (18 Aug 2022 08:18) (18 - 18)  SpO2: 97% (18 Aug 2022 08:18) (96% - 97%)    Parameters below as of 18 Aug 2022 08:18  Patient On (Oxygen Delivery Method): room air        PHYSICAL EXAMINATION:  GENERAL: In no apparent distress, well nourished, and hydrated.  HEAD:  Atraumatic, Normocephalic  EYES: EOMI, PERRLA, conjunctiva and sclera clear  ENMT: No tonsillar erythema, exudates, or enlargement; Moist mucous membranes, Good dentition, No lesions  NECK: Supple and normal thyroid.  No JVD or carotid bruit.  Carotid pulse is 2+ bilaterally.  HEART: Regular rate and rhythm; tachycardic, No murmurs, rubs, or gallops.  PULMONARY: Clear to auscultation and perfusion.  No rales, wheezing, or rhonchi bilaterally.  ABDOMEN: Soft, Nontender, Nondistended; Bowel sounds present  EXTREMITIES:  2+ Peripheral Pulses, No clubbing, cyanosis, or edema  LYMPH: No lymphadenopathy noted  NEUROLOGICAL: Grossly nonfocal    LABS:                        9.4    6.64  )-----------( 275      ( 18 Aug 2022 07:29 )             28.9     08-18    138  |  108  |  5<L>  ----------------------------<  132<H>  4.0   |  23  |  0.50    Ca    7.8<L>      18 Aug 2022 07:29  Phos  2.8     08-18  Mg     2.5     08-18    TPro  5.4<L>  /  Alb  2.0<L>  /  TBili  0.3  /  DBili  0.1  /  AST  25  /  ALT  29  /  AlkPhos  81  08-17              RADIOLOGY & ADDITIONAL TESTS:  Impression     Summary     The left ventricle is normal in size, wall motion, and contractility.   Mild concentric left ventricular hypertrophy is present.   Estimated left ventricular ejection fraction is 60-65 %.   Normal appearing right ventricle structure and function.   Mild aortic sclerosis is present with normal valvular opening.   Mild to moderate aortic regurgitation is present.   Mild mitral annular calcification is present.   The mitral valve leaflets appear mildly thickened.   Mild (1+) mitral regurgitation is present.   The tricuspid valve leaflets are thin and pliable; valve motion is   normal.   Moderate (2+) eccentric tricuspid valve regurgitation is present.   Moderate to severe pulmonary hypertension.   Normal appearing pulmonic valve structure.   Mild pulmonic valvular regurgitation (1+) is present.   No evidence of pericardial effusion.     Signature     ----------------------------------------------------------------   Electronically signed by B[facility]   physician) on 08/12/2022 01:09 PM   ----------------------------------------------------------------

## 2022-08-18 NOTE — PROGRESS NOTE ADULT - SUBJECTIVE AND OBJECTIVE BOX
Date of service: 22 @ 12:42    pt seen and examined  s/p perc cholecystostomy tube placement   tube in place   denies abd discomfort    ROS: no fever or chills; denies dizziness, no HA, no SOB or cough, no diarrhea or constipation; no dysuria, no urinary frequency, no legs pain, no rashes    MEDICATIONS  (STANDING):  clopidogrel Tablet 75 milliGRAM(s) Oral daily  cyanocobalamin 1000 MICROGram(s) Oral daily  dextrose 5%. 1000 milliLiter(s) (50 mL/Hr) IV Continuous <Continuous>  dextrose 5%. 1000 milliLiter(s) (100 mL/Hr) IV Continuous <Continuous>  dextrose 50% Injectable 25 Gram(s) IV Push once  dextrose 50% Injectable 12.5 Gram(s) IV Push once  dextrose 50% Injectable 25 Gram(s) IV Push once  diltiazem    Tablet 30 milliGRAM(s) Oral every 6 hours  diltiazem Infusion 5 mG/Hr (5 mL/Hr) IV Continuous <Continuous>  glucagon  Injectable 1 milliGRAM(s) IntraMuscular once  heparin   Injectable 5000 Unit(s) SubCutaneous every 12 hours  insulin lispro (ADMELOG) corrective regimen sliding scale   SubCutaneous at bedtime  insulin lispro (ADMELOG) corrective regimen sliding scale   SubCutaneous three times a day before meals  metoprolol tartrate 50 milliGRAM(s) Oral every 8 hours  piperacillin/tazobactam IVPB.. 3.375 Gram(s) IV Intermittent every 8 hours  polyethylene glycol 3350 17 Gram(s) Oral daily  senna 2 Tablet(s) Oral at bedtime  tamsulosin 0.4 milliGRAM(s) Oral at bedtime    Vital Signs Last 24 Hrs  T(C): 37.1 (17 Aug 2022 20:53), Max: 37.1 (17 Aug 2022 20:53)  T(F): 98.7 (17 Aug 2022 20:53), Max: 98.7 (17 Aug 2022 20:53)  HR: 62 (18 Aug 2022 08:18) (62 - 132)  BP: 153/82 (18 Aug 2022 08:18) (133/100 - 153/82)  BP(mean): --  RR: 18 (18 Aug 2022 08:18) (18 - 18)  SpO2: 97% (18 Aug 2022 08:18) (96% - 97%)    Parameters below as of 18 Aug 2022 08:18  Patient On (Oxygen Delivery Method): room air      PE:  Constitutional: frail looking  HEENT: NC/AT, EOMI, PERRLA, conjunctivae clear; ears and nose atraumatic; pharynx benign  Neck: supple; thyroid not palpable  Back: no tenderness  Respiratory: respiratory effort normal; clear to auscultation  Cardiovascular: S1S2 regular, no murmurs  Abdomen: soft,  tender, not distended, positive BS; liver and spleen WNL, + cholecystostomy tube  Genitourinary: no suprapubic tenderness  Lymphatic: no LN palpable  Musculoskeletal: no muscle tenderness, no joint swelling or tenderness  Extremities: no pedal edema  Neurological/ Psychiatric: AxOx3, Judgement and insight normal;  moving all extremities  Skin: no rashes; no palpable lesions    Labs: all available labs reviewed                                   9.4    6.64  )-----------( 275      ( 18 Aug 2022 07:29 )             28.9     08-18    138  |  108  |  5<L>  ----------------------------<  132<H>  4.0   |  23  |  0.50    Ca    7.8<L>      18 Aug 2022 07:29  Phos  2.8     08-18  Mg     2.5     08-18    TPro  5.4<L>  /  Alb  2.0<L>  /  TBili  0.3  /  DBili  0.1  /  AST  25  /  ALT  29  /  AlkPhos  81  08-17          Urinalysis Basic - ( 13 Aug 2022 16:26 )    Color: Yellow / Appearance: Clear / S.020 / pH: x  Gluc: x / Ketone: Moderate  / Bili: Negative / Urobili: Negative   Blood: x / Protein: 100 / Nitrite: Negative   Leuk Esterase: Negative / RBC: 3-5 /HPF / WBC 0-2   Sq Epi: x / Non Sq Epi: Negative / Bacteria: Occasional    Culture - Blood in AM (08.15.22 @ 06:51)   Specimen Source: .Blood None   Culture Results:   No growth to date.   Culture - Urine (22 @ 01:00)   Specimen Source: Clean Catch None   Culture Results:   No growth   Culture - Blood (22 @ 23:04)   Specimen Source: .Blood None   Culture Results:   No growth to date.   Culture - Blood (22 @ 23:04)   Gram Stain:   Growth in anaerobic bottle: Gram Variable Rods   - Blood PCR Panel: NEG       Radiology: all available radiological tests reviewed    Advanced directives addressed: full resuscitation

## 2022-08-18 NOTE — PROVIDER CONTACT NOTE (OTHER) - ACTION/TREATMENT ORDERED:
Restarted cardizem drip @ 5ml/hr. Resident MD made aware. Will continue to monitor the pt.
Cardizem drip d/gretel. Resident MD made aware. Will continue to monitor the patient.
at 2225 patient HR stable at 60's-70's. MD Morgan notified. will continue to monitor.

## 2022-08-18 NOTE — PROGRESS NOTE ADULT - ASSESSMENT
89yo male with long standing history of SVT, managed with metoprolol at home (and takes extra pill as needed for breakthrough svt), admitted with acute cholecystitis with bacteremia, deemed high risk for surgical intervention and on 8/16 he had guided percutaneous cholecystostomy tube and treated with IV abx.   course complicated with recurrence of SVT, he is minimally symptomatic but has been sustaining  rates 140-150 for hours. Converted to sinus after starting Cardizem drip with rate in low 60s, was not transitioned to po cardizem timely and reverted back to SVT rates 130-150s.     Sustained SVT in setting of bacteremia and acute cholecystitis   Continue metoprolol 50 PO BID -  switch to toprol   resumed cardizem drip - covnerted to sinus rates 60-70bpm will change to PO cardizem 30mg q6hrs - if does not maintain sinus would consider antiarrhythmic amiodarone  Keep K>4, Mg>2  Dr Julian discussed EPS / ablation with patient, he is not keen on interventional procedures and prefers medical management   treat underlying infection - abx coverage per ID

## 2022-08-18 NOTE — PROGRESS NOTE ADULT - ASSESSMENT
89-year-old male with DM, HTN, HLD, CAD, BPH, prostate CA s/p rad was hospitalized on 8/11/22 and diagnosed with acute cholecystitis, declined interventions and was discharged on PO abx, called back for positive blood culture (Gram variable rods), placed on Zosyn and admitted to Medicine.     Acute cholecystitis  Lactate elevated on ED visit but not now. WBC elevated on admission. Bld Cx + for gram variable rods. Unclear if true bacteremia as (+) culture was one of two specimen on 8/11, could be contaminant though could be GI translocation. Repeat blood culture negative to date. HIDA 8/15 positive, suggestive of acute cholecystitis. Patient overall improved with Zosyn. Leukocytosis resolved. RUQ resolved. S/P cholecystostomy drain placement by IR 8/16.  - Continue with cholecystostomy drain, monitor output. Continue drain in-place upon discharge with plan for referral back to IR as outpatient for tube study in 6 weeks or so. IR outpatient [phone]. Dressing should be changed every 2-3 days or whenever soiled. No need to flush unless clogged. IR advised that patient should have referral for the tube study via Surgery Team that was following patient, that is, via Dr. Murray.   - Continue Zosyn, anticipate eventual transition to PO Augmentin to complete 14 days unless final cultures suggest otherwise  - Trend labs    CAD, HTN, HLD  Elevated troponin likely demand ischemia in setting on infection. No regional wall motion abnormality on echo.   - Continue metoprolol, clopidogrel    Dysrhythmia  SVT upon admission, now with another narrow complex tachycardia. Consulted Cardiac EP. Started on Cardizem. Repleted K and Phos, now WNL. Etiology of his dysrhythmia a bit unclear. No signs of bleeding. Infection appears to be improving.   - Monitor lytes  - Continue metoprolol  - Continue Cardizem drip, PO Cardizem with plan to titrate off drip at this point  - F/u with Cardiac EP    DM2  A1c 6.6. Glucose 120s-130s today  - Monitor glucose  - Continue insulin    Hypovolemic hyponatremia  Resolved      Dispo: Anticipate DC home when clinically improved and inpatient workup is complete, possibly in 48 hrs

## 2022-08-18 NOTE — PROGRESS NOTE ADULT - SUBJECTIVE AND OBJECTIVE BOX
Chief Complaint: Called back for positive blood cultures    Interval Hx: Patient feeling well today. No complaints. No fever or chills. NO dizziness, lightheadedness, chest pain, or palpitations. No abdominal pain, nausea, vomiting, diarrhea or constipation. Tolerating PO well. Eater to return home soon. He is noted to have recurrence of narrow complex tachycardia for which he is back on Cardizem drip. Also on PO Cardizem since he converted to NSR with the Cardizem drip earlier but did not have Cardizem PO on board and reverted back to arrhythmia. He is otherwise doing very well.     ROS: All other systems reviewed and found to be negative with the exception of some physical deconditioning    Vitals:  T(C): 36.7 (18 Aug 2022 15:26), Max: 37.1 (17 Aug 2022 20:53)  T(F): 98 (18 Aug 2022 15:26), Max: 98.7 (17 Aug 2022 20:53)  HR: 76 (18 Aug 2022 15:26) (62 - 132)  BP: 141/48 (18 Aug 2022 15:26) (133/100 - 153/82)  RR: 18 (18 Aug 2022 15:26) (18 - 18)  SpO2: 98% (18 Aug 2022 15:26) (96% - 98%) on RA    Exam:  Gen: No acute distress  HEENT: NCAT PERRL EOMI MMM  Neck: Supple, no LAD, no JVD  CVS:  S1 S2 normal, tachycardic, slightly irregular rhythm  Chest: Normal resp effort, lungs CTA b/L  Abd: +BS, non distended, soft, non tender, RUQ cholecystostomy drain site C/D/I, drainage bag contents bilious, non-bloody  Ext: No edema or calf tenderness  Skin: Warm, dry  Neuro: A+OX3, no deficits    Labs:                       9.4    6.64 )-----------( 275                 28.9       138  |  108  |  5  ---------------------< 132  4.0   |   23   |  0.50    Ca 7.8 (WNL when corrected for albumin)   Phos 2.8    Mg 2.5    TPro  5.4  /  Alb  2.0  /  TBili  0.3  /  DBili  0.1  /  AST  25  /  ALT  29  /  AlkPhos  81     Urinalysis 8/13: Clear, N-, LE-, RBC 3-5, WBC 0-2, bact occ    Micro:  Bile fluid culture 8/16: GPCs on Gram's stain, culture in process  Urine culture 8/13: Negative  Blood culture 8/13: Negative  Blood culture 8/13: Negative  Urine culture 8/12: Negative  Blood culture 8/11: Gram variable rods, still in process  Blood culture 8/11: Negative    Imaging:  NM HIDA scan 8/15: Prompt, homogeneous uptake of radiotracer by the hepatocytes. Activity is first seen in the bowel at 20 minutes. The gallbladder is not seen at any time during the study. There is good clearance of activity from the liver by the end of the study. Abnormal hepatobiliary scan. In the proper clinical setting, findings are consistent with acute cholecystitis.    Cardiac Testing:  (Prior admission)  Echo 8/12/22: The left ventricle is normal in size, wall motion, and contractility. Mild concentric left ventricular hypertrophy is present. Estimated left ventricular ejection fraction is 60-65 %. Normal appearing right ventricle structure and function. Mild aortic sclerosis is present with normal valvular opening. Mild to moderate aortic regurgitation is present. Mild mitral annular calcification is present. The mitral valve leaflets appear mildly thickened. Mild (1+) mitral regurgitation is present. The tricuspid valve leaflets are thin and pliable; valve motion is normal. Moderate (2+) eccentric tricuspid valve regurgitation is present. Moderate to severe pulmonary hypertension. Normal appearing pulmonic valve structure. Mild pulmonic valvular regurgitation (1+) is present. No evidence of pericardial effusion.    Procedures:  Cholecystostomy tube placement 8/16/22 by IR    Meds:  MEDICATIONS  (STANDING):  clopidogrel Tablet 75 milliGRAM(s) Oral daily  cyanocobalamin 1000 MICROGram(s) Oral daily  diltiazem    Tablet 30 milliGRAM(s) Oral every 6 hours  diltiazem Infusion 5 mG/Hr (5 mL/Hr) IV Continuous <Continuous>  heparin   Injectable 5000 Unit(s) SubCutaneous every 12 hours  insulin lispro (ADMELOG) corrective regimen sliding scale   SubCutaneous at bedtime  insulin lispro (ADMELOG) corrective regimen sliding scale   SubCutaneous three times a day before meals  metoprolol succinate ER 50 milliGRAM(s) Oral every 12 hours  piperacillin/tazobactam IVPB.. 3.375 Gram(s) IV Intermittent every 8 hours  polyethylene glycol 3350 17 Gram(s) Oral daily  senna 2 Tablet(s) Oral at bedtime  tamsulosin 0.4 milliGRAM(s) Oral at bedtime    MEDICATIONS  (PRN):  acetaminophen     Tablet .. 650 milliGRAM(s) Oral every 6 hours PRN Temp greater or equal to 38C (100.4F), Mild Pain (1 - 3)  aluminum hydroxide/magnesium hydroxide/simethicone Suspension 30 milliLiter(s) Oral every 4 hours PRN Dyspepsia  dextrose Oral Gel 15 Gram(s) Oral once PRN Blood Glucose LESS THAN 70 milliGRAM(s)/deciliter  HYDROmorphone  Injectable 0.5 milliGRAM(s) IV Push every 3 hours PRN Severe Pain (7 - 10)  melatonin 3 milliGRAM(s) Oral at bedtime PRN Insomnia  ondansetron Injectable 4 milliGRAM(s) IV Push once PRN Nausea and/or Vomiting  oxyCODONE    IR 5 milliGRAM(s) Oral every 6 hours PRN Moderate Pain (4 - 6)

## 2022-08-18 NOTE — PROVIDER CONTACT NOTE (OTHER) - BACKGROUND
Pt. was a transfer from 5S due to SVT to 140s.
Pt. was a transfer from [address] [place] [address] due to SVT.

## 2022-08-19 ENCOUNTER — TRANSCRIPTION ENCOUNTER (OUTPATIENT)
Age: 87
End: 2022-08-19

## 2022-08-19 LAB
ANION GAP SERPL CALC-SCNC: 8 MMOL/L — SIGNIFICANT CHANGE UP (ref 5–17)
BUN SERPL-MCNC: 5 MG/DL — LOW (ref 7–23)
CALCIUM SERPL-MCNC: 8.4 MG/DL — LOW (ref 8.5–10.1)
CHLORIDE SERPL-SCNC: 109 MMOL/L — HIGH (ref 96–108)
CO2 SERPL-SCNC: 22 MMOL/L — SIGNIFICANT CHANGE UP (ref 22–31)
CREAT SERPL-MCNC: 0.7 MG/DL — SIGNIFICANT CHANGE UP (ref 0.5–1.3)
EGFR: 88 ML/MIN/1.73M2 — SIGNIFICANT CHANGE UP
GLUCOSE SERPL-MCNC: 172 MG/DL — HIGH (ref 70–99)
HCT VFR BLD CALC: 32.4 % — LOW (ref 39–50)
HGB BLD-MCNC: 10.6 G/DL — LOW (ref 13–17)
MAGNESIUM SERPL-MCNC: 2.3 MG/DL — SIGNIFICANT CHANGE UP (ref 1.6–2.6)
MCHC RBC-ENTMCNC: 28 PG — SIGNIFICANT CHANGE UP (ref 27–34)
MCHC RBC-ENTMCNC: 32.7 GM/DL — SIGNIFICANT CHANGE UP (ref 32–36)
MCV RBC AUTO: 85.7 FL — SIGNIFICANT CHANGE UP (ref 80–100)
PHOSPHATE SERPL-MCNC: 3.3 MG/DL — SIGNIFICANT CHANGE UP (ref 2.5–4.5)
PLATELET # BLD AUTO: 351 K/UL — SIGNIFICANT CHANGE UP (ref 150–400)
POTASSIUM SERPL-MCNC: 4 MMOL/L — SIGNIFICANT CHANGE UP (ref 3.5–5.3)
POTASSIUM SERPL-SCNC: 4 MMOL/L — SIGNIFICANT CHANGE UP (ref 3.5–5.3)
RBC # BLD: 3.78 M/UL — LOW (ref 4.2–5.8)
RBC # FLD: 13.7 % — SIGNIFICANT CHANGE UP (ref 10.3–14.5)
SARS-COV-2 RNA SPEC QL NAA+PROBE: SIGNIFICANT CHANGE UP
SODIUM SERPL-SCNC: 139 MMOL/L — SIGNIFICANT CHANGE UP (ref 135–145)
WBC # BLD: 6.33 K/UL — SIGNIFICANT CHANGE UP (ref 3.8–10.5)
WBC # FLD AUTO: 6.33 K/UL — SIGNIFICANT CHANGE UP (ref 3.8–10.5)

## 2022-08-19 PROCEDURE — 99233 SBSQ HOSP IP/OBS HIGH 50: CPT

## 2022-08-19 PROCEDURE — 93010 ELECTROCARDIOGRAM REPORT: CPT

## 2022-08-19 RX ORDER — METFORMIN HYDROCHLORIDE 850 MG/1
1 TABLET ORAL
Qty: 0 | Refills: 0 | DISCHARGE

## 2022-08-19 RX ORDER — DILTIAZEM HCL 120 MG
5 CAPSULE, EXT RELEASE 24 HR ORAL
Qty: 0 | Refills: 0 | DISCHARGE
Start: 2022-08-19

## 2022-08-19 RX ORDER — PREGABALIN 225 MG/1
1 CAPSULE ORAL
Qty: 0 | Refills: 0 | DISCHARGE

## 2022-08-19 RX ORDER — PREGABALIN 225 MG/1
1 CAPSULE ORAL
Qty: 0 | Refills: 0 | DISCHARGE
Start: 2022-08-19

## 2022-08-19 RX ORDER — CARVEDILOL PHOSPHATE 80 MG/1
1 CAPSULE, EXTENDED RELEASE ORAL
Qty: 0 | Refills: 0 | DISCHARGE

## 2022-08-19 RX ORDER — AMIODARONE HYDROCHLORIDE 400 MG/1
TABLET ORAL
Refills: 0 | Status: DISCONTINUED | OUTPATIENT
Start: 2022-08-19 | End: 2022-08-19

## 2022-08-19 RX ORDER — PIPERACILLIN AND TAZOBACTAM 4; .5 G/20ML; G/20ML
3.38 INJECTION, POWDER, LYOPHILIZED, FOR SOLUTION INTRAVENOUS
Qty: 0 | Refills: 0 | DISCHARGE
Start: 2022-08-19

## 2022-08-19 RX ORDER — METOPROLOL TARTRATE 50 MG
1 TABLET ORAL
Qty: 0 | Refills: 0 | DISCHARGE
Start: 2022-08-19

## 2022-08-19 RX ORDER — OXYCODONE HYDROCHLORIDE 5 MG/1
1 TABLET ORAL
Qty: 0 | Refills: 0 | DISCHARGE
Start: 2022-08-19

## 2022-08-19 RX ORDER — AMIODARONE HYDROCHLORIDE 400 MG/1
400 TABLET ORAL EVERY 12 HOURS
Refills: 0 | Status: DISCONTINUED | OUTPATIENT
Start: 2022-08-19 | End: 2022-08-19

## 2022-08-19 RX ORDER — DILTIAZEM HCL 120 MG
1 CAPSULE, EXT RELEASE 24 HR ORAL
Qty: 0 | Refills: 0 | DISCHARGE
Start: 2022-08-19

## 2022-08-19 RX ORDER — POLYETHYLENE GLYCOL 3350 17 G/17G
17 POWDER, FOR SOLUTION ORAL
Qty: 0 | Refills: 0 | DISCHARGE
Start: 2022-08-19

## 2022-08-19 RX ORDER — INSULIN LISPRO 100/ML
1 VIAL (ML) SUBCUTANEOUS
Qty: 0 | Refills: 0 | DISCHARGE
Start: 2022-08-19

## 2022-08-19 RX ORDER — HYDROMORPHONE HYDROCHLORIDE 2 MG/ML
0.5 INJECTION INTRAMUSCULAR; INTRAVENOUS; SUBCUTANEOUS
Qty: 0 | Refills: 0 | DISCHARGE
Start: 2022-08-19

## 2022-08-19 RX ORDER — ATORVASTATIN CALCIUM 80 MG/1
10 TABLET, FILM COATED ORAL AT BEDTIME
Refills: 0 | Status: DISCONTINUED | OUTPATIENT
Start: 2022-08-19 | End: 2022-08-23

## 2022-08-19 RX ORDER — HEPARIN SODIUM 5000 [USP'U]/ML
5000 INJECTION INTRAVENOUS; SUBCUTANEOUS
Qty: 0 | Refills: 0 | DISCHARGE
Start: 2022-08-19

## 2022-08-19 RX ORDER — ACETAMINOPHEN 500 MG
2 TABLET ORAL
Qty: 0 | Refills: 0 | DISCHARGE
Start: 2022-08-19

## 2022-08-19 RX ORDER — SENNA PLUS 8.6 MG/1
2 TABLET ORAL
Qty: 0 | Refills: 0 | DISCHARGE
Start: 2022-08-19

## 2022-08-19 RX ADMIN — ATORVASTATIN CALCIUM 10 MILLIGRAM(S): 80 TABLET, FILM COATED ORAL at 22:25

## 2022-08-19 RX ADMIN — HEPARIN SODIUM 5000 UNIT(S): 5000 INJECTION INTRAVENOUS; SUBCUTANEOUS at 22:23

## 2022-08-19 RX ADMIN — CLOPIDOGREL BISULFATE 75 MILLIGRAM(S): 75 TABLET, FILM COATED ORAL at 09:19

## 2022-08-19 RX ADMIN — TAMSULOSIN HYDROCHLORIDE 0.4 MILLIGRAM(S): 0.4 CAPSULE ORAL at 22:24

## 2022-08-19 RX ADMIN — PIPERACILLIN AND TAZOBACTAM 25 GRAM(S): 4; .5 INJECTION, POWDER, LYOPHILIZED, FOR SOLUTION INTRAVENOUS at 05:52

## 2022-08-19 RX ADMIN — Medication 30 MILLIGRAM(S): at 12:58

## 2022-08-19 RX ADMIN — PIPERACILLIN AND TAZOBACTAM 25 GRAM(S): 4; .5 INJECTION, POWDER, LYOPHILIZED, FOR SOLUTION INTRAVENOUS at 14:42

## 2022-08-19 RX ADMIN — Medication 1: at 18:45

## 2022-08-19 RX ADMIN — PIPERACILLIN AND TAZOBACTAM 25 GRAM(S): 4; .5 INJECTION, POWDER, LYOPHILIZED, FOR SOLUTION INTRAVENOUS at 22:24

## 2022-08-19 RX ADMIN — Medication 30 MILLIGRAM(S): at 05:52

## 2022-08-19 RX ADMIN — PREGABALIN 1000 MICROGRAM(S): 225 CAPSULE ORAL at 09:31

## 2022-08-19 RX ADMIN — Medication 5 MG/HR: at 18:44

## 2022-08-19 RX ADMIN — Medication 30 MILLIGRAM(S): at 18:47

## 2022-08-19 RX ADMIN — Medication 50 MILLIGRAM(S): at 09:28

## 2022-08-19 RX ADMIN — HEPARIN SODIUM 5000 UNIT(S): 5000 INJECTION INTRAVENOUS; SUBCUTANEOUS at 09:28

## 2022-08-19 RX ADMIN — Medication 30 MILLIGRAM(S): at 23:41

## 2022-08-19 RX ADMIN — Medication 50 MILLIGRAM(S): at 22:23

## 2022-08-19 NOTE — PROGRESS NOTE ADULT - ASSESSMENT
89-year-old male with DM, HTN, HLD, CAD, BPH, prostate CA s/p rad was hospitalized on 8/11/22 and diagnosed with acute cholecystitis, declined interventions and was discharged on PO abx, called back for positive blood culture (Gram variable rods), placed on Zosyn and admitted to Medicine.     Acute cholecystitis  Lactate elevated on ED visit but not now. WBC elevated on admission. Bld Cx + for gram variable rods. Unclear if true bacteremia as (+) culture was one of two specimen on 8/11, could be contaminant though could be GI translocation. Repeat blood culture negative to date. HIDA 8/15 positive, suggestive of acute cholecystitis. Patient overall improved with Zosyn. Leukocytosis resolved. RUQ resolved. S/P cholecystostomy drain placement by IR 8/16.  - Continue with cholecystostomy drain, monitor output. Continue drain in-place upon discharge with plan for referral back to IR as outpatient for tube study in 6 weeks or so. IR outpatient [phone]. Dressing should be changed every 2-3 days or whenever soiled. No need to flush unless clogged. IR advised that patient should have referral for the tube study via Surgery Team that was following patient, that is, via Dr. Murray.   - Continue Zosyn, anticipate eventual transition to PO Augmentin to complete 14 days unless final cultures suggest otherwise  - Trend labs    Dysrhythmia  SVT upon admission, now with another narrow complex tachycardia. Consulted Cardiac EP. Started on Cardizem. Repleted K and Phos, now WNL. Etiology of his dysrhythmia a bit unclear. No signs of bleeding. Infection appears to be improving. Patient seen by EP Dr. Julian today, offered EP study/ablation and if not, anti arrhythmic drug. Patient however wishes to be transferred to [place]. Accepted by Dr Felix Johnson.   - Monitor lytes  - Continue metoprolol, Continue Cardizem drip, PO Cardizem with plan to titrate off drip if he converts    CAD, HTN, HLD  Elevated troponin likely demand ischemia in setting on infection. No regional wall motion abnormality on echo.   - Continue metoprolol, clopidogrel    DM2  A1c 6.6. Glucose 120s-130s today  - Monitor glucose  - Continue insulin    Hypovolemic hyponatremia  Resolved      Dispo: For transfer to [facility], accepted by Cardiology Dr. Felix Johnson   89-year-old male with DM, HTN, HLD, CAD, BPH, prostate CA s/p rad was hospitalized on 8/11/22 and diagnosed with acute cholecystitis, declined interventions and was discharged on PO abx, called back for positive blood culture (Gram variable rods), placed on Zosyn and admitted to Medicine.     Acute cholecystitis  Lactate elevated on ED visit but not now. WBC elevated on admission. Bld Cx + for gram variable rods. Unclear if true bacteremia as (+) culture was one of two specimen on 8/11, could be contaminant though could be GI translocation. Repeat blood culture negative to date. HIDA 8/15 positive, suggestive of acute cholecystitis. Patient overall improved with Zosyn. Leukocytosis resolved. RUQ resolved. S/P cholecystostomy drain placement by IR 8/16.  - Continue with cholecystostomy drain, monitor output. Continue drain in-place upon discharge with plan for referral back to IR as outpatient for tube study in 6 weeks or so. IR outpatient [phone]. Dressing should be changed every 2-3 days or whenever soiled. No need to flush unless clogged. IR advised that patient should have referral for the tube study via Surgery Team that was following patient, that is, via Dr. Murray.   - Continue Zosyn, anticipate eventual transition to PO Augmentin to complete 14 days unless final cultures suggest otherwise  - Trend labs    Dysrhythmia  SVT upon admission, now with another narrow complex tachycardia. Consulted Cardiac EP. Started on Cardizem. Repleted K and Phos, now WNL. Etiology of his dysrhythmia a bit unclear. No signs of bleeding. Infection appears to be improving. Patient seen by EP Dr. Julian today, offered EP study/ablation and if not, anti arrhythmic drug. Patient however wishes to be transferred to [place]. Accepted by Dr Felix Johnson.   - Monitor lytes  - Continue metoprolol, Continue Cardizem drip, PO Cardizem with plan to titrate off drip if he converts    Elevated troponin   As noted upon admission. Likely demand ischemia in setting on infection. No regional wall motion abnormality on echo. Ideally should have ischemic work up when stable, but patient has repeatedly refused  - Continue metoprolol, clopidogrel, statin  - Outpatient follow up with Cardiology    CAD, HTN, HLD  No angina or CHF sx. BP stable. Appears euvolemic.   - continue metoprolol, diltiazem, clopidogrel, statin    DM2  A1c 6.6. Glucose 120s-130s today  - Monitor glucose  - Continue insulin    Hypovolemic hyponatremia  Resolved      Dispo: For transfer to [facility], accepted by Cardiology Dr. Felix Johnson

## 2022-08-19 NOTE — DISCHARGE NOTE PROVIDER - CARE PROVIDER_API CALL
Interventional Radiology,   Interventional Radiology at [facility].  Outpatient office number is in this document.  Phone: (   )    -  Fax: (   )    -  Follow Up Time:     Edinson Patel)  Cardiovascular Disease; Internal Medicine  [address], [address]  [place] [address]  Phone: [phone]  Fax: [phone]  Follow Up Time: 2 weeks

## 2022-08-19 NOTE — DISCHARGE NOTE PROVIDER - PROVIDER TOKENS
FREE:[LAST:[Interventional Radiology],PHONE:[(   )    -],FAX:[(   )    -],ADDRESS:[Interventional Radiology at [facility].  Outpatient office number is in this document.]],PROVIDER:[TOKEN:[[id number]],FOLLOWUP:[2 weeks]]

## 2022-08-19 NOTE — PROGRESS NOTE ADULT - SUBJECTIVE AND OBJECTIVE BOX
Date of service: 22 @ 08:56    pt seen and examined  s/p perc cholecystostomy tube placement   denies abd discomfort  hr 130s, asymptomatic     ROS: no fever or chills; denies dizziness, no HA, no SOB or cough, no abdominal pain, no diarrhea or constipation; no dysuria, no urinary frequency, no legs pain, no rashes    MEDICATIONS  (STANDING):  aMIOdarone    Tablet   Oral   aMIOdarone    Tablet 400 milliGRAM(s) Oral every 12 hours  atorvastatin 10 milliGRAM(s) Oral at bedtime  clopidogrel Tablet 75 milliGRAM(s) Oral daily  cyanocobalamin 1000 MICROGram(s) Oral daily  dextrose 5%. 1000 milliLiter(s) (50 mL/Hr) IV Continuous <Continuous>  dextrose 5%. 1000 milliLiter(s) (100 mL/Hr) IV Continuous <Continuous>  dextrose 50% Injectable 25 Gram(s) IV Push once  dextrose 50% Injectable 12.5 Gram(s) IV Push once  dextrose 50% Injectable 25 Gram(s) IV Push once  diltiazem    Tablet 30 milliGRAM(s) Oral every 6 hours  diltiazem Infusion 5 mG/Hr (5 mL/Hr) IV Continuous <Continuous>  glucagon  Injectable 1 milliGRAM(s) IntraMuscular once  heparin   Injectable 5000 Unit(s) SubCutaneous every 12 hours  insulin lispro (ADMELOG) corrective regimen sliding scale   SubCutaneous at bedtime  insulin lispro (ADMELOG) corrective regimen sliding scale   SubCutaneous three times a day before meals  metoprolol succinate ER 50 milliGRAM(s) Oral every 12 hours  piperacillin/tazobactam IVPB.. 3.375 Gram(s) IV Intermittent every 8 hours  polyethylene glycol 3350 17 Gram(s) Oral daily  senna 2 Tablet(s) Oral at bedtime  tamsulosin 0.4 milliGRAM(s) Oral at bedtime    Vital Signs Last 24 Hrs  T(C): 36.6 (19 Aug 2022 08:40), Max: 36.8 (18 Aug 2022 20:30)  T(F): 97.8 (19 Aug 2022 08:40), Max: 98.2 (18 Aug 2022 20:30)  HR: 84 (19 Aug 2022 08:40) (54 - 132)  BP: 119/69 (19 Aug 2022 08:40) (119/69 - 147/103)  BP(mean): --  RR: 18 (19 Aug 2022 08:40) (18 - 18)  SpO2: 98% (19 Aug 2022 08:40) (94% - 98%)    Parameters below as of 19 Aug 2022 08:40  Patient On (Oxygen Delivery Method): room air    PE:  Constitutional: frail looking  HEENT: NC/AT, EOMI, PERRLA, conjunctivae clear; ears and nose atraumatic; pharynx benign  Neck: supple; thyroid not palpable  Back: no tenderness  Respiratory: respiratory effort normal; clear to auscultation  Cardiovascular: S1S2 regular, no murmurs  Abdomen: soft,  tender, not distended, positive BS; liver and spleen WNL, + cholecystostomy tube  Genitourinary: no suprapubic tenderness  Lymphatic: no LN palpable  Musculoskeletal: no muscle tenderness, no joint swelling or tenderness  Extremities: no pedal edema  Neurological/ Psychiatric: AxOx3, Judgement and insight normal;  moving all extremities  Skin: no rashes; no palpable lesions    Labs: all available labs reviewed                                              9.4    6.64  )-----------( 275      ( 18 Aug 2022 07:29 )             28.9     08-18    138  |  108  |  5<L>  ----------------------------<  132<H>  4.0   |  23  |  0.50    Ca    7.8<L>      18 Aug 2022 07:29  Phos  2.8     08-18  Mg     2.5     08-18        Urinalysis Basic - ( 13 Aug 2022 16:26 )    Color: Yellow / Appearance: Clear / S.020 / pH: x  Gluc: x / Ketone: Moderate  / Bili: Negative / Urobili: Negative   Blood: x / Protein: 100 / Nitrite: Negative   Leuk Esterase: Negative / RBC: 3-5 /HPF / WBC 0-2   Sq Epi: x / Non Sq Epi: Negative / Bacteria: Occasional    Culture - Blood in AM (08.15.22 @ 06:51)   Specimen Source: .Blood None   Culture Results:   No growth to date.   Culture - Urine (22 @ 01:00)   Specimen Source: Clean Catch None   Culture Results:   No growth   Culture - Blood (22 @ 23:04)   Specimen Source: .Blood None   Culture Results:   No growth to date.   Culture - Blood (22 @ 23:04)   Gram Stain:   Growth in anaerobic bottle: Gram Variable Rods   - Blood PCR Panel: NEG       Radiology: all available radiological tests reviewed    Advanced directives addressed: full resuscitation

## 2022-08-19 NOTE — DISCHARGE NOTE PROVIDER - NSDCCAREPROVSEEN_GEN_ALL_CORE_FT
Michele Negron (Interventional Radiology)  Minerva Nunez (Caridiology)  Dior Javier (Infectious Diseases)  Yasir Redmond (Surgery)  Ghanshyam Kaufman (Critical Care Medicine)  Jony Luna (Cardiology)  Roland Waters (Hospital Medicine)  Naomi Julian (Cardiac Electrophysiology) Michele Young MD (Hospital Medicine)  Michele Negron (Interventional Radiology)  Minerva Nunez (Caridiology)  Dior Javier (Infectious Diseases)  Yasir Redmond (Surgery)  Ghanshyam Kaufman (Critical Care Medicine)  Jony Luna (Cardiology)  Roland Waters ([facility] Medicine)  Naomi Julian (Cardiac Electrophysiology)  Paris Fields ([facility] Medicine)

## 2022-08-19 NOTE — DISCHARGE NOTE PROVIDER - NSDCMRMEDTOKEN_GEN_ALL_CORE_FT
acetaminophen 325 mg oral tablet: 2 tab(s) orally every 6 hours, As needed, Temp greater or equal to 38C (100.4F), Mild Pain (1 - 3)  calcium (as carbonate) 600 mg oral tablet: 1 tab(s) orally once a day  clopidogrel 75 mg oral tablet: 1 tab(s) orally once a day  cyanocobalamin 1000 mcg oral tablet: 1 tab(s) orally once a day  dilTIAZem: 5 mg/hr intravenous continuously, titrate as per protocl  dilTIAZem 30 mg oral tablet: 1 tab(s) orally every 6 hours  heparin: 5000 unit(s) subcutaneous every 12 hours  HYDROmorphone: 0.5 milligram(s) intravenous every 4 hours prn severe pain  insulin lispro 100 units/mL injectable solution: 1-10 unit(s) subcutaneous 4 times a day (before meals and at bedtime) per scale  metoprolol succinate 50 mg oral tablet, extended release: 1 tab(s) orally every 12 hours  Multiple Vitamins oral tablet: 1 tab(s) orally once a day  oxyCODONE 5 mg oral tablet: 1 tab(s) orally every 6 hours, As needed, Moderate Pain (4 - 6)  piperacillin-tazobactam: 3.375 gram(s) intravenous every 8 hours  polyethylene glycol 3350 oral powder for reconstitution: 17 gram(s) orally once a day  pravastatin 40 mg oral tablet: 1 tab(s) orally once a day  senna leaf extract oral tablet: 2 tab(s) orally once a day (at bedtime)  tamsulosin 0.4 mg oral capsule: 1 cap(s) orally once a day (at bedtime)   acetaminophen 325 mg oral tablet: 2 tab(s) orally every 6 hours, As needed, Temp greater or equal to 38C (100.4F), Mild Pain (1 - 3)  amoxicillin-clavulanate 875 mg-125 mg oral tablet: 1 tab(s) orally every 12 hours for 14 days  calcium (as carbonate) 600 mg oral tablet: 1 tab(s) orally once a day  clopidogrel 75 mg oral tablet: 1 tab(s) orally once a day  cyanocobalamin 1000 mcg oral tablet: 1 tab(s) orally once a day  dilTIAZem 120 mg/24 hours oral capsule, extended release: 1 cap(s) orally once a day  heparin: 5000 unit(s) subcutaneous every 12 hours  HYDROmorphone: 0.5 milligram(s) intravenous every 4 hours prn severe pain  insulin lispro 100 units/mL injectable solution: 1-10 unit(s) subcutaneous 4 times a day (before meals and at bedtime) per scale  lactobacillus acidophilus oral capsule: 1 tab(s) orally 2 times a day  metoprolol succinate 25 mg oral tablet, extended release: 1 tab(s) orally every 12 hours  Multiple Vitamins oral tablet: 1 tab(s) orally once a day  oxyCODONE 5 mg oral tablet: 1 tab(s) orally every 6 hours, As needed, Moderate Pain (4 - 6)  polyethylene glycol 3350 oral powder for reconstitution: 17 gram(s) orally once a day  pravastatin 40 mg oral tablet: 1 tab(s) orally once a day  senna leaf extract oral tablet: 2 tab(s) orally once a day (at bedtime)  tamsulosin 0.4 mg oral capsule: 1 cap(s) orally once a day (at bedtime)

## 2022-08-19 NOTE — PROGRESS NOTE ADULT - ASSESSMENT
89-year-old male with history of CAD, DM2, HTN, HLD, BPH,  s/p rad.Tx for prostate CA was hospitalized on 8/11/22 and diagnosed with acute cholecystitis.  The patient was a high risk for laparoscopic cholecystectomy because of his cardiac condition and cholecystostomy tube placement was offered.  The patient declined the procedure and was discharged home day prior to admit on Augmentin.  The patient was recalled to the emergency room because his blood culture grew gram variable rods.  The patient was started on IV Unasyn in the ER.  In the emergency room the patient developed sinus tachycardia with the elevated heart rate to 140.  Patient denies chest pain or abdominal pain nausea vomiting.     1. acute cholecystitis. bacteremia - gram variable rods  - blood 1 bottle 8/11 growing gram variable rods ? contaminant vs gi tract translocation f/u id  - repeat blood cx no growth 8/13, 8/15  - s/p perc cholecystostomy placement g/s gpc pairs f/u cx   - on IV zosyn #6  - continue with abx coverage  - po abx on dc - po augmentin 10-14 day course  - monitor temps  - tolerating abx well so far; no side effects noted  - reason for abx use and side effects reviewed with patient  - supportive care  - fu cbc    2. other issues - care per medicine

## 2022-08-19 NOTE — PROGRESS NOTE ADULT - SUBJECTIVE AND OBJECTIVE BOX
CHIEF COMPLAINT:  Presents because of +BCx from recent hospitalization    HPI: The patient is a 89-year-old male with history of CAD, DM2, HTN, HLD, BPH, prostate CA s/p rad was hospitalized on 22 and diagnosed with acute cholecystitis. He was recommended cholecystostomy because of his elevated cardiac risk and he refused. He was very upset about being made NPO as refused all treatments and wanted to go home. He was discharged home yesterday on Augmentin.  The patient was recalled to the emergency room because his blood culture grew gram variable rods.  The patient was started on IV Unasyn in the ER.  In the emergency room the patient developed sinus tachycardia/SVT with the elevated heart rate to 140. He was given coreg and the above antibiotics and SVT resolved.    22: Patient upset that he got coreg. He states he does not tolerate coreg because it causes him dizziness and only wants metoprolol. He also continues to refuse any procedures and just wanted to come to the hospital for the IV antibiotics. He is upset again that he is NPO.    He denies any fevers, chils, CP, palp, SOB, current abd pain, N/V, dizziness, syncope, orthopnea, PND    8/15/22 SVT on monitor,, patient offers no complaints    22 s/o IR GB drain placement, intermittent SVT    22  back in SVT despite metoprolol and cardizem    PMHx:  PAST MEDICAL & SURGICAL HISTORY:  BPH (benign prostatic hyperplasia)  HLD (hyperlipidemia)  GERD (gastroesophageal reflux disease)  Prostate cancer s/p radiation  CAD S/P percutaneous coronary angioplasty 2014  DM2  Pulm HTN    Social History:  Former smoker  Denies any significant etoh or any illicit drug use    FAMILY HISTORY:   FAMILY HISTORY:  Family history of MI (myocardial infarction) (Father)  Family history of uterine cancer (Mother)    ALLERGIES:  Allergies  No Known Allergies      Vital Signs Last 24 Hrs  T(C): 36.8 (18 Aug 2022 20:30), Max: 36.8 (18 Aug 2022 20:30)  T(F): 98.2 (18 Aug 2022 20:30), Max: 98.2 (18 Aug 2022 20:30)  HR: 132 (19 Aug 2022 05:46) (54 - 132)  BP: 147/103 (19 Aug 2022 05:46) (124/100 - 153/82)  BP(mean): --  RR: 18 (19 Aug 2022 00:30) (18 - 18)  SpO2: 97% (19 Aug 2022 05:46) (94% - 98%)    Parameters below as of 19 Aug 2022 05:46  Patient On (Oxygen Delivery Method): room air          PHYSICAL EXAM:   Constitutional: awake and alert in NAD  HEENT: EOMI, Normal Hearing, MMM  Neck: Soft and supple, No LAD, mild + JVD, no carotid bruit  Respiratory: Breath sounds are clear bilaterally, No wheezing, rales or rhonchi, good air movement  Cardiovascular: S1 and S2,tachy , soft systolic murmur at LSB. PMI is nondisplaced  Gastrointestinal: perc-lor drain  Extremities: Warm and well perfused, no peripheral edema  Neurological: A/O x 3, no focal deficits appreciated  Skin: No rashes appreciated     MEDICATIONS  (STANDING):  aMIOdarone    Tablet   Oral   clopidogrel Tablet 75 milliGRAM(s) Oral daily  cyanocobalamin 1000 MICROGram(s) Oral daily  dextrose 5%. 1000 milliLiter(s) (50 mL/Hr) IV Continuous <Continuous>  dextrose 5%. 1000 milliLiter(s) (100 mL/Hr) IV Continuous <Continuous>  dextrose 50% Injectable 25 Gram(s) IV Push once  dextrose 50% Injectable 12.5 Gram(s) IV Push once  dextrose 50% Injectable 25 Gram(s) IV Push once  diltiazem    Tablet 30 milliGRAM(s) Oral every 6 hours  diltiazem Infusion 5 mG/Hr (5 mL/Hr) IV Continuous <Continuous>  glucagon  Injectable 1 milliGRAM(s) IntraMuscular once  heparin   Injectable 5000 Unit(s) SubCutaneous every 12 hours  insulin lispro (ADMELOG) corrective regimen sliding scale   SubCutaneous at bedtime  insulin lispro (ADMELOG) corrective regimen sliding scale   SubCutaneous three times a day before meals  metoprolol succinate ER 50 milliGRAM(s) Oral every 12 hours  piperacillin/tazobactam IVPB.. 3.375 Gram(s) IV Intermittent every 8 hours  polyethylene glycol 3350 17 Gram(s) Oral daily  senna 2 Tablet(s) Oral at bedtime  tamsulosin 0.4 milliGRAM(s) Oral at bedtime    MEDICATIONS  (PRN):  acetaminophen     Tablet .. 650 milliGRAM(s) Oral every 6 hours PRN Temp greater or equal to 38C (100.4F), Mild Pain (1 - 3)  aluminum hydroxide/magnesium hydroxide/simethicone Suspension 30 milliLiter(s) Oral every 4 hours PRN Dyspepsia  dextrose Oral Gel 15 Gram(s) Oral once PRN Blood Glucose LESS THAN 70 milliGRAM(s)/deciliter  HYDROmorphone  Injectable 0.5 milliGRAM(s) IV Push every 3 hours PRN Severe Pain (7 - 10)  melatonin 3 milliGRAM(s) Oral at bedtime PRN Insomnia  ondansetron Injectable 4 milliGRAM(s) IV Push once PRN Nausea and/or Vomiting  oxyCODONE    IR 5 milliGRAM(s) Oral every 6 hours PRN Moderate Pain (4 - 6)        08-18    138  |  108  |  5<L>  ----------------------------<  132<H>  4.0   |  23  |  0.50    Ca    7.8<L>      18 Aug 2022 07:29  Phos  2.8     08-18  Mg     2.5     08-18    TPro  5.4<L>  /  Alb  2.0<L>  /  TBili  0.3  /  DBili  0.1  /  AST  25  /  ALT  29  /  AlkPhos  81  08-17                        9.4    6.64  )-----------( 275      ( 18 Aug 2022 07:29 )             28.9             LABS: All Labs Reviewed:                   BLOOD CULTURES: Organism --  Gram Stain Blood -- Gram Stain --  Specimen Source Clean Catch None  Culture-Blood --    Organism Blood Culture PCR  Gram Stain Blood -- Gram Stain   Growth in anaerobic bottle: Gram Variable Rods  Specimen Source .Blood None  Culture-Blood --    RADIOLOGY:    Reviewed in EMR    EK/13/22, my interpretation: NSR low voltage nonspecific ST changes     TELEMETRY:    Reviewed. SVT alt with SR    ECHO:    ACC: [id number] EXAM:  ECHO TTE WO CON COMP W DOPP                        PROCEDURE DATE:  2022      Indications   1) Z01.810 Encounter for preprocedural cardiovascular examination    M-Mode Measurements (cm)     LVEDd: 4.8 cm          LVESd: 3.02 cm   IVSEd: 1.23 cm   LVPWd: 1.13 cm            AO Root Dimension: 3.5 cm                             ACS: 1.9 cm                             LA: 3.8 cm    Doppler Measurements:     AV Velocity:146 cm/s               MV Peak E-Wave: 121 cm/s   AV Peak Gradient: 8.53 mmHg        MV Peak A-Wave: 122 cm/s                                      MV E/A Ratio: 0.99 %   TR Velocity:383 cm/s               MV Peak Gradient: 5.86 mmHg   TR Gradient:58.6756 mmHg   Estimated RAP:8 mmHg   RVSP:67 mmHg     Summary     The left ventricle is normal in size, wall motion, and contractility.   Mild concentric left ventricular hypertrophy is present.   Estimated left ventricular ejection fraction is 60-65 %.   Normal appearing right ventricle structure and function.   Mild aortic sclerosis is present with normal valvular opening.   Mild to moderate aortic regurgitation is present.   Mild mitral annular calcification is present.   The mitral valve leaflets appear mildly thickened.   Mild (1+) mitral regurgitation is present.   The tricuspid valve leaflets are thin and pliable; valve motion is normal.   Moderate (2+) eccentric tricuspid valve regurgitation is present.   Moderate to severe pulmonary hypertension.   Normal appearing pulmonic valve structure.   Mild pulmonic valvular regurgitation (1+) is present.   No evidence of pericardial effusion.

## 2022-08-19 NOTE — PROGRESS NOTE ADULT - ASSESSMENT
89 yo M with above PMHx who presents was recently admitted for acute cholecystitis refusing cholecystostomy who presents because of positive BCx. Course complicated by SVT    -SVT intermittnet - likely from infection   increased metoprolol to 50 TID from BID-- it was then decreased back to BID, cardizem added but stll with SVT-- will add BID  amio with the hope that  SVT will be suppressed-- if he is to stay on amio long term will need monitering for toxicities     s/p IR drain placement for GB-- on abx    CAD - ( hx of) now with Minimal elevation in CE,  likely due to physiologic strain - continue plavix recommend statin ( was on lipitor 10)     Ideally should have ischemic work up when stable , but patient has repeated refused       will continue to follow

## 2022-08-19 NOTE — PROGRESS NOTE ADULT - SUBJECTIVE AND OBJECTIVE BOX
Chief Complaint: Called back for positive blood cultures    Interval Hx: Patient feeling well today. No complaints. No fever or chills. No dizziness, lightheadedness, chest pain, or palpitations. No abdominal pain, nausea, vomiting, diarrhea or constipation. Tolerating PO well. Eager to return home soon. However, he has ongoing narrow complex tachycardia, does break with Cardizem drip but returns back into dysrhythmia once trip turned off despite metoprolol and PO diltiazem on board. EP discussed with patient and daughter. Recommended EP study/ablation and if declined, advised anti arrhythmic drug. Patient/daughter requested to be transferred to [place] for further care. Accepted by Cardiology Dr. Johnson. Transfer center aware.     ROS: All other systems reviewed and found to be negative with the exception of some physical deconditioning    Exam:  Afebrile  /60  HR: 61  RR 18  O2 97% on RA  Gen: No acute distress  HEENT: NCAT PERRL EOMI MMM  Neck: Supple, no LAD, no JVD  CVS:  S1 S2 normal, tachycardic, slightly irregular rhythm  Chest: Normal resp effort, lungs CTA b/L  Abd: +BS, non distended, soft, non tender, RUQ cholecystostomy drain site C/D/I, drainage bag contents bilious, non-bloody  Ext: No edema or calf tenderness  Skin: Warm, dry  Neuro: A+OX3, no deficits    Labs:                        10.6   6.33 )-----------( 351             32.4       139  |  109  |  5  ---------------------<  172  4.0   |   22   |  0.70    Ca 8.4 (WNL when corrected for albumin)   Phos 3.3   Mg 2.3    TPro  5.4  /  Alb  2.0  /  TBili  0.3  /  DBili  0.1  /  AST  25  /  ALT  29  /  AlkPhos  81     Urinalysis 8/13: Clear, N-, LE-, RBC 3-5, WBC 0-2, bact occ    Micro:  Bile fluid culture 8/16: GPCs on Gram's stain, culture in process  Urine culture 8/13: Negative  Blood culture 8/13: Negative  Blood culture 8/13: Negative  Urine culture 8/12: Negative  Blood culture 8/11: Gram variable rods, still in process  Blood culture 8/11: Negative    Imaging:  NM HIDA scan 8/15: Prompt, homogeneous uptake of radiotracer by the hepatocytes. Activity is first seen in the bowel at 20 minutes. The gallbladder is not seen at any time during the study. There is good clearance of activity from the liver by the end of the study. Abnormal hepatobiliary scan. In the proper clinical setting, findings are consistent with acute cholecystitis.    Cardiac Testing:  (Prior admission)  Echo 8/12/22: The left ventricle is normal in size, wall motion, and contractility. Mild concentric left ventricular hypertrophy is present. Estimated left ventricular ejection fraction is 60-65 %. Normal appearing right ventricle structure and function. Mild aortic sclerosis is present with normal valvular opening. Mild to moderate aortic regurgitation is present. Mild mitral annular calcification is present. The mitral valve leaflets appear mildly thickened. Mild (1+) mitral regurgitation is present. The tricuspid valve leaflets are thin and pliable; valve motion is normal. Moderate (2+) eccentric tricuspid valve regurgitation is present. Moderate to severe pulmonary hypertension. Normal appearing pulmonic valve structure. Mild pulmonic valvular regurgitation (1+) is present. No evidence of pericardial effusion.    Procedures:  Cholecystostomy tube placement 8/16/22 by IR    Meds:  MEDICATIONS  (STANDING):  clopidogrel Tablet 75 milliGRAM(s) Oral daily  cyanocobalamin 1000 MICROGram(s) Oral daily  diltiazem    Tablet 30 milliGRAM(s) Oral every 6 hours  diltiazem Infusion 5 mG/Hr (5 mL/Hr) IV Continuous <Continuous>  heparin   Injectable 5000 Unit(s) SubCutaneous every 12 hours  insulin lispro (ADMELOG) corrective regimen sliding scale   SubCutaneous at bedtime  insulin lispro (ADMELOG) corrective regimen sliding scale   SubCutaneous three times a day before meals  metoprolol succinate ER 50 milliGRAM(s) Oral every 12 hours  piperacillin/tazobactam IVPB.. 3.375 Gram(s) IV Intermittent every 8 hours  polyethylene glycol 3350 17 Gram(s) Oral daily  senna 2 Tablet(s) Oral at bedtime  tamsulosin 0.4 milliGRAM(s) Oral at bedtime    MEDICATIONS  (PRN):  acetaminophen     Tablet .. 650 milliGRAM(s) Oral every 6 hours PRN Temp greater or equal to 38C (100.4F), Mild Pain (1 - 3)  aluminum hydroxide/magnesium hydroxide/simethicone Suspension 30 milliLiter(s) Oral every 4 hours PRN Dyspepsia  dextrose Oral Gel 15 Gram(s) Oral once PRN Blood Glucose LESS THAN 70 milliGRAM(s)/deciliter  HYDROmorphone  Injectable 0.5 milliGRAM(s) IV Push every 3 hours PRN Severe Pain (7 - 10)  melatonin 3 milliGRAM(s) Oral at bedtime PRN Insomnia  ondansetron Injectable 4 milliGRAM(s) IV Push once PRN Nausea and/or Vomiting  oxyCODONE    IR 5 milliGRAM(s) Oral every 6 hours PRN Moderate Pain (4 - 6)

## 2022-08-19 NOTE — DISCHARGE NOTE PROVIDER - NSDCCPCAREPLAN_GEN_ALL_CORE_FT
PRINCIPAL DISCHARGE DIAGNOSIS  Diagnosis: Acute cholecystitis  Assessment and Plan of Treatment: You were admitted to the hospital for acute cholecystitis. Blood culture 8/11 was (+) for Gram-variable rods. Unclear if true bacteremia as (+) culture was one of two specimen on 8/11, could be contaminant though could be GI translocation. Repeat blood culture have been negative to date. HIDA on 8/15 was positive, suggestive of acute cholecystitis. You had at that point improved overall with Zosyn. Leukocytosis resolved. RUQ pain improved. For source control, you received cholecystostomy drain placement by IR 8/16.  - Continue with cholecystostomy drain, monitor output. Continue drain in-place upon discharge with plan for referral back to IR as outpatient for tube study in 6 weeks or so. IR outpatient [phone]. Dressing should be changed every 2-3 days or whenever soiled. No need to flush unless clogged. IR advised that patient should have referral for the tube study via Surgery Team that was following patient, that is, via Dr. Murray.   - Continue Zosyn, anticipate eventual transition to PO Augmentin to complete 14 days unless final cultures suggest otherwise  - Trend labs      SECONDARY DISCHARGE DIAGNOSES  Diagnosis: SVT (supraventricular tachycardia)  Assessment and Plan of Treatment: You had SVT upon admission, now with continued narrow complex tachycardia. Started on Cardizem. Repleted K and Phos, now WNL. No signs of bleeding. Infection appears to be improving. Patient seen by EP Dr. Julian today, offered EP study/ablation and if not, anti arrhythmic drug. Patient however wishes to be transferred to [place]. Accepted by Dr Felix Johnson.   - Monitor lytes  - Continue metoprolol, Continue Cardizem drip, PO Cardizem with plan to titrate off drip if he converts     PRINCIPAL DISCHARGE DIAGNOSIS  Diagnosis: Acute cholecystitis  Assessment and Plan of Treatment: You were admitted to the hospital for acute cholecystitis. Blood culture 8/11 was (+) for Gram-variable rods. Unclear if true bacteremia as (+) culture was one of two specimen on 8/11, could be contaminant though could be GI translocation. Repeat blood culture have been negative to date. HIDA on 8/15 was positive, suggestive of acute cholecystitis. You had at that point improved overall with Zosyn. Leukocytosis resolved. RUQ pain improved. For source control, you received cholecystostomy drain placement by IR 8/16.  - continue pop augmentin as prescribed for 14 days as recommended by DERREK Javier   - Continue with cholecystostomy drain, monitor output. Continue drain in-place upon discharge with plan for referral back to IR as outpatient for tube study in 6 weeks or so. IR outpatient [phone]. Dressing should be changed every 2-3 days or whenever soiled. No need to flush unless clogged. IR advised that patient should have referral for the tube study via Surgery Team that was following patient, that is, via Dr. Murray.   - Trend labs      SECONDARY DISCHARGE DIAGNOSES  Diagnosis: SVT (supraventricular tachycardia)  Assessment and Plan of Treatment: You had SVT upon admission, now with continued narrow complex tachycardia. Started on Cardizem. Repleted K and Phos, now WNL. No signs of bleeding. Infection appears to be improving. Patient seen by EP Dr. Julian, offered EP study/ablation and if not, anti arrhythmic drug. Patient however wishes to be transferred to [place]. Accepted by Dr Felix Johnson.   - Monitor lytes  - Continue metoprolol, Continue  PO

## 2022-08-19 NOTE — DISCHARGE NOTE PROVIDER - HOSPITAL COURSE
89-year-old male with DM, HTN, HLD, CAD, BPH, prostate CA s/p rad was hospitalized on 8/11/22 and diagnosed with acute cholecystitis, declined interventions and was discharged on PO abx, called back for positive blood culture (Gram variable rods), placed on Zosyn and admitted to Medicine.     Acute cholecystitis  Lactate elevated on ED visit but not now. WBC elevated on admission. Bld Cx + for gram variable rods. Unclear if true bacteremia as (+) culture was one of two specimen on 8/11, could be contaminant though could be GI translocation. Repeat blood culture negative to date. HIDA 8/15 positive, suggestive of acute cholecystitis. Patient overall improved with Zosyn. Leukocytosis resolved. RUQ resolved. S/P cholecystostomy drain placement by IR 8/16.  - Continue with cholecystostomy drain, monitor output. Continue drain in-place upon discharge with plan for referral back to IR as outpatient for tube study in 6 weeks or so. IR outpatient [phone]. Dressing should be changed every 2-3 days or whenever soiled. No need to flush unless clogged. IR advised that patient should have referral for the tube study via Surgery Team that was following patient, that is, via Dr. Murray.   - Continue Zosyn, anticipate eventual transition to PO Augmentin to complete 14 days unless final cultures suggest otherwise  - Trend labs    Dysrhythmia  SVT upon admission, now with another narrow complex tachycardia. Consulted Cardiac EP. Started on Cardizem. Repleted K and Phos, now WNL. Etiology of his dysrhythmia a bit unclear. No signs of bleeding. Infection appears to be improving. Patient seen by EP Dr. Julian today, offered EP study/ablation and if not, anti arrhythmic drug. Patient however wishes to be transferred to [place]. Accepted by Dr Felix Johnson.   - Monitor lytes  - Continue metoprolol, Continue Cardizem drip, PO Cardizem with plan to titrate off drip if he converts    CAD, HTN, HLD  Elevated troponin likely demand ischemia in setting on infection. No regional wall motion abnormality on echo.   - Continue metoprolol, clopidogrel    DM2  A1c 6.6. Glucose 120s-130s today  - Monitor glucose  - Continue insulin    Hypovolemic hyponatremia  Resolved      Discharge Exam:  Afebrile  /60  HR: 61  RR 18  O2 97% on RA  Gen: No acute distress  HEENT: NCAT PERRL EOMI MMM  Neck: Supple, no LAD, no JVD  CVS:  S1 S2 normal, tachycardic, slightly irregular rhythm  Chest: Normal resp effort, lungs CTA b/L  Abd: +BS, non distended, soft, non tender, RUQ cholecystostomy drain site C/D/I, drainage bag contents bilious, non-bloody  Ext: No edema or calf tenderness  Skin: Warm, dry  Neuro: A+OX3, no deficits    Labs:                        10.6   6.33 )-----------( 351             32.4       139  |  109  |  5  ---------------------<  172  4.0   |   22   |  0.70    Ca 8.4 (WNL when corrected for albumin)   Phos 3.3   Mg 2.3    TPro  5.4  /  Alb  2.0  /  TBili  0.3  /  DBili  0.1  /  AST  25  /  ALT  29  /  AlkPhos  81     Urinalysis 8/13: Clear, N-, LE-, RBC 3-5, WBC 0-2, bact occ    Micro:  Bile fluid culture 8/16: GPCs on Gram's stain, culture in process  Urine culture 8/13: Negative  Blood culture 8/13: Negative  Blood culture 8/13: Negative  Urine culture 8/12: Negative  Blood culture 8/11: Gram variable rods, still in process  Blood culture 8/11: Negative    Imaging:  NM HIDA scan 8/15: Prompt, homogeneous uptake of radiotracer by the hepatocytes. Activity is first seen in the bowel at 20 minutes. The gallbladder is not seen at any time during the study. There is good clearance of activity from the liver by the end of the study. Abnormal hepatobiliary scan. In the proper clinical setting, findings are consistent with acute cholecystitis.    Cardiac Testing:  (Prior admission)  Echo 8/12/22: The left ventricle is normal in size, wall motion, and contractility. Mild concentric left ventricular hypertrophy is present. Estimated left ventricular ejection fraction is 60-65 %. Normal appearing right ventricle structure and function. Mild aortic sclerosis is present with normal valvular opening. Mild to moderate aortic regurgitation is present. Mild mitral annular calcification is present. The mitral valve leaflets appear mildly thickened. Mild (1+) mitral regurgitation is present. The tricuspid valve leaflets are thin and pliable; valve motion is normal. Moderate (2+) eccentric tricuspid valve regurgitation is present. Moderate to severe pulmonary hypertension. Normal appearing pulmonic valve structure. Mild pulmonic valvular regurgitation (1+) is present. No evidence of pericardial effusion.    Procedures:  Cholecystostomy tube placement 8/16/22 by RUSSELL   89-year-old male with DM, HTN, HLD, CAD, BPH, prostate CA s/p rad was hospitalized on 8/11/22 and diagnosed with acute cholecystitis, declined interventions and was discharged on PO abx, called back for positive blood culture (Gram variable rods), placed on Zosyn and admitted to Medicine.     Acute cholecystitis  Lactate elevated on ED visit but not now. WBC elevated on admission. Bld Cx + for gram variable rods. Unclear if true bacteremia as (+) culture was one of two specimen on 8/11, could be contaminant though could be GI translocation. Repeat blood culture negative to date. HIDA 8/15 positive, suggestive of acute cholecystitis. Patient overall improved with Zosyn. Leukocytosis resolved. RUQ resolved. S/P cholecystostomy drain placement by IR 8/16.  - Continue with cholecystostomy drain, monitor output. Continue drain in-place upon discharge with plan for referral back to IR as outpatient for tube study in 6 weeks or so. IR outpatient [phone]. Dressing should be changed every 2-3 days or whenever soiled. No need to flush unless clogged. IR advised that patient should have referral for the tube study via Surgery Team that was following patient, that is, via Dr. Murray.   - Continue Zosyn, anticipate eventual transition to PO Augmentin to complete 14 days unless final cultures suggest otherwise  - Trend labs    Dysrhythmia  SVT upon admission, now with another narrow complex tachycardia. Consulted Cardiac EP. Started on Cardizem. Repleted K and Phos, now WNL. Etiology of his dysrhythmia a bit unclear. No signs of bleeding. Infection appears to be improving. Patient seen by EP Dr. Julian today, offered EP study/ablation and if not, anti arrhythmic drug. Patient however wishes to be transferred to [place]. Accepted by Dr Felix Johnson.   - Monitor lytes  - Continue metoprolol, Continue Cardizem drip, PO Cardizem with plan to titrate off drip if he converts    Elevated troponin   As noted upon admission. Likely demand ischemia in setting on infection. No regional wall motion abnormality on echo. Ideally should have ischemic work up when stable, but patient has repeatedly refused  - Continue metoprolol, clopidogrel, statin  - Outpatient follow up with Cardiology    CAD, HTN, HLD  No angina or CHF sx. BP stable. Appears euvolemic.   - continue metoprolol, diltiazem, clopidogrel, statin    DM2  A1c 6.6. Glucose 120s-130s today  - Monitor glucose  - Continue insulin    Hypovolemic hyponatremia  Resolved      Discharge Exam:  Afebrile  /60  HR: 61  RR 18  O2 97% on RA  Gen: No acute distress  HEENT: NCAT PERRL EOMI MMM  Neck: Supple, no LAD, no JVD  CVS:  S1 S2 normal, tachycardic, slightly irregular rhythm  Chest: Normal resp effort, lungs CTA b/L  Abd: +BS, non distended, soft, non tender, RUQ cholecystostomy drain site C/D/I, drainage bag contents bilious, non-bloody  Ext: No edema or calf tenderness  Skin: Warm, dry  Neuro: A+OX3, no deficits    Labs:                        10.6   6.33 )-----------( 351             32.4       139  |  109  |  5  ---------------------<  172  4.0   |   22   |  0.70    Ca 8.4 (WNL when corrected for albumin)   Phos 3.3   Mg 2.3    TPro  5.4  /  Alb  2.0  /  TBili  0.3  /  DBili  0.1  /  AST  25  /  ALT  29  /  AlkPhos  81     Urinalysis 8/13: Clear, N-, LE-, RBC 3-5, WBC 0-2, bact occ    Micro:  Bile fluid culture 8/16: GPCs on Gram's stain, culture in process  Urine culture 8/13: Negative  Blood culture 8/13: Negative  Blood culture 8/13: Negative  Urine culture 8/12: Negative  Blood culture 8/11: Gram variable rods, still in process  Blood culture 8/11: Negative    Imaging:  NM HIDA scan 8/15: Prompt, homogeneous uptake of radiotracer by the hepatocytes. Activity is first seen in the bowel at 20 minutes. The gallbladder is not seen at any time during the study. There is good clearance of activity from the liver by the end of the study. Abnormal hepatobiliary scan. In the proper clinical setting, findings are consistent with acute cholecystitis.    Cardiac Testing:  (Prior admission)  Echo 8/12/22: The left ventricle is normal in size, wall motion, and contractility. Mild concentric left ventricular hypertrophy is present. Estimated left ventricular ejection fraction is 60-65 %. Normal appearing right ventricle structure and function. Mild aortic sclerosis is present with normal valvular opening. Mild to moderate aortic regurgitation is present. Mild mitral annular calcification is present. The mitral valve leaflets appear mildly thickened. Mild (1+) mitral regurgitation is present. The tricuspid valve leaflets are thin and pliable; valve motion is normal. Moderate (2+) eccentric tricuspid valve regurgitation is present. Moderate to severe pulmonary hypertension. Normal appearing pulmonic valve structure. Mild pulmonic valvular regurgitation (1+) is present. No evidence of pericardial effusion.    Procedures:  Cholecystostomy tube placement 8/16/22 by IR   89-year-old male with DM, HTN, HLD, CAD, BPH, prostate CA s/p rad was hospitalized on 8/11/22 and diagnosed with acute cholecystitis, declined interventions and was discharged on PO abx, called back for positive blood culture (Gram variable rods), placed on Zosyn and admitted to Medicine.     Sepsis due to acute cholecystitis  Resolved. Patient called back to hospital for Bld Cx 1 of 2 specimens 8/11 (+) for gram variable rods, unclear if true bacteremia.   Patient was placed on Zosyn empirically. Further workup with HIDA scan 8/15 indicated acute cholecystitis. Patient underwent cholecystostomy drain placement by IR 8/16.  Patient is now markedly improved. No pain. No fever. Tolerating regular diet. Leukocytosis resolved. Repeat blood cultures negative to date.   - Finsihed couruse of IV Zosyn - started on  PO Augmentin for 14 days - D/W Dr. Javier   - Continue with cholecystostomy drain, monitor output. Plan to keep drain in-place upon discharge with patient to return to IR as outpatient for tube study in 6 weeks or so.   IR outpatient [phone]  Dressing should be changed every 2-3 days or whenever soiled.   No need to flush unless clogged.   IR advised that patient should have referral for the tube study via Surgery Team that was following patient, that is, via Dr. Murray.     Intermittent episodes of SVT  - s/p cardizem drip. Patient nonetheless continued to have bursts of 's - got cardizem 10 mg ivp today 08/22 with resolution of SVTs  - Continue PO metoprolol, PO Cardizem PO  - Monitor lytes  - Continue to monitor on tele  - For transfer to [place] 8/22 (Dr. Johnson) - pt. want sto go home if no bed available     Elevated troponin   As noted upon admission. Likely demand ischemia in setting on infection. No regional wall motion abnormality on echo. I  deally should have ischemic work up when stable, but patient has repeatedly refused  - Continue metoprolol, clopidogrel, statin  - Outpatient follow up with Cardiology    CAD, HTN, HLD  No angina or CHF sx. BP stable. Appears euvolemic.   - continue metoprolol, diltiazem, clopidogrel, statin    DM2  A1c 6.6. Glucose 130s  - Monitor glucose  - Continue insulin    Pt. is stable for [facility] to [facility]    Discharge Exam:  Vital Signs Last 24 Hrs  T(C): 36.9 (22 Aug 2022 08:38), Max: 36.9 (22 Aug 2022 08:38)  T(F): 98.5 (22 Aug 2022 08:38), Max: 98.5 (22 Aug 2022 08:38)  HR: 63 (22 Aug 2022 14:36) (55 - 140)  BP: 119/67 (22 Aug 2022 14:22) (102/71 - 142/80)  BP(mean): --  RR: 19 (22 Aug 2022 08:38) (18 - 19)  SpO2: 96% (22 Aug 2022 08:38) (95% - 96%)  Parameters below as of 22 Aug 2022 08:38  Patient On (Oxygen Delivery Method): room air  Gen: No acute distress  HEENT: NCAT PERRL EOMI MMM  Neck: Supple, no LAD, no JVD  CVS:  S1 S2 normal, tachycardic, slightly irregular rhythm  Chest: Normal resp effort, lungs CTA b/L  Abd: +BS, non distended, soft, non tender, RUQ cholecystostomy drain site C/D/I, drainage bag contents bilious, non-bloody  Ext: No edema or calf tenderness  Skin: Warm, dry  Neuro: A+OX3, no deficits

## 2022-08-20 LAB
CULTURE RESULTS: SIGNIFICANT CHANGE UP
SPECIMEN SOURCE: SIGNIFICANT CHANGE UP

## 2022-08-20 PROCEDURE — 99232 SBSQ HOSP IP/OBS MODERATE 35: CPT

## 2022-08-20 RX ORDER — METOPROLOL TARTRATE 50 MG
25 TABLET ORAL EVERY 12 HOURS
Refills: 0 | Status: DISCONTINUED | OUTPATIENT
Start: 2022-08-20 | End: 2022-08-23

## 2022-08-20 RX ADMIN — CLOPIDOGREL BISULFATE 75 MILLIGRAM(S): 75 TABLET, FILM COATED ORAL at 09:14

## 2022-08-20 RX ADMIN — HEPARIN SODIUM 5000 UNIT(S): 5000 INJECTION INTRAVENOUS; SUBCUTANEOUS at 21:45

## 2022-08-20 RX ADMIN — Medication 1: at 17:40

## 2022-08-20 RX ADMIN — Medication 3 MILLIGRAM(S): at 21:46

## 2022-08-20 RX ADMIN — Medication 50 MILLIGRAM(S): at 09:14

## 2022-08-20 RX ADMIN — HEPARIN SODIUM 5000 UNIT(S): 5000 INJECTION INTRAVENOUS; SUBCUTANEOUS at 09:14

## 2022-08-20 RX ADMIN — Medication 30 MILLIGRAM(S): at 17:41

## 2022-08-20 RX ADMIN — SENNA PLUS 2 TABLET(S): 8.6 TABLET ORAL at 21:46

## 2022-08-20 RX ADMIN — PIPERACILLIN AND TAZOBACTAM 25 GRAM(S): 4; .5 INJECTION, POWDER, LYOPHILIZED, FOR SOLUTION INTRAVENOUS at 14:44

## 2022-08-20 RX ADMIN — Medication 30 MILLIGRAM(S): at 06:37

## 2022-08-20 RX ADMIN — PIPERACILLIN AND TAZOBACTAM 25 GRAM(S): 4; .5 INJECTION, POWDER, LYOPHILIZED, FOR SOLUTION INTRAVENOUS at 21:44

## 2022-08-20 RX ADMIN — TAMSULOSIN HYDROCHLORIDE 0.4 MILLIGRAM(S): 0.4 CAPSULE ORAL at 21:45

## 2022-08-20 RX ADMIN — Medication 25 MILLIGRAM(S): at 18:11

## 2022-08-20 RX ADMIN — PREGABALIN 1000 MICROGRAM(S): 225 CAPSULE ORAL at 11:05

## 2022-08-20 RX ADMIN — Medication 30 MILLIGRAM(S): at 12:40

## 2022-08-20 RX ADMIN — PIPERACILLIN AND TAZOBACTAM 25 GRAM(S): 4; .5 INJECTION, POWDER, LYOPHILIZED, FOR SOLUTION INTRAVENOUS at 06:38

## 2022-08-20 RX ADMIN — POLYETHYLENE GLYCOL 3350 17 GRAM(S): 17 POWDER, FOR SOLUTION ORAL at 11:05

## 2022-08-20 RX ADMIN — ATORVASTATIN CALCIUM 10 MILLIGRAM(S): 80 TABLET, FILM COATED ORAL at 21:45

## 2022-08-20 RX ADMIN — Medication 30 MILLIGRAM(S): at 23:49

## 2022-08-20 NOTE — PROGRESS NOTE ADULT - SUBJECTIVE AND OBJECTIVE BOX
Chief Complaint: Called back for positive blood cultures    Interval Hx: Yesterday, patient with ongoing narrow complex tachycardia, does break with Cardizem drip but returns back into dysrhythmia once trip turned off despite metoprolol and PO diltiazem on board. EP discussed with patient and daughter. Recommended EP study/ablation and if declined, advised anti arrhythmic drug. Patient/daughter requested to be transferred to [place] for further care. Accepted by Cardiology Dr. Johnson. Transfer Center aware and he is awaiting transfer. Today, spoke with Transfer Center and MD at [facility] instructed Center not to transfer patient until Monday/Tuesday. At this point, patient feeling well. No complaints. No fever or chills. No dizziness, lightheadedness, chest pain, or palpitations. No abdominal pain, nausea, vomiting, diarrhea or constipation. Tolerating PO well. Eager to return home soon. Now in NSR with normal rate off of the diltiazem drip. On PO metoprolol and PO diltiazem.     ROS: All other systems reviewed and found to be negative with the exception of some physical deconditioning    Exam:  T(F): 98.2 (20 Aug 2022 20:50), Max: 98.4 (20 Aug 2022 07:50)  HR: 72 (20 Aug 2022 20:50) (59 - 75)  BP: 145/80 (20 Aug 2022 20:50) (111/56 - 147/53)  RR: 18 (20 Aug 2022 20:50) (18 - 18)  SpO2: 97% (20 Aug 2022 20:50) (93% - 97%) on room air    Gen: No acute distress  HEENT: NCAT PERRL EOMI MMM  Neck: Supple, no LAD, no JVD  CVS:  S1 S2 normal, RRR  Chest: Normal resp effort, lungs CTA b/L  Abd: +BS, non distended, soft, non tender, RUQ cholecystostomy drain site C/D/I, drainage bag contents bilious, non-bloody  Ext: No edema or calf tenderness  Skin: Warm, dry  Neuro: A+OX3, no deficits    Labs:                               10.6   6.33 )-----------( 351              32.4       139  |  109  |  5  ---------------------< 172  4.0   |   22   |  0.70    Ca 8.4   Phos 3.3   Mg 2.3    TPro  5.4  /  Alb  2.0  /  TBili  0.3  /  DBili  0.1  /  AST  25  /  ALT  29  /  AlkPhos  81     Urinalysis 8/13: Clear, N-, LE-, RBC 3-5, WBC 0-2, bact occ    Micro:  Bile fluid 8/16: GPCs on Gram's stain  Urine culture 8/13: Negative  Blood culture 8/13: Negative  Blood culture 8/13: Negative  Urine culture 8/12: Negative  Blood culture 8/11: Gram variable rods, still in process  Blood culture 8/11: Negative    Imaging:  NM HIDA scan 8/15: Prompt, homogeneous uptake of radiotracer by the hepatocytes. Activity is first seen in the bowel at 20 minutes. The gallbladder is not seen at any time during the study. There is good clearance of activity from the liver by the end of the study. Abnormal hepatobiliary scan. In the proper clinical setting, findings are consistent with acute cholecystitis.    Cardiac Testing:  (Prior admission)  Echo 8/12/22: The left ventricle is normal in size, wall motion, and contractility. Mild concentric left ventricular hypertrophy is present. Estimated left ventricular ejection fraction is 60-65 %. Normal appearing right ventricle structure and function. Mild aortic sclerosis is present with normal valvular opening. Mild to moderate aortic regurgitation is present. Mild mitral annular calcification is present. The mitral valve leaflets appear mildly thickened. Mild (1+) mitral regurgitation is present. The tricuspid valve leaflets are thin and pliable; valve motion is normal. Moderate (2+) eccentric tricuspid valve regurgitation is present. Moderate to severe pulmonary hypertension. Normal appearing pulmonic valve structure. Mild pulmonic valvular regurgitation (1+) is present. No evidence of pericardial effusion.    Procedures:  Cholecystostomy tube placement 8/16/22 by IR    Meds:  MEDICATIONS  (STANDING):  atorvastatin 10 milliGRAM(s) Oral at bedtime  clopidogrel Tablet 75 milliGRAM(s) Oral daily  cyanocobalamin 1000 MICROGram(s) Oral daily  diltiazem    Tablet 30 milliGRAM(s) Oral every 6 hours  heparin   Injectable 5000 Unit(s) SubCutaneous every 12 hours  insulin lispro (ADMELOG) corrective regimen sliding scale   SubCutaneous at bedtime  insulin lispro (ADMELOG) corrective regimen sliding scale   SubCutaneous three times a day before meals  metoprolol succinate ER 25 milliGRAM(s) Oral every 12 hours  piperacillin/tazobactam IVPB.. 3.375 Gram(s) IV Intermittent every 8 hours  polyethylene glycol 3350 17 Gram(s) Oral daily  senna 2 Tablet(s) Oral at bedtime  tamsulosin 0.4 milliGRAM(s) Oral at bedtime    MEDICATIONS  (PRN):  acetaminophen     Tablet .. 650 milliGRAM(s) Oral every 6 hours PRN Temp greater or equal to 38C (100.4F), Mild Pain (1 - 3)  aluminum hydroxide/magnesium hydroxide/simethicone Suspension 30 milliLiter(s) Oral every 4 hours PRN Dyspepsia  dextrose Oral Gel 15 Gram(s) Oral once PRN Blood Glucose LESS THAN 70 milliGRAM(s)/deciliter  HYDROmorphone  Injectable 0.5 milliGRAM(s) IV Push every 3 hours PRN Severe Pain (7 - 10)  melatonin 3 milliGRAM(s) Oral at bedtime PRN Insomnia  ondansetron Injectable 4 milliGRAM(s) IV Push once PRN Nausea and/or Vomiting  oxyCODONE    IR 5 milliGRAM(s) Oral every 6 hours PRN Moderate Pain (4 - 6)

## 2022-08-20 NOTE — PROGRESS NOTE ADULT - ASSESSMENT
89-year-old male with DM, HTN, HLD, CAD, BPH, prostate CA s/p rad was hospitalized on 8/11/22 and diagnosed with acute cholecystitis, declined interventions and was discharged on PO abx, called back for positive blood culture (Gram variable rods), placed on Zosyn and admitted to Medicine.     Acute cholecystitis  Lactate elevated on ED visit but not now. WBC elevated on admission. Bld Cx + for gram variable rods. Unclear if true bacteremia as (+) culture was one of two specimen on 8/11, could be contaminant though could be GI translocation. Repeat blood culture negative to date. HIDA 8/15 positive, suggestive of acute cholecystitis. Patient overall improved with Zosyn. Leukocytosis resolved. RUQ resolved. S/P cholecystostomy drain placement by IR 8/16.  - Continue with cholecystostomy drain, monitor output. Continue drain in-place upon discharge with plan for referral back to IR as outpatient for tube study in 6 weeks or so. IR outpatient [phone]. Dressing should be changed every 2-3 days or whenever soiled. No need to flush unless clogged. IR advised that patient should have referral for the tube study via Surgery Team that was following patient, that is, via Dr. Murray.   - Continue Zosyn, anticipate eventual transition to PO Augmentin to complete 14 days unless final cultures suggest otherwise  - Trend labs    Dysrhythmia  SVT upon admission, now with another narrow complex tachycardia. Consulted Cardiac EP. Started on Cardizem. Repleted K and Phos, now WNL. Etiology of his dysrhythmia a bit unclear. No signs of bleeding. Infection appears to be improving. Patient seen by EP Dr. Julian today, offered EP study/ablation and if not, anti arrhythmic drug. Patient however wishes to be transferred to [facility]. Accepted by Dr Felix Johnson. Today however he is back in NSR, rate WNL, off Cardizem drip, on PO metoprolol and PO Cardizem   - Monitor lytes  - Continue metoprolol, Cardizem PO with drip on standby  - Continue to monitor on tele  - F/u with Cardiology, Cardiac EP    Elevated troponin   As noted upon admission. Likely demand ischemia in setting on infection. No regional wall motion abnormality on echo. Ideally should have ischemic work up when stable, but patient has repeatedly refused  - Continue metoprolol, clopidogrel, statin  - Outpatient follow up with Cardiology    CAD, HTN, HLD  No angina or CHF sx. BP stable. Appears euvolemic.   - continue metoprolol, diltiazem, clopidogrel, statin    DM2  A1c 6.6. Glucose 130s-160s today  - Monitor glucose  - Continue insulin    Hypovolemic hyponatremia  Resolved      Dispo: For transfer to [facility], accepted by Cardiology Dr. Felix Johnson - not expected to transfer until Monday

## 2022-08-20 NOTE — PROGRESS NOTE ADULT - SUBJECTIVE AND OBJECTIVE BOX
CHIEF COMPLAINT:  Presents because of +BCx from recent hospitalization    HPI: The patient is a 89-year-old male with history of CAD, DM2, HTN, HLD, BPH, prostate CA s/p rad was hospitalized on 22 and diagnosed with acute cholecystitis. He was recommended cholecystostomy because of his elevated cardiac risk and he refused. He was very upset about being made NPO as refused all treatments and wanted to go home. He was discharged home yesterday on Augmentin.  The patient was recalled to the emergency room because his blood culture grew gram variable rods.  The patient was started on IV Unasyn in the ER.  In the emergency room the patient developed sinus tachycardia/SVT with the elevated heart rate to 140. He was given coreg and the above antibiotics and SVT resolved.    22: Patient upset that he got coreg. He states he does not tolerate coreg because it causes him dizziness and only wants metoprolol. He also continues to refuse any procedures and just wanted to come to the hospital for the IV antibiotics. He is upset again that he is NPO.    He denies any fevers, chils, CP, palp, SOB, current abd pain, N/V, dizziness, syncope, orthopnea, PND    8/15/22 SVT on monitor,, patient offers no complaints    22 s/o IR GB drain placement, intermittent SVT    22  back in SVT despite metoprolol and cardizem    PMHx:  PAST MEDICAL & SURGICAL HISTORY:  BPH (benign prostatic hyperplasia)  HLD (hyperlipidemia)  GERD (gastroesophageal reflux disease)  Prostate cancer s/p radiation  CAD S/P percutaneous coronary angioplasty 2014  DM2  Pulm HTN    Social History:  Former smoker  Denies any significant etoh or any illicit drug use    FAMILY HISTORY:   FAMILY HISTORY:  Family history of MI (myocardial infarction) (Father)  Family history of uterine cancer (Mother)    ALLERGIES:  Allergies  No Known Allergies    Vital Signs Last 24 Hrs  T(C): 36.8 (19 Aug 2022 21:07), Max: 36.8 (19 Aug 2022 21:07)  T(F): 98.3 (19 Aug 2022 21:07), Max: 98.3 (19 Aug 2022 21:07)  HR: 75 (20 Aug 2022 06:30) (59 - 84)  BP: 135/82 (20 Aug 2022 06:30) (111/56 - 145/61)  BP(mean): --  RR: 18 (20 Aug 2022 06:30) (18 - 18)  SpO2: 96% (20 Aug 2022 06:30) (93% - 98%)    Parameters below as of 20 Aug 2022 06:30  Patient On (Oxygen Delivery Method): room air            PHYSICAL EXAM:   Constitutional: awake and alert in NAD  HEENT: EOMI, Normal Hearing, MMM  Neck: Soft and supple, No LAD, mild + JVD, no carotid bruit  Respiratory: Breath sounds are clear bilaterally, No wheezing, rales or rhonchi, good air movement  Cardiovascular: S1 and S2,tachy , soft systolic murmur at LSB. PMI is nondisplaced  Gastrointestinal: perc-lor drain  Extremities: Warm and well perfused, no peripheral edema  Neurological: A/O x 3, no focal deficits appreciated  Skin: No rashes appreciated        MEDICATIONS  (STANDING):  atorvastatin 10 milliGRAM(s) Oral at bedtime  clopidogrel Tablet 75 milliGRAM(s) Oral daily  cyanocobalamin 1000 MICROGram(s) Oral daily  dextrose 5%. 1000 milliLiter(s) (50 mL/Hr) IV Continuous <Continuous>  dextrose 5%. 1000 milliLiter(s) (100 mL/Hr) IV Continuous <Continuous>  dextrose 50% Injectable 25 Gram(s) IV Push once  dextrose 50% Injectable 12.5 Gram(s) IV Push once  dextrose 50% Injectable 25 Gram(s) IV Push once  diltiazem    Tablet 30 milliGRAM(s) Oral every 6 hours  diltiazem Infusion 5 mG/Hr (5 mL/Hr) IV Continuous <Continuous>  glucagon  Injectable 1 milliGRAM(s) IntraMuscular once  heparin   Injectable 5000 Unit(s) SubCutaneous every 12 hours  insulin lispro (ADMELOG) corrective regimen sliding scale   SubCutaneous at bedtime  insulin lispro (ADMELOG) corrective regimen sliding scale   SubCutaneous three times a day before meals  metoprolol succinate ER 50 milliGRAM(s) Oral every 12 hours  piperacillin/tazobactam IVPB.. 3.375 Gram(s) IV Intermittent every 8 hours  polyethylene glycol 3350 17 Gram(s) Oral daily  senna 2 Tablet(s) Oral at bedtime  tamsulosin 0.4 milliGRAM(s) Oral at bedtime    MEDICATIONS  (PRN):  acetaminophen     Tablet .. 650 milliGRAM(s) Oral every 6 hours PRN Temp greater or equal to 38C (100.4F), Mild Pain (1 - 3)  aluminum hydroxide/magnesium hydroxide/simethicone Suspension 30 milliLiter(s) Oral every 4 hours PRN Dyspepsia  dextrose Oral Gel 15 Gram(s) Oral once PRN Blood Glucose LESS THAN 70 milliGRAM(s)/deciliter  HYDROmorphone  Injectable 0.5 milliGRAM(s) IV Push every 3 hours PRN Severe Pain (7 - 10)  melatonin 3 milliGRAM(s) Oral at bedtime PRN Insomnia  ondansetron Injectable 4 milliGRAM(s) IV Push once PRN Nausea and/or Vomiting  oxyCODONE    IR 5 milliGRAM(s) Oral every 6 hours PRN Moderate Pain (4 - 6)        139  |  109<H>  |  5<L>  ----------------------------<  172<H>  4.0   |  22  |  0.70    Ca    8.4<L>      19 Aug 2022 09:10  Phos  3.3       Mg     2.3                               10.6   6.33  )-----------( 351      ( 19 Aug 2022 09:10 )             32.4             LABS: All Labs Reviewed:                   BLOOD CULTURES: Organism --  Gram Stain Blood -- Gram Stain --  Specimen Source Clean Catch None  Culture-Blood --    Organism Blood Culture PCR  Gram Stain Blood -- Gram Stain   Growth in anaerobic bottle: Gram Variable Rods  Specimen Source .Blood None  Culture-Blood --    RADIOLOGY:    Reviewed in EMR    EK/13/22, my interpretation: NSR low voltage nonspecific ST changes     TELEMETRY:    Reviewed. SVT alt with SR    ECHO:    ACC: [id number] EXAM:  ECHO TTE WO CON COMP W DOPP                        PROCEDURE DATE:  2022      Indications   1) Z01.810 Encounter for preprocedural cardiovascular examination    M-Mode Measurements (cm)     LVEDd: 4.8 cm          LVESd: 3.02 cm   IVSEd: 1.23 cm   LVPWd: 1.13 cm            AO Root Dimension: 3.5 cm                             ACS: 1.9 cm                             LA: 3.8 cm    Doppler Measurements:     AV Velocity:146 cm/s               MV Peak E-Wave: 121 cm/s   AV Peak Gradient: 8.53 mmHg        MV Peak A-Wave: 122 cm/s                                      MV E/A Ratio: 0.99 %   TR Velocity:383 cm/s               MV Peak Gradient: 5.86 mmHg   TR Gradient:58.6756 mmHg   Estimated RAP:8 mmHg   RVSP:67 mmHg     Summary     The left ventricle is normal in size, wall motion, and contractility.   Mild concentric left ventricular hypertrophy is present.   Estimated left ventricular ejection fraction is 60-65 %.   Normal appearing right ventricle structure and function.   Mild aortic sclerosis is present with normal valvular opening.   Mild to moderate aortic regurgitation is present.   Mild mitral annular calcification is present.   The mitral valve leaflets appear mildly thickened.   Mild (1+) mitral regurgitation is present.   The tricuspid valve leaflets are thin and pliable; valve motion is normal.   Moderate (2+) eccentric tricuspid valve regurgitation is present.   Moderate to severe pulmonary hypertension.   Normal appearing pulmonic valve structure.   Mild pulmonic valvular regurgitation (1+) is present.   No evidence of pericardial effusion.

## 2022-08-20 NOTE — PROGRESS NOTE ADULT - ASSESSMENT
89 yo M with above PMHx who presents was recently admitted for acute cholecystitis refusing cholecystostomy who presents because of positive BCx. Course complicated by SVT    -SVT intermittent - likely from infection / physiologic strain- in SR/SB  with TOprol BID and Cardizen PO dosing- Amio was ordered but refused  Continue to monitor on tele for now- HR 50-80 Sinus-- if he remains james- would decrease cardizem dosing-- if he goes back into SVT- would strongly consider use of amio      s/p IR drain placement for GB-- on abx    CAD - ( hx of) now with Minimal elevation in CE,  likely due to physiologic strain - continue plavix  and statin - If need for surgery, and surgery requires patient to be off of plavix. would transition temporarily to Asa 81    Ideally should have ischemic work up when stable, but patient has repeated refused- will discuss as an outpatient        will continue to follow

## 2022-08-21 LAB
ANION GAP SERPL CALC-SCNC: 3 MMOL/L — LOW (ref 5–17)
BUN SERPL-MCNC: 6 MG/DL — LOW (ref 7–23)
CALCIUM SERPL-MCNC: 8.4 MG/DL — LOW (ref 8.5–10.1)
CHLORIDE SERPL-SCNC: 107 MMOL/L — SIGNIFICANT CHANGE UP (ref 96–108)
CO2 SERPL-SCNC: 28 MMOL/L — SIGNIFICANT CHANGE UP (ref 22–31)
CREAT SERPL-MCNC: 0.82 MG/DL — SIGNIFICANT CHANGE UP (ref 0.5–1.3)
CULTURE RESULTS: SIGNIFICANT CHANGE UP
EGFR: 83 ML/MIN/1.73M2 — SIGNIFICANT CHANGE UP
GLUCOSE SERPL-MCNC: 175 MG/DL — HIGH (ref 70–99)
MAGNESIUM SERPL-MCNC: 2.4 MG/DL — SIGNIFICANT CHANGE UP (ref 1.6–2.6)
ORGANISM # SPEC MICROSCOPIC CNT: SIGNIFICANT CHANGE UP
ORGANISM # SPEC MICROSCOPIC CNT: SIGNIFICANT CHANGE UP
PHOSPHATE SERPL-MCNC: 2.8 MG/DL — SIGNIFICANT CHANGE UP (ref 2.5–4.5)
POTASSIUM SERPL-MCNC: 3.9 MMOL/L — SIGNIFICANT CHANGE UP (ref 3.5–5.3)
POTASSIUM SERPL-SCNC: 3.9 MMOL/L — SIGNIFICANT CHANGE UP (ref 3.5–5.3)
SODIUM SERPL-SCNC: 138 MMOL/L — SIGNIFICANT CHANGE UP (ref 135–145)
SPECIMEN SOURCE: SIGNIFICANT CHANGE UP

## 2022-08-21 PROCEDURE — 99232 SBSQ HOSP IP/OBS MODERATE 35: CPT

## 2022-08-21 RX ORDER — DILTIAZEM HCL 120 MG
120 CAPSULE, EXT RELEASE 24 HR ORAL DAILY
Refills: 0 | Status: DISCONTINUED | OUTPATIENT
Start: 2022-08-21 | End: 2022-08-22

## 2022-08-21 RX ORDER — METOPROLOL TARTRATE 50 MG
5 TABLET ORAL ONCE
Refills: 0 | Status: COMPLETED | OUTPATIENT
Start: 2022-08-21 | End: 2022-08-21

## 2022-08-21 RX ADMIN — HEPARIN SODIUM 5000 UNIT(S): 5000 INJECTION INTRAVENOUS; SUBCUTANEOUS at 21:03

## 2022-08-21 RX ADMIN — POLYETHYLENE GLYCOL 3350 17 GRAM(S): 17 POWDER, FOR SOLUTION ORAL at 09:32

## 2022-08-21 RX ADMIN — PIPERACILLIN AND TAZOBACTAM 25 GRAM(S): 4; .5 INJECTION, POWDER, LYOPHILIZED, FOR SOLUTION INTRAVENOUS at 06:23

## 2022-08-21 RX ADMIN — HEPARIN SODIUM 5000 UNIT(S): 5000 INJECTION INTRAVENOUS; SUBCUTANEOUS at 09:32

## 2022-08-21 RX ADMIN — Medication 25 MILLIGRAM(S): at 09:32

## 2022-08-21 RX ADMIN — Medication 120 MILLIGRAM(S): at 14:40

## 2022-08-21 RX ADMIN — Medication 3 MILLIGRAM(S): at 21:04

## 2022-08-21 RX ADMIN — TAMSULOSIN HYDROCHLORIDE 0.4 MILLIGRAM(S): 0.4 CAPSULE ORAL at 21:03

## 2022-08-21 RX ADMIN — ATORVASTATIN CALCIUM 10 MILLIGRAM(S): 80 TABLET, FILM COATED ORAL at 21:03

## 2022-08-21 RX ADMIN — CLOPIDOGREL BISULFATE 75 MILLIGRAM(S): 75 TABLET, FILM COATED ORAL at 09:32

## 2022-08-21 RX ADMIN — Medication 5 MILLIGRAM(S): at 17:26

## 2022-08-21 RX ADMIN — Medication 30 MILLIGRAM(S): at 06:29

## 2022-08-21 RX ADMIN — PREGABALIN 1000 MICROGRAM(S): 225 CAPSULE ORAL at 09:32

## 2022-08-21 NOTE — PROGRESS NOTE ADULT - ASSESSMENT
89-year-old male with DM, HTN, HLD, CAD, BPH, prostate CA s/p rad was hospitalized on 8/11/22 and diagnosed with acute cholecystitis, declined interventions and was discharged on PO abx, called back for positive blood culture (Gram variable rods), placed on Zosyn and admitted to Medicine.     Acute cholecystitis  Resolving/resolved. Lactate elevated on ED visit but not now. WBC elevated on admission. Bld Cx (+) for gram variable rods but unclear if true bacteremia as (+) culture was one of two specimen on 8/11. Placed on Zosyn empirically. HIDA on 8/15 positive, suggestive of acute cholecystitis, underwent cholecystostomy drain placement by IR 8/16. Patient markedly improved now. No pain. No fever. Tolerating regular diet. Leukocytosis resolved. RUQ resolved. Repeat blood cultures negative to date.   - Continue Zosyn, anticipate eventual transition to PO Augmentin to complete 14 days unless final cultures suggest otherwise  - Continue with cholecystostomy drain, monitor output. Continue drain in-place upon discharge with plan for referral back to IR as outpatient for tube study in 6 weeks or so. IR outpatient [phone]. Dressing should be changed every 2-3 days or whenever soiled. No need to flush unless clogged. IR advised that patient should have referral for the tube study via Surgery Team that was following patient, that is, via Dr. Murray.     Intermittent episodes of SVT  Cardiac EP was consulted and started patient on Cardizem drip. Electrolyte derangements were repleted. Above-described infection has resolved. No signs of bleeding. Patient nonetheless continued to have episodes of SVT. Patient seen by EP Dr. Julian who offered EP study/ablation and if not, anti arrhythmic drug. Patient and family however wished to be transferred to [place], accepted by Dr Felix Johnson but not available for transfer until 8/22. This weekend he was able to remain in NSR off of the Cardizem drip with PO metoprolol and Cardizem on board so today, Cardizem was switched to long-acting. He did have a recurrence of narrow complex tachycardia this afternoon but this broke with a dose of IV metoprolol.  - Continue PO metoprolol, PO Cardizem PO   - Monitor lytes  - Continue to monitor on tele  - For transfer to [place] 8/22 (Dr. Johnson) unless patient is suitable for DC home instead    Elevated troponin   As noted upon admission. Likely demand ischemia in setting on infection. No regional wall motion abnormality on echo. Ideally should have ischemic work up when stable, but patient has repeatedly refused  - Continue metoprolol, clopidogrel, statin  - Outpatient follow up with Cardiology    CAD, HTN, HLD  No angina or CHF sx. BP stable. Appears euvolemic.   - continue metoprolol, diltiazem, clopidogrel, statin    DM2  A1c 6.6. Glucose 130s  - Monitor glucose  - Continue insulin    Hypovolemic hyponatremia  Resolved      Dispo: For transfer to [facility], accepted by Cardiology Dr. Felix Johnson - not expected to transfer until Monday. However, he may be suitable for discharge home by then.   89-year-old man with DM, HTN, HLD, CAD, BPH, prostate CA s/p radiation treatment, initially hospitalized on 8/11/22 and diagnosed with acute cholecystitis. He declined interventions and was discharged on PO antibiotics, called back on 8/13 for positive blood culture (Gram variable rods). In the ED he was afebrile and fairly asymptomatic aside from some slight RUQ discomfort. Labs were notable for leukocytosis and elevated lactate. He was placed on Zosyn and admitted to Medicine.     Sepsis due to acute cholecystitis  Resolved. Bld Cx (+) for gram variable rods but unclear if true bacteremia as (+) culture was one of two specimen on 8/11. Placed on Zosyn empirically. HIDA on 8/15 positive, suggestive of acute cholecystitis, underwent cholecystostomy drain placement by IR 8/16. Patient markedly improved now. No pain. No fever. Tolerating regular diet. Leukocytosis resolved. RUQ resolved. Repeat blood cultures negative to date.   - Continue Zosyn, anticipate eventual transition to PO Augmentin to complete 14 days unless final cultures suggest otherwise  - Continue with cholecystostomy drain, monitor output. Continue drain in-place upon discharge with plan for referral back to IR as outpatient for tube study in 6 weeks or so. IR outpatient [phone]. Dressing should be changed every 2-3 days or whenever soiled. No need to flush unless clogged. IR advised that patient should have referral for the tube study via Surgery Team that was following patient, that is, via Dr. Murray.     Intermittent episodes of SVT  Cardiac EP was consulted and started patient on Cardizem drip. Electrolyte derangements were repleted. Above-described infection has resolved. No signs of bleeding. Patient nonetheless continued to have episodes of SVT. Patient seen by EP Dr. Julian who offered EP study/ablation and if not, anti arrhythmic drug. Patient and family however wished to be transferred to [place], accepted by Dr Felix Johnson but not available for transfer until 8/22. This weekend he was able to remain in NSR off of the Cardizem drip with PO metoprolol and Cardizem on board so today, Cardizem was switched to long-acting. He did have a recurrence of narrow complex tachycardia this afternoon but this broke with a dose of IV metoprolol.  - Continue PO metoprolol, PO Cardizem PO   - Monitor lytes  - Continue to monitor on tele  - For transfer to [place] 8/22 (Dr. Johnson) unless patient is suitable for DC home instead    Elevated troponin   As noted upon admission. Likely demand ischemia in setting on infection. No regional wall motion abnormality on echo. Ideally should have ischemic work up when stable, but patient has repeatedly refused  - Continue metoprolol, clopidogrel, statin  - Outpatient follow up with Cardiology    CAD, HTN, HLD  No angina or CHF sx. BP stable. Appears euvolemic.   - continue metoprolol, diltiazem, clopidogrel, statin    DM2  A1c 6.6. Glucose 130s  - Monitor glucose  - Continue insulin    Hypovolemic hyponatremia  Resolved      Dispo: For transfer to [facility], accepted by Cardiology Dr. Felix Johnson - not expected to transfer until Monday. However, he may be suitable for discharge home by then.   89-year-old man with DM, HTN, HLD, CAD, BPH, prostate CA s/p radiation treatment, initially hospitalized on 8/11/22 and diagnosed with acute cholecystitis. He declined interventions and was discharged on PO antibiotics, called back on 8/13 for positive blood culture (Gram variable rods). In the ED he was afebrile and fairly asymptomatic aside from some slight RUQ discomfort. Labs were notable for leukocytosis and elevated lactate. He was placed on Zosyn and admitted to Medicine.     Sepsis due to acute cholecystitis  Resolved. Patient called back to hospital for Bld Cx 1 of 2 specimens 8/11 (+) for gram variable rods, unclear if true bacteremia. Patient was placed on Zosyn empirically. Further workup with HIDA scan 8/15 indicated acute cholecystitis. Patient underwent cholecystostomy drain placement by IR 8/16. Patient is now markedly improved. No pain. No fever. Tolerating regular diet. Leukocytosis resolved. Repeat blood cultures negative to date.   - Continue Zosyn but anticipate imminent transition to PO Augmentin to complete 14 days, unless final cultures suggest otherwise  - Continue with cholecystostomy drain, monitor output. Plan to keep drain in-place upon discharge with patient to return to IR as outpatient for tube study in 6 weeks or so. IR outpatient [phone]. Dressing should be changed every 2-3 days or whenever soiled. No need to flush unless clogged. IR advised that patient should have referral for the tube study via Surgery Team that was following patient, that is, via Dr. Murray.     Intermittent episodes of SVT  Cardiac EP was consulted and started patient on Cardizem drip. Electrolyte derangements were repleted. Above-described infection has resolved. No signs of bleeding. Patient nonetheless continued to have episodes of SVT. Patient seen by EP Dr. Julian who offered EP study/ablation and if not, anti arrhythmic drug. Patient and family however wished to be transferred to [place], accepted by Dr Felix Johnson but not available for transfer until 8/22. This weekend he was able to remain in NSR off of the Cardizem drip with PO metoprolol and Cardizem on board so today, Cardizem was switched to long-acting. He did have a recurrence of narrow complex tachycardia this afternoon but this broke with a dose of IV metoprolol.  - Continue PO metoprolol, PO Cardizem PO   - Monitor lytes  - Continue to monitor on tele  - For transfer to [place] 8/22 (Dr. Johnson) unless patient is suitable for DC home instead    Elevated troponin   As noted upon admission. Likely demand ischemia in setting on infection. No regional wall motion abnormality on echo. Ideally should have ischemic work up when stable, but patient has repeatedly refused  - Continue metoprolol, clopidogrel, statin  - Outpatient follow up with Cardiology    CAD, HTN, HLD  No angina or CHF sx. BP stable. Appears euvolemic.   - continue metoprolol, diltiazem, clopidogrel, statin    DM2  A1c 6.6. Glucose 130s  - Monitor glucose  - Continue insulin    Hypovolemic hyponatremia  Resolved      Dispo: For transfer to [facility], accepted by Cardiology Dr. Felix Johnson - not expected to transfer until Monday. However, he may be suitable for discharge home by then.

## 2022-08-21 NOTE — PROGRESS NOTE ADULT - SUBJECTIVE AND OBJECTIVE BOX
Chief Complaint: Called back for positive blood cultures    Interval Hx: Patient seen and examined. Patient feeling well. Asymptomatic now. No fevers. No chills. No abdominal pain, nausea or any other GI complaints. Tolerating regular diet well. No chest complaints. No dizziness, lightheadedness or palpitations. As of this AM he was remaining in NSR off Cardizem drip x 24 hrs, maintained well on metoprolol and Cardizem PO q6h. Switched Cardizem to long-acting. However, this afternoon, he developed recurrence of SVT to 130s but was asymptomatic with it. He was given metoprolol succinate 5mg IVP x 1 and he returned to NSR with normal rate. He has been waiting for transfer to [place] for EP study/ablation as requested by daughter and accepted by Cardiology Dr. Johnson. Transfer Center aware. Patient and family are still interested in being transferred to [place] tomorrow unless he is in suitable condition to be discharged home instead.     ROS: All other systems reviewed and found to be negative with the exception of some physical deconditioning    Exam:  T(F): 97.8 (21 Aug 2022 20:46), Max: 98.1 (20 Aug 2022 23:32)  HR: 55 (21 Aug 2022 20:57) (55 - 136)  BP: 135/85 (21 Aug 2022 20:46) (102/71 - 149/70)  RR: 18 (21 Aug 2022 20:46) (18 - 18)  SpO2: 95% (21 Aug 2022 20:46) (92% - 96%) on room air    Gen: No acute distress  HEENT: NCAT PERRL EOMI MMM  Neck: Supple, no LAD, no JVD  CVS:  S1 S2 normal, RRR  Chest: Normal resp effort, lungs CTA b/L  Abd: +BS, non distended, soft, non tender, RUQ cholecystostomy drain site C/D/I, drainage bag contents bilious, non-bloody  Ext: No edema or calf tenderness  Skin: Warm, dry  Neuro: A+OX3, no deficits    Labs:                               10.6   6.33 )-----------( 351              32.4       138  |  107  |  6  ----------------------< 175  3.9   |   28   |  0.82    Ca 8.4  Phos 2.8   Mg 2.4        TPro  5.4  /  Alb  2.0  /  TBili  0.3  /  DBili  0.1  /  AST  25  /  ALT  29  /  AlkPhos  81     Urinalysis 8/13: Clear, N-, LE-, RBC 3-5, WBC 0-2, bact occ    Micro:  Bile fluid 8/16: GPCs on Gram's stain  Urine culture 8/13: Negative  Blood culture 8/13: Negative  Blood culture 8/13: Negative  Urine culture 8/12: Negative  Blood culture 8/11: Gram variable rods, still in process  Blood culture 8/11: Negative    Imaging:  NM HIDA scan 8/15: Prompt, homogeneous uptake of radiotracer by the hepatocytes. Activity is first seen in the bowel at 20 minutes. The gallbladder is not seen at any time during the study. There is good clearance of activity from the liver by the end of the study. Abnormal hepatobiliary scan. In the proper clinical setting, findings are consistent with acute cholecystitis.    Cardiac Testing:  (Prior admission)  Echo 8/12/22: The left ventricle is normal in size, wall motion, and contractility. Mild concentric left ventricular hypertrophy is present. Estimated left ventricular ejection fraction is 60-65 %. Normal appearing right ventricle structure and function. Mild aortic sclerosis is present with normal valvular opening. Mild to moderate aortic regurgitation is present. Mild mitral annular calcification is present. The mitral valve leaflets appear mildly thickened. Mild (1+) mitral regurgitation is present. The tricuspid valve leaflets are thin and pliable; valve motion is normal. Moderate (2+) eccentric tricuspid valve regurgitation is present. Moderate to severe pulmonary hypertension. Normal appearing pulmonic valve structure. Mild pulmonic valvular regurgitation (1+) is present. No evidence of pericardial effusion.    Procedures:  Cholecystostomy tube placement 8/16/22 by IR    Meds:  MEDICATIONS  (STANDING):  atorvastatin 10 milliGRAM(s) Oral at bedtime  clopidogrel Tablet 75 milliGRAM(s) Oral daily  cyanocobalamin 1000 MICROGram(s) Oral daily  diltiazem    milliGRAM(s) Oral daily  heparin   Injectable 5000 Unit(s) SubCutaneous every 12 hours  insulin lispro (ADMELOG) corrective regimen sliding scale   SubCutaneous at bedtime  insulin lispro (ADMELOG) corrective regimen sliding scale   SubCutaneous three times a day before meals  metoprolol succinate ER 25 milliGRAM(s) Oral every 12 hours  polyethylene glycol 3350 17 Gram(s) Oral daily  senna 2 Tablet(s) Oral at bedtime  tamsulosin 0.4 milliGRAM(s) Oral at bedtime    MEDICATIONS  (PRN):  acetaminophen     Tablet .. 650 milliGRAM(s) Oral every 6 hours PRN Temp greater or equal to 38C (100.4F), Mild Pain (1 - 3)  aluminum hydroxide/magnesium hydroxide/simethicone Suspension 30 milliLiter(s) Oral every 4 hours PRN Dyspepsia  dextrose Oral Gel 15 Gram(s) Oral once PRN Blood Glucose LESS THAN 70 milliGRAM(s)/deciliter  HYDROmorphone  Injectable 0.5 milliGRAM(s) IV Push every 3 hours PRN Severe Pain (7 - 10)  melatonin 3 milliGRAM(s) Oral at bedtime PRN Insomnia  ondansetron Injectable 4 milliGRAM(s) IV Push once PRN Nausea and/or Vomiting  oxyCODONE    IR 5 milliGRAM(s) Oral every 6 hours PRN Moderate Pain (4 - 6)   Chief Complaint: Called back for positive blood cultures    Interval Hx: Patient seen and examined. Patient feeling well. Asymptomatic now. No fevers. No chills. No abdominal pain, nausea or any other GI complaints. Tolerating regular diet well. No chest complaints. No dizziness, lightheadedness or palpitations. As of this AM he was remaining in NSR off Cardizem drip x 24 hrs, maintained well on metoprolol and Cardizem PO q6h. Switched Cardizem to long-acting. However, this afternoon, he developed recurrence of SVT to 130s but was asymptomatic with it. He was given metoprolol 5mg IVP x 1 and he returned to NSR with normal rate. He has been waiting for transfer to [place] for EP study/ablation as requested by daughter and accepted by Cardiology Dr. Johnson. Transfer Center aware. Patient and family are still interested in being transferred to [place] tomorrow unless he is in suitable condition to be discharged home instead.     ROS: All other systems reviewed and found to be negative with the exception of some physical deconditioning    Exam:  T(F): 97.8 (21 Aug 2022 20:46), Max: 98.1 (20 Aug 2022 23:32)  HR: 55 (21 Aug 2022 20:57) (55 - 136)  BP: 135/85 (21 Aug 2022 20:46) (102/71 - 149/70)  RR: 18 (21 Aug 2022 20:46) (18 - 18)  SpO2: 95% (21 Aug 2022 20:46) (92% - 96%) on room air    Gen: No acute distress  HEENT: NCAT PERRL EOMI MMM  Neck: Supple, no LAD, no JVD  CVS:  S1 S2 normal, RRR  Chest: Normal resp effort, lungs CTA b/L  Abd: +BS, non distended, soft, non tender, RUQ cholecystostomy drain site C/D/I, drainage bag contents bilious, non-bloody  Ext: No edema or calf tenderness  Skin: Warm, dry  Neuro: A+OX3, no deficits    Labs:                               10.6   6.33 )-----------( 351              32.4       138  |  107  |  6  ----------------------< 175  3.9   |   28   |  0.82    Ca 8.4  Phos 2.8   Mg 2.4        TPro  5.4  /  Alb  2.0  /  TBili  0.3  /  DBili  0.1  /  AST  25  /  ALT  29  /  AlkPhos  81     Urinalysis 8/13: Clear, N-, LE-, RBC 3-5, WBC 0-2, bact occ    Micro:  Bile fluid 8/16: GPCs on Gram's stain  Urine culture 8/13: Negative  Blood culture 8/13: Negative  Blood culture 8/13: Negative  Urine culture 8/12: Negative  Blood culture 8/11: Gram variable rods, still in process  Blood culture 8/11: Negative    Imaging:  NM HIDA scan 8/15: Prompt, homogeneous uptake of radiotracer by the hepatocytes. Activity is first seen in the bowel at 20 minutes. The gallbladder is not seen at any time during the study. There is good clearance of activity from the liver by the end of the study. Abnormal hepatobiliary scan. In the proper clinical setting, findings are consistent with acute cholecystitis.    Cardiac Testing:  (Prior admission)  Echo 8/12/22: The left ventricle is normal in size, wall motion, and contractility. Mild concentric left ventricular hypertrophy is present. Estimated left ventricular ejection fraction is 60-65 %. Normal appearing right ventricle structure and function. Mild aortic sclerosis is present with normal valvular opening. Mild to moderate aortic regurgitation is present. Mild mitral annular calcification is present. The mitral valve leaflets appear mildly thickened. Mild (1+) mitral regurgitation is present. The tricuspid valve leaflets are thin and pliable; valve motion is normal. Moderate (2+) eccentric tricuspid valve regurgitation is present. Moderate to severe pulmonary hypertension. Normal appearing pulmonic valve structure. Mild pulmonic valvular regurgitation (1+) is present. No evidence of pericardial effusion.    Procedures:  Cholecystostomy tube placement 8/16/22 by IR    Meds:  MEDICATIONS  (STANDING):  atorvastatin 10 milliGRAM(s) Oral at bedtime  clopidogrel Tablet 75 milliGRAM(s) Oral daily  cyanocobalamin 1000 MICROGram(s) Oral daily  diltiazem    milliGRAM(s) Oral daily  heparin   Injectable 5000 Unit(s) SubCutaneous every 12 hours  insulin lispro (ADMELOG) corrective regimen sliding scale   SubCutaneous at bedtime  insulin lispro (ADMELOG) corrective regimen sliding scale   SubCutaneous three times a day before meals  metoprolol succinate ER 25 milliGRAM(s) Oral every 12 hours  polyethylene glycol 3350 17 Gram(s) Oral daily  senna 2 Tablet(s) Oral at bedtime  tamsulosin 0.4 milliGRAM(s) Oral at bedtime    MEDICATIONS  (PRN):  acetaminophen     Tablet .. 650 milliGRAM(s) Oral every 6 hours PRN Temp greater or equal to 38C (100.4F), Mild Pain (1 - 3)  aluminum hydroxide/magnesium hydroxide/simethicone Suspension 30 milliLiter(s) Oral every 4 hours PRN Dyspepsia  dextrose Oral Gel 15 Gram(s) Oral once PRN Blood Glucose LESS THAN 70 milliGRAM(s)/deciliter  HYDROmorphone  Injectable 0.5 milliGRAM(s) IV Push every 3 hours PRN Severe Pain (7 - 10)  melatonin 3 milliGRAM(s) Oral at bedtime PRN Insomnia  ondansetron Injectable 4 milliGRAM(s) IV Push once PRN Nausea and/or Vomiting  oxyCODONE    IR 5 milliGRAM(s) Oral every 6 hours PRN Moderate Pain (4 - 6)

## 2022-08-21 NOTE — PROGRESS NOTE ADULT - ASSESSMENT
89 yo M with above PMHx who presents was recently admitted for acute cholecystitis refusing cholecystostomy who presents because of positive BCx. Course complicated by SVT    -SVT intermittent - likely from infection / physiologic strain- in SR/SB  with TOprol BID and Cardizen PO dosing- Amio was ordered but refused-- with bradycardia at times - difficult to further titrate medications- some element od SSS  Continue to monitor on tele for now- HR 50-80 Sinus-- if he remains james- would decrease cardizem dosing-- if he goes back into SVT- would strongly consider use of amio  not urgent now, if patient continues to have alternating episodes of SB and SVT,, may benefit from PPM     s/p IR drain placement for GB-- on abx    CAD - ( hx of) now with Minimal elevation in CE,  likely due to physiologic strain - continue plavix  and statin - If need for surgery, and surgery requires patient to be off of plavix. would transition temporarily to Asa 81    Ideally should have ischemic work up when stable, but patient has repeated refused- will discuss as an outpatient        will continue to follow

## 2022-08-21 NOTE — PROGRESS NOTE ADULT - SUBJECTIVE AND OBJECTIVE BOX
CHIEF COMPLAINT:  Presents because of +BCx from recent hospitalization    HPI: The patient is a 89-year-old male with history of CAD, DM2, HTN, HLD, BPH, prostate CA s/p rad was hospitalized on 22 and diagnosed with acute cholecystitis. He was recommended cholecystostomy because of his elevated cardiac risk and he refused. He was very upset about being made NPO as refused all treatments and wanted to go home. He was discharged home yesterday on Augmentin.  The patient was recalled to the emergency room because his blood culture grew gram variable rods.  The patient was started on IV Unasyn in the ER.  In the emergency room the patient developed sinus tachycardia/SVT with the elevated heart rate to 140. He was given coreg and the above antibiotics and SVT resolved.    22: Patient upset that he got coreg. He states he does not tolerate coreg because it causes him dizziness and only wants metoprolol. He also continues to refuse any procedures and just wanted to come to the hospital for the IV antibiotics. He is upset again that he is NPO.    He denies any fevers, chils, CP, palp, SOB, current abd pain, N/V, dizziness, syncope, orthopnea, PND        22 feeling well this AM-- predominantly in SR/SB with brief SVT     PMHx:  PAST MEDICAL & SURGICAL HISTORY:  BPH (benign prostatic hyperplasia)  HLD (hyperlipidemia)  GERD (gastroesophageal reflux disease)  Prostate cancer s/p radiation  CAD S/P percutaneous coronary angioplasty 2014  DM2  Pulm HTN    Social History:  Former smoker  Denies any significant etoh or any illicit drug use    FAMILY HISTORY:   FAMILY HISTORY:  Family history of MI (myocardial infarction) (Father)  Family history of uterine cancer (Mother)    ALLERGIES:  Allergies  No Known Allergies        Vital Signs Last 24 Hrs  T(C): 36.7 (21 Aug 2022 05:34), Max: 36.9 (20 Aug 2022 16:16)  T(F): 98 (21 Aug 2022 05:34), Max: 98.4 (20 Aug 2022 16:16)  HR: 65 (21 Aug 2022 05:34) (58 - 72)  BP: 149/70 (21 Aug 2022 05:34) (145/80 - 149/70)  BP(mean): --  RR: 18 (20 Aug 2022 20:50) (18 - 18)  SpO2: 92% (21 Aug 2022 05:34) (92% - 97%)    Parameters below as of 21 Aug 2022 05:34  Patient On (Oxygen Delivery Method): room air            PHYSICAL EXAM:   Constitutional: awake and alert in NAD  HEENT: EOMI, Normal Hearing, MMM  Neck: Soft and supple, No LAD, mild + JVD, no carotid bruit  Respiratory: Breath sounds are clear bilaterally, No wheezing, rales or rhonchi, good air movement  Cardiovascular: S1 and S2,tachy , soft systolic murmur at LSB. PMI is nondisplaced  Gastrointestinal: perc-lor drain  Extremities: Warm and well perfused, no peripheral edema  Neurological: A/O x 3, no focal deficits appreciated  Skin: No rashes appreciated      MEDICATIONS  (STANDING):  atorvastatin 10 milliGRAM(s) Oral at bedtime  clopidogrel Tablet 75 milliGRAM(s) Oral daily  cyanocobalamin 1000 MICROGram(s) Oral daily  dextrose 5%. 1000 milliLiter(s) (50 mL/Hr) IV Continuous <Continuous>  dextrose 5%. 1000 milliLiter(s) (100 mL/Hr) IV Continuous <Continuous>  dextrose 50% Injectable 25 Gram(s) IV Push once  dextrose 50% Injectable 12.5 Gram(s) IV Push once  dextrose 50% Injectable 25 Gram(s) IV Push once  diltiazem    Tablet 30 milliGRAM(s) Oral every 6 hours  diltiazem Infusion 5 mG/Hr (5 mL/Hr) IV Continuous <Continuous>  glucagon  Injectable 1 milliGRAM(s) IntraMuscular once  heparin   Injectable 5000 Unit(s) SubCutaneous every 12 hours  insulin lispro (ADMELOG) corrective regimen sliding scale   SubCutaneous at bedtime  insulin lispro (ADMELOG) corrective regimen sliding scale   SubCutaneous three times a day before meals  metoprolol succinate ER 25 milliGRAM(s) Oral every 12 hours  polyethylene glycol 3350 17 Gram(s) Oral daily  senna 2 Tablet(s) Oral at bedtime  tamsulosin 0.4 milliGRAM(s) Oral at bedtime    MEDICATIONS  (PRN):  acetaminophen     Tablet .. 650 milliGRAM(s) Oral every 6 hours PRN Temp greater or equal to 38C (100.4F), Mild Pain (1 - 3)  aluminum hydroxide/magnesium hydroxide/simethicone Suspension 30 milliLiter(s) Oral every 4 hours PRN Dyspepsia  dextrose Oral Gel 15 Gram(s) Oral once PRN Blood Glucose LESS THAN 70 milliGRAM(s)/deciliter  HYDROmorphone  Injectable 0.5 milliGRAM(s) IV Push every 3 hours PRN Severe Pain (7 - 10)  melatonin 3 milliGRAM(s) Oral at bedtime PRN Insomnia  ondansetron Injectable 4 milliGRAM(s) IV Push once PRN Nausea and/or Vomiting  oxyCODONE    IR 5 milliGRAM(s) Oral every 6 hours PRN Moderate Pain (4 - 6)                                  10.6   6.33  )-----------( 351      ( 19 Aug 2022 09:10 )             32.4   08-    139  |  109<H>  |  5<L>  ----------------------------<  172<H>  4.0   |  22  |  0.70    Ca    8.4<L>      19 Aug 2022 09:10  Phos  3.3       Mg     2.3                             BLOOD CULTURES: Organism --  Gram Stain Blood -- Gram Stain --  Specimen Source Clean Catch None  Culture-Blood --    Organism Blood Culture PCR  Gram Stain Blood -- Gram Stain   Growth in anaerobic bottle: Gram Variable Rods  Specimen Source .Blood None  Culture-Blood --    RADIOLOGY:    Reviewed in EMR    EK/13/22, my interpretation: NSR low voltage nonspecific ST changes     TELEMETRY:    Reviewed. SVT alt with SR    ECHO:    ACC: [id number] EXAM:  ECHO TTE WO CON COMP W DOPP                        PROCEDURE DATE:  2022      Indications   1) Z01.810 Encounter for preprocedural cardiovascular examination    M-Mode Measurements (cm)     LVEDd: 4.8 cm          LVESd: 3.02 cm   IVSEd: 1.23 cm   LVPWd: 1.13 cm            AO Root Dimension: 3.5 cm                             ACS: 1.9 cm                             LA: 3.8 cm    Doppler Measurements:     AV Velocity:146 cm/s               MV Peak E-Wave: 121 cm/s   AV Peak Gradient: 8.53 mmHg        MV Peak A-Wave: 122 cm/s                                      MV E/A Ratio: 0.99 %   TR Velocity:383 cm/s               MV Peak Gradient: 5.86 mmHg   TR Gradient:58.6756 mmHg   Estimated RAP:8 mmHg   RVSP:67 mmHg     Summary     The left ventricle is normal in size, wall motion, and contractility.   Mild concentric left ventricular hypertrophy is present.   Estimated left ventricular ejection fraction is 60-65 %.   Normal appearing right ventricle structure and function.   Mild aortic sclerosis is present with normal valvular opening.   Mild to moderate aortic regurgitation is present.   Mild mitral annular calcification is present.   The mitral valve leaflets appear mildly thickened.   Mild (1+) mitral regurgitation is present.   The tricuspid valve leaflets are thin and pliable; valve motion is normal.   Moderate (2+) eccentric tricuspid valve regurgitation is present.   Moderate to severe pulmonary hypertension.   Normal appearing pulmonic valve structure.   Mild pulmonic valvular regurgitation (1+) is present.   No evidence of pericardial effusion.

## 2022-08-22 LAB
CULTURE RESULTS: SIGNIFICANT CHANGE UP
GRAM STN FLD: SIGNIFICANT CHANGE UP
SPECIMEN SOURCE: SIGNIFICANT CHANGE UP

## 2022-08-22 PROCEDURE — 99232 SBSQ HOSP IP/OBS MODERATE 35: CPT

## 2022-08-22 RX ORDER — LACTOBACILLUS ACIDOPHILUS 100MM CELL
1 CAPSULE ORAL
Refills: 0 | Status: DISCONTINUED | OUTPATIENT
Start: 2022-08-22 | End: 2022-08-23

## 2022-08-22 RX ORDER — DILTIAZEM HCL 120 MG
10 CAPSULE, EXT RELEASE 24 HR ORAL ONCE
Refills: 0 | Status: COMPLETED | OUTPATIENT
Start: 2022-08-22 | End: 2022-08-22

## 2022-08-22 RX ORDER — METOPROLOL TARTRATE 50 MG
1 TABLET ORAL
Qty: 0 | Refills: 0 | DISCHARGE
Start: 2022-08-22

## 2022-08-22 RX ORDER — LACTOBACILLUS ACIDOPHILUS 100MM CELL
1 CAPSULE ORAL
Qty: 0 | Refills: 0 | DISCHARGE
Start: 2022-08-22

## 2022-08-22 RX ORDER — METOPROLOL TARTRATE 50 MG
5 TABLET ORAL EVERY 6 HOURS
Refills: 0 | Status: DISCONTINUED | OUTPATIENT
Start: 2022-08-22 | End: 2022-08-22

## 2022-08-22 RX ORDER — DILTIAZEM HCL 120 MG
120 CAPSULE, EXT RELEASE 24 HR ORAL DAILY
Refills: 0 | Status: DISCONTINUED | OUTPATIENT
Start: 2022-08-23 | End: 2022-08-23

## 2022-08-22 RX ORDER — DILTIAZEM HCL 120 MG
1 CAPSULE, EXT RELEASE 24 HR ORAL
Qty: 0 | Refills: 0 | DISCHARGE
Start: 2022-08-22

## 2022-08-22 RX ORDER — DILTIAZEM HCL 120 MG
5 CAPSULE, EXT RELEASE 24 HR ORAL
Qty: 125 | Refills: 0 | Status: DISCONTINUED | OUTPATIENT
Start: 2022-08-22 | End: 2022-08-22

## 2022-08-22 RX ADMIN — Medication 1 TABLET(S): at 21:14

## 2022-08-22 RX ADMIN — ATORVASTATIN CALCIUM 10 MILLIGRAM(S): 80 TABLET, FILM COATED ORAL at 21:14

## 2022-08-22 RX ADMIN — PREGABALIN 1000 MICROGRAM(S): 225 CAPSULE ORAL at 11:12

## 2022-08-22 RX ADMIN — Medication 10 MILLIGRAM(S): at 14:22

## 2022-08-22 RX ADMIN — Medication 1 TABLET(S): at 21:15

## 2022-08-22 RX ADMIN — HEPARIN SODIUM 5000 UNIT(S): 5000 INJECTION INTRAVENOUS; SUBCUTANEOUS at 11:09

## 2022-08-22 RX ADMIN — Medication 25 MILLIGRAM(S): at 21:14

## 2022-08-22 RX ADMIN — POLYETHYLENE GLYCOL 3350 17 GRAM(S): 17 POWDER, FOR SOLUTION ORAL at 11:09

## 2022-08-22 RX ADMIN — CLOPIDOGREL BISULFATE 75 MILLIGRAM(S): 75 TABLET, FILM COATED ORAL at 11:08

## 2022-08-22 RX ADMIN — HEPARIN SODIUM 5000 UNIT(S): 5000 INJECTION INTRAVENOUS; SUBCUTANEOUS at 21:14

## 2022-08-22 RX ADMIN — Medication 120 MILLIGRAM(S): at 11:09

## 2022-08-22 RX ADMIN — Medication 25 MILLIGRAM(S): at 11:08

## 2022-08-22 RX ADMIN — TAMSULOSIN HYDROCHLORIDE 0.4 MILLIGRAM(S): 0.4 CAPSULE ORAL at 21:14

## 2022-08-22 RX ADMIN — Medication 1: at 17:39

## 2022-08-22 NOTE — PROGRESS NOTE ADULT - ASSESSMENT
89-year-old man with DM, HTN, HLD, CAD, BPH, prostate CA s/p radiation treatment, initially hospitalized on 8/11/22 and diagnosed with acute cholecystitis. He declined interventions and was discharged on PO antibiotics, called back on 8/13 for positive blood culture (Gram variable rods). In the ED he was afebrile and fairly asymptomatic aside from some slight RUQ discomfort. Labs were notable for leukocytosis and elevated lactate. He was placed on Zosyn and admitted to Medicine.     Sepsis due to acute cholecystitis  Resolved. Patient called back to hospital for Bld Cx 1 of 2 specimens 8/11 (+) for gram variable rods, unclear if true bacteremia.   Patient was placed on Zosyn empirically. Further workup with HIDA scan 8/15 indicated acute cholecystitis. Patient underwent cholecystostomy drain placement by IR 8/16.  Patient is now markedly improved. No pain. No fever. Tolerating regular diet. Leukocytosis resolved. Repeat blood cultures negative to date.   - Finsihed couruse of IV Zosyn - started on  PO Augmentin for 14 days - D/W Dr. Javier   - Continue with cholecystostomy drain, monitor output. Plan to keep drain in-place upon discharge with patient to return to IR as outpatient for tube study in 6 weeks or so.   IR outpatient [phone]  Dressing should be changed every 2-3 days or whenever soiled.   No need to flush unless clogged.   IR advised that patient should have referral for the tube study via Surgery Team that was following patient, that is, via Dr. Murray.     Intermittent episodes of SVT  - s/p cardizem drip. Patient nonetheless continued to have bursts of 's - got cardizem 10 mg ivp today 08/22 with resolution of SVTs  - Continue PO metoprolol, PO Cardizem PO  - Monitor lytes  - Continue to monitor on tele  - For transfer to [place] 8/22 (Dr. Johnson) - pt. want sto go home if no bed available     Elevated troponin   As noted upon admission. Likely demand ischemia in setting on infection. No regional wall motion abnormality on echo. I  deally should have ischemic work up when stable, but patient has repeatedly refused  - Continue metoprolol, clopidogrel, statin  - Outpatient follow up with Cardiology    CAD, HTN, HLD  No angina or CHF sx. BP stable. Appears euvolemic.   - continue metoprolol, diltiazem, clopidogrel, statin    DM2  A1c 6.6. Glucose 130s  - Monitor glucose  - Continue insulin    Hypovolemic hyponatremia  Resolved      Dispo: For transfer to [facility], accepted by Cardiology Dr. Felix Johnson - when bed available - pt. will be dc home tomorrow if still no bed

## 2022-08-22 NOTE — PROGRESS NOTE ADULT - SUBJECTIVE AND OBJECTIVE BOX
Chief Complaint: Called back for positive blood cultures    Interval Hx: Patient seen and examined. Patient feeling well. Asymptomatic now. No fevers. No chills. No abdominal pain, nausea or any other GI complaints. Tolerating regular diet well. No chest complaints. No dizziness, lightheadedness or palpitations. As of this AM he was remaining in NSR off Cardizem drip x 24 hrs, maintained well on metoprolol and Cardizem PO q6h. Switched Cardizem to long-acting. However, this afternoon, he developed recurrence of SVT to 130s but was asymptomatic with it. He was given metoprolol 5mg IVP x 1 and he returned to NSR with normal rate. He has been waiting for transfer to [place] for EP study/ablation as requested by daughter and accepted by Cardiology Dr. Johnson. Transfer Center aware. Patient and family are still interested in being transferred to [place] tomorrow unless he is in suitable condition to be discharged home instead.     ROS: All other systems reviewed and found to be negative with the exception of some physical deconditioning    Exam:  Vital Signs Last 24 Hrs  T(C): 36.9 (22 Aug 2022 08:38), Max: 36.9 (22 Aug 2022 08:38)  T(F): 98.5 (22 Aug 2022 08:38), Max: 98.5 (22 Aug 2022 08:38)  HR: 63 (22 Aug 2022 14:36) (55 - 140)  BP: 119/67 (22 Aug 2022 14:22) (102/71 - 142/80)  BP(mean): --  RR: 19 (22 Aug 2022 08:38) (18 - 19)  SpO2: 96% (22 Aug 2022 08:38) (95% - 96%)    Parameters below as of 22 Aug 2022 08:38  Patient On (Oxygen Delivery Method): room air    Gen: No acute distress  HEENT: NCAT PERRL EOMI MMM  Neck: Supple, no LAD, no JVD  CVS:  S1 S2 normal, RRR  Chest: Normal resp effort, lungs CTA b/L  Abd: +BS, non distended, soft, non tender, RUQ cholecystostomy drain site C/D/I, drainage bag contents bilious, non-bloody  Ext: No edema or calf tenderness  Skin: Warm, dry  Neuro: A+OX3, no deficits    Labs:                               10.6   6.33 )-----------( 351              32.4       138  |  107  |  6  ----------------------< 175  3.9   |   28   |  0.82    Ca 8.4  Phos 2.8   Mg 2.4        TPro  5.4  /  Alb  2.0  /  TBili  0.3  /  DBili  0.1  /  AST  25  /  ALT  29  /  AlkPhos  81     Urinalysis 8/13: Clear, N-, LE-, RBC 3-5, WBC 0-2, bact occ    Micro:  Bile fluid 8/16: GPCs on Gram's stain  Urine culture 8/13: Negative  Blood culture 8/13: Negative  Blood culture 8/13: Negative  Urine culture 8/12: Negative  Blood culture 8/11: Gram variable rods, still in process  Blood culture 8/11: Negative    Imaging:  NM HIDA scan 8/15: Prompt, homogeneous uptake of radiotracer by the hepatocytes. Activity is first seen in the bowel at 20 minutes. The gallbladder is not seen at any time during the study. There is good clearance of activity from the liver by the end of the study. Abnormal hepatobiliary scan. In the proper clinical setting, findings are consistent with acute cholecystitis.    Cardiac Testing:  (Prior admission)  Echo 8/12/22: The left ventricle is normal in size, wall motion, and contractility. Mild concentric left ventricular hypertrophy is present. Estimated left ventricular ejection fraction is 60-65 %. Normal appearing right ventricle structure and function. Mild aortic sclerosis is present with normal valvular opening. Mild to moderate aortic regurgitation is present. Mild mitral annular calcification is present. The mitral valve leaflets appear mildly thickened. Mild (1+) mitral regurgitation is present. The tricuspid valve leaflets are thin and pliable; valve motion is normal. Moderate (2+) eccentric tricuspid valve regurgitation is present. Moderate to severe pulmonary hypertension. Normal appearing pulmonic valve structure. Mild pulmonic valvular regurgitation (1+) is present. No evidence of pericardial effusion.    Procedures:  Cholecystostomy tube placement 8/16/22 by IR    Meds:  MEDICATIONS  (STANDING):  atorvastatin 10 milliGRAM(s) Oral at bedtime  clopidogrel Tablet 75 milliGRAM(s) Oral daily  cyanocobalamin 1000 MICROGram(s) Oral daily  diltiazem    milliGRAM(s) Oral daily  heparin   Injectable 5000 Unit(s) SubCutaneous every 12 hours  insulin lispro (ADMELOG) corrective regimen sliding scale   SubCutaneous at bedtime  insulin lispro (ADMELOG) corrective regimen sliding scale   SubCutaneous three times a day before meals  metoprolol succinate ER 25 milliGRAM(s) Oral every 12 hours  polyethylene glycol 3350 17 Gram(s) Oral daily  senna 2 Tablet(s) Oral at bedtime  tamsulosin 0.4 milliGRAM(s) Oral at bedtime    MEDICATIONS  (PRN):  acetaminophen     Tablet .. 650 milliGRAM(s) Oral every 6 hours PRN Temp greater or equal to 38C (100.4F), Mild Pain (1 - 3)  aluminum hydroxide/magnesium hydroxide/simethicone Suspension 30 milliLiter(s) Oral every 4 hours PRN Dyspepsia  dextrose Oral Gel 15 Gram(s) Oral once PRN Blood Glucose LESS THAN 70 milliGRAM(s)/deciliter  HYDROmorphone  Injectable 0.5 milliGRAM(s) IV Push every 3 hours PRN Severe Pain (7 - 10)  melatonin 3 milliGRAM(s) Oral at bedtime PRN Insomnia  ondansetron Injectable 4 milliGRAM(s) IV Push once PRN Nausea and/or Vomiting  oxyCODONE    IR 5 milliGRAM(s) Oral every 6 hours PRN Moderate Pain (4 - 6)

## 2022-08-22 NOTE — PROGRESS NOTE ADULT - SUBJECTIVE AND OBJECTIVE BOX
CHIEF COMPLAINT:  Presents because of +BCx from recent hospitalization    HPI: The patient is a 89-year-old male with history of CAD, DM2, HTN, HLD, BPH, prostate CA s/p rad was hospitalized on 22 and diagnosed with acute cholecystitis. He was recommended cholecystostomy because of his elevated cardiac risk and he refused. He was very upset about being made NPO as refused all treatments and wanted to go home. He was discharged home yesterday on Augmentin.  The patient was recalled to the emergency room because his blood culture grew gram variable rods.  The patient was started on IV Unasyn in the ER.  In the emergency room the patient developed sinus tachycardia/SVT with the elevated heart rate to 140. He was given coreg and the above antibiotics and SVT resolved.    22: Patient upset that he got coreg. He states he does not tolerate coreg because it causes him dizziness and only wants metoprolol. He also continues to refuse any procedures and just wanted to come to the hospital for the IV antibiotics. He is upset again that he is NPO.    He denies any fevers, chils, CP, palp, SOB, current abd pain, N/V, dizziness, syncope, orthopnea, PND        22 feeling well this AM-- predominantly in SR/SB        PMHx:  PAST MEDICAL & SURGICAL HISTORY:  BPH (benign prostatic hyperplasia)  HLD (hyperlipidemia)  GERD (gastroesophageal reflux disease)  Prostate cancer s/p radiation  CAD S/P percutaneous coronary angioplasty 2014  DM2  Pulm HTN    Social History:  Former smoker  Denies any significant etoh or any illicit drug use    FAMILY HISTORY:   FAMILY HISTORY:  Family history of MI (myocardial infarction) (Father)  Family history of uterine cancer (Mother)    ALLERGIES:  Allergies  No Known Allergies        Vital Signs Last 24 Hrs  T(C): 36.7 (22 Aug 2022 06:15), Max: 36.7 (21 Aug 2022 09:07)  T(F): 98.1 (22 Aug 2022 06:15), Max: 98.1 (22 Aug 2022 06:15)  HR: 60 (22 Aug 2022 06:15) (55 - 136)  BP: 142/80 (22 Aug 2022 06:15) (102/71 - 142/80)  BP(mean): --  RR: 18 (22 Aug 2022 06:15) (18 - 18)  SpO2: 95% (22 Aug 2022 06:15) (93% - 96%)    Parameters below as of 22 Aug 2022 06:15  Patient On (Oxygen Delivery Method): room air        PHYSICAL EXAM:   Constitutional: awake and alert in NAD  HEENT: EOMI, Normal Hearing, MMM  Neck: Soft and supple, No LAD, mild + JVD, no carotid bruit  Respiratory: Breath sounds are clear bilaterally, No wheezing, rales or rhonchi, good air movement  Cardiovascular: S1 and S2,tachy , soft systolic murmur at LSB. PMI is nondisplaced  Gastrointestinal: perc-lor drain  Extremities: Warm and well perfused, no peripheral edema  Neurological: A/O x 3, no focal deficits appreciated  Skin: No rashes appreciated    MEDICATIONS  (STANDING):  atorvastatin 10 milliGRAM(s) Oral at bedtime  clopidogrel Tablet 75 milliGRAM(s) Oral daily  cyanocobalamin 1000 MICROGram(s) Oral daily  dextrose 5%. 1000 milliLiter(s) (100 mL/Hr) IV Continuous <Continuous>  dextrose 5%. 1000 milliLiter(s) (50 mL/Hr) IV Continuous <Continuous>  dextrose 50% Injectable 25 Gram(s) IV Push once  dextrose 50% Injectable 12.5 Gram(s) IV Push once  dextrose 50% Injectable 25 Gram(s) IV Push once  diltiazem    milliGRAM(s) Oral daily  glucagon  Injectable 1 milliGRAM(s) IntraMuscular once  heparin   Injectable 5000 Unit(s) SubCutaneous every 12 hours  insulin lispro (ADMELOG) corrective regimen sliding scale   SubCutaneous at bedtime  insulin lispro (ADMELOG) corrective regimen sliding scale   SubCutaneous three times a day before meals  metoprolol succinate ER 25 milliGRAM(s) Oral every 12 hours  polyethylene glycol 3350 17 Gram(s) Oral daily  senna 2 Tablet(s) Oral at bedtime  tamsulosin 0.4 milliGRAM(s) Oral at bedtime    MEDICATIONS  (PRN):  acetaminophen     Tablet .. 650 milliGRAM(s) Oral every 6 hours PRN Temp greater or equal to 38C (100.4F), Mild Pain (1 - 3)  aluminum hydroxide/magnesium hydroxide/simethicone Suspension 30 milliLiter(s) Oral every 4 hours PRN Dyspepsia  dextrose Oral Gel 15 Gram(s) Oral once PRN Blood Glucose LESS THAN 70 milliGRAM(s)/deciliter  HYDROmorphone  Injectable 0.5 milliGRAM(s) IV Push every 3 hours PRN Severe Pain (7 - 10)  melatonin 3 milliGRAM(s) Oral at bedtime PRN Insomnia  ondansetron Injectable 4 milliGRAM(s) IV Push once PRN Nausea and/or Vomiting  oxyCODONE    IR 5 milliGRAM(s) Oral every 6 hours PRN Moderate Pain (4 - 6)          138  |  107  |  6<L>  ----------------------------<  175<H>  3.9   |  28  |  0.82    Ca    8.4<L>      21 Aug 2022 11:01  Phos  2.8       Mg     2.4                     BLOOD CULTURES: Organism --  Gram Stain Blood -- Gram Stain --  Specimen Source Clean Catch None  Culture-Blood --    Organism Blood Culture PCR  Gram Stain Blood -- Gram Stain   Growth in anaerobic bottle: Gram Variable Rods  Specimen Source .Blood None  Culture-Blood --    RADIOLOGY:    Reviewed in EMR    EK/13/22, my interpretation: NSR low voltage nonspecific ST changes     TELEMETRY:    Reviewed. SVT alt with SR    ECHO:    ACC: [id number] EXAM:  ECHO TTE WO CON COMP W DOPP                        PROCEDURE DATE:  2022      Indications   1) Z01.810 Encounter for preprocedural cardiovascular examination    M-Mode Measurements (cm)     LVEDd: 4.8 cm          LVESd: 3.02 cm   IVSEd: 1.23 cm   LVPWd: 1.13 cm            AO Root Dimension: 3.5 cm                             ACS: 1.9 cm                             LA: 3.8 cm    Doppler Measurements:     AV Velocity:146 cm/s               MV Peak E-Wave: 121 cm/s   AV Peak Gradient: 8.53 mmHg        MV Peak A-Wave: 122 cm/s                                      MV E/A Ratio: 0.99 %   TR Velocity:383 cm/s               MV Peak Gradient: 5.86 mmHg   TR Gradient:58.6756 mmHg   Estimated RAP:8 mmHg   RVSP:67 mmHg     Summary     The left ventricle is normal in size, wall motion, and contractility.   Mild concentric left ventricular hypertrophy is present.   Estimated left ventricular ejection fraction is 60-65 %.   Normal appearing right ventricle structure and function.   Mild aortic sclerosis is present with normal valvular opening.   Mild to moderate aortic regurgitation is present.   Mild mitral annular calcification is present.   The mitral valve leaflets appear mildly thickened.   Mild (1+) mitral regurgitation is present.   The tricuspid valve leaflets are thin and pliable; valve motion is normal.   Moderate (2+) eccentric tricuspid valve regurgitation is present.   Moderate to severe pulmonary hypertension.   Normal appearing pulmonic valve structure.   Mild pulmonic valvular regurgitation (1+) is present.   No evidence of pericardial effusion.

## 2022-08-22 NOTE — PROGRESS NOTE ADULT - ASSESSMENT
89 yo M with above PMHx who presents was recently admitted for acute cholecystitis refusing cholecystostomy who presents because of positive BCx. Course complicated by SVT    -SVT intermittent - likely from infection / physiologic strain- in SR/SB  with TOprol BID and Cardizen PO dosing- Amio was ordered but refused-- with bradycardia at times - difficult to further titrate medications- some element of SSS  Continue to monitor on tele for now- HR 50-80 Sinus-- if he remains james- would decrease cardizem dosing-- if he goes back into SVT- would strongly consider use of amio  not urgent now, if patient continues to have alternating episodes of SB and SVT,, may benefit from PPM     s/p IR drain placement for GB-- on abx    CAD - ( hx of) now with Minimal elevation in CE,  likely due to physiologic strain - continue plavix  and statin - If need for surgery, and surgery requires patient to be off of plavix. would transition temporarily to Asa 81    Ideally should have ischemic work up when stable, but patient has repeated refused- will discuss as an outpatient        will continue to follow

## 2022-08-22 NOTE — PROVIDER CONTACT NOTE (CRITICAL VALUE NOTIFICATION) - TEST AND RESULT REPORTED:
+ Body fluid culture from 8/16 No growth at 5 days Upon reevaluation of gram stain please note this a a corrected report No polymorphonuclearcells seen per Low power field No organisms seem per oil powerfield Previously reported as no polymorpho nuclear leuckocytes seen per low power field rare gram + cocci in pairs seen per oil power fields Final no growth Corrected report
body fluid culture from 8/16- no polymorphonuclear leukocytes seen per low power field.  Rare gram + cocci in pairs seen per oil power field
Troponin 163.98

## 2022-08-23 ENCOUNTER — TRANSCRIPTION ENCOUNTER (OUTPATIENT)
Age: 87
End: 2022-08-23

## 2022-08-23 VITALS — HEART RATE: 65 BPM

## 2022-08-23 LAB
ANION GAP SERPL CALC-SCNC: 5 MMOL/L — SIGNIFICANT CHANGE UP (ref 5–17)
BUN SERPL-MCNC: 8 MG/DL — SIGNIFICANT CHANGE UP (ref 7–23)
CALCIUM SERPL-MCNC: 8.5 MG/DL — SIGNIFICANT CHANGE UP (ref 8.5–10.1)
CHLORIDE SERPL-SCNC: 108 MMOL/L — SIGNIFICANT CHANGE UP (ref 96–108)
CO2 SERPL-SCNC: 26 MMOL/L — SIGNIFICANT CHANGE UP (ref 22–31)
CREAT SERPL-MCNC: 0.72 MG/DL — SIGNIFICANT CHANGE UP (ref 0.5–1.3)
EGFR: 87 ML/MIN/1.73M2 — SIGNIFICANT CHANGE UP
GLUCOSE SERPL-MCNC: 121 MG/DL — HIGH (ref 70–99)
HCT VFR BLD CALC: 33 % — LOW (ref 39–50)
HGB BLD-MCNC: 10.5 G/DL — LOW (ref 13–17)
MCHC RBC-ENTMCNC: 28.1 PG — SIGNIFICANT CHANGE UP (ref 27–34)
MCHC RBC-ENTMCNC: 31.8 GM/DL — LOW (ref 32–36)
MCV RBC AUTO: 88.2 FL — SIGNIFICANT CHANGE UP (ref 80–100)
PLATELET # BLD AUTO: 402 K/UL — HIGH (ref 150–400)
POTASSIUM SERPL-MCNC: 4.2 MMOL/L — SIGNIFICANT CHANGE UP (ref 3.5–5.3)
POTASSIUM SERPL-SCNC: 4.2 MMOL/L — SIGNIFICANT CHANGE UP (ref 3.5–5.3)
RBC # BLD: 3.74 M/UL — LOW (ref 4.2–5.8)
RBC # FLD: 14 % — SIGNIFICANT CHANGE UP (ref 10.3–14.5)
SARS-COV-2 RNA SPEC QL NAA+PROBE: SIGNIFICANT CHANGE UP
SODIUM SERPL-SCNC: 139 MMOL/L — SIGNIFICANT CHANGE UP (ref 135–145)
WBC # BLD: 7.88 K/UL — SIGNIFICANT CHANGE UP (ref 3.8–10.5)
WBC # FLD AUTO: 7.88 K/UL — SIGNIFICANT CHANGE UP (ref 3.8–10.5)

## 2022-08-23 PROCEDURE — 12345: CPT | Mod: NC

## 2022-08-23 RX ADMIN — Medication 25 MILLIGRAM(S): at 14:03

## 2022-08-23 RX ADMIN — Medication 1 TABLET(S): at 21:07

## 2022-08-23 RX ADMIN — HEPARIN SODIUM 5000 UNIT(S): 5000 INJECTION INTRAVENOUS; SUBCUTANEOUS at 21:07

## 2022-08-23 RX ADMIN — TAMSULOSIN HYDROCHLORIDE 0.4 MILLIGRAM(S): 0.4 CAPSULE ORAL at 21:07

## 2022-08-23 RX ADMIN — Medication 1 TABLET(S): at 09:48

## 2022-08-23 RX ADMIN — POLYETHYLENE GLYCOL 3350 17 GRAM(S): 17 POWDER, FOR SOLUTION ORAL at 09:49

## 2022-08-23 RX ADMIN — Medication 25 MILLIGRAM(S): at 21:07

## 2022-08-23 RX ADMIN — ATORVASTATIN CALCIUM 10 MILLIGRAM(S): 80 TABLET, FILM COATED ORAL at 21:08

## 2022-08-23 RX ADMIN — Medication 1 TABLET(S): at 21:08

## 2022-08-23 RX ADMIN — CLOPIDOGREL BISULFATE 75 MILLIGRAM(S): 75 TABLET, FILM COATED ORAL at 09:48

## 2022-08-23 RX ADMIN — PREGABALIN 1000 MICROGRAM(S): 225 CAPSULE ORAL at 09:52

## 2022-08-23 RX ADMIN — HEPARIN SODIUM 5000 UNIT(S): 5000 INJECTION INTRAVENOUS; SUBCUTANEOUS at 09:49

## 2022-08-23 NOTE — PROGRESS NOTE ADULT - ASSESSMENT
89-year-old man with DM, HTN, HLD, CAD, BPH, prostate CA s/p radiation treatment, initially hospitalized on 8/11/22 and diagnosed with acute cholecystitis. He declined interventions and was discharged on PO antibiotics, called back on 8/13 for positive blood culture (Gram variable rods). In the ED he was afebrile and fairly asymptomatic aside from some slight RUQ discomfort. Labs were notable for leukocytosis and elevated lactate. He was placed on Zosyn and admitted to Medicine.     Sepsis due to acute cholecystitis  Resolved. Patient called back to hospital for Bld Cx 1 of 2 specimens 8/11 (+) for gram variable rods, unclear if true bacteremia.   Patient was placed on Zosyn empirically. Further workup with HIDA scan 8/15 indicated acute cholecystitis. Patient underwent cholecystostomy drain placement by IR 8/16.  Patient is now markedly improved. No pain. No fever. Tolerating regular diet. Leukocytosis resolved. Repeat blood cultures negative to date.   - Finsihed couruse of IV Zosyn - started on  PO Augmentin for 14 days - D/W Dr. Javier   - Continue with cholecystostomy drain, monitor output. Plan to keep drain in-place upon discharge with patient to return to IR as outpatient for tube study in 6 weeks or so.   IR outpatient [phone]  Dressing should be changed every 2-3 days or whenever soiled.   No need to flush unless clogged.   IR advised that patient should have referral for the tube study via Surgery Team that was following patient, that is, via Dr. Murray.     Intermittent episodes of SVT  - s/p cardizem drip. Patient nonetheless continued to have bursts of 's - got cardizem 10 mg ivp on 08/22 with resolution of SVTs  - Continue PO toprol xl 25 mg po bid and PO Cardizem  mg   - decrease cardizem dose if bradycardic, start amiodarone if prolonged SVTs - D/W Dr. Luna   - Monitor lytes  - Continue to monitor on tele  - For transfer to [place] 8/22 (Dr. Johnson) - pt. wants to go home if no bed available     Elevated troponin   As noted upon admission. Likely demand ischemia in setting on infection. No regional wall motion abnormality on echo.   should have ischemic work up when stable, but patient has repeatedly refused  - Continue metoprolol, clopidogrel, statin  - Outpatient follow up with Cardiology    CAD, HTN, HLD  No angina or CHF sx. BP stable. Appears euvolemic.   - continue metoprolol, diltiazem, clopidogrel, statin    DM2  A1c 6.6. Glucose 130s  - Monitor glucose  - Continue insulin    Hypovolemic hyponatremia  Resolved      Dispo: For transfer to [facility], accepted by Cardiology Dr. Felix Johnson - when bed available - pt. will be dc home  if still no bed

## 2022-08-23 NOTE — PROGRESS NOTE ADULT - TIME BILLING
Discussion of care plan, workup and potential tx with pt, wife and daughter
see above
Discussion of care plan, workup and potential tx with pt, wife and daughter
see above
Discussion of care plan, workup and potential tx with pt, wife and daughter
see above
Discussion of care plan, workup and potential tx with pt, wife and daughter
see above

## 2022-08-23 NOTE — PROGRESS NOTE ADULT - ASSESSMENT
89 yo M with above PMHx who presents was recently admitted for acute cholecystitis refusing cholecystostomy who presents because of positive BCx. Course complicated by SVT    -SVT intermittent - likely from infection / physiologic strain- in SR/SB  with TOprol BID and Cardizen PO dosing- Amio was ordered but refused--   with bradycardia at times - difficult to further titrate medications- some element of SSS is present  Continue to monitor on tele for now- HR 50-80 Sinus-- if he remains james- would decrease cardizem dosing-- if he goes back into SVT- would strongly consider use of amio    not urgent now, if patient continues to have alternating episodes of SB and SVT,, may benefit from PPM     s/p IR drain placement for GB-- on abx- long term plan for surgery    CAD - ( hx of) now with Minimal elevation in CE,  likely due to physiologic strain - continue plavix  and statin - If need for surgery, and surgery requires patient to be off of plavix. would transition temporarily to Asa 81    Ideally should have ischemic work up when stable, but patient has repeated refused- will discuss as an outpatient        will continue to follow

## 2022-08-23 NOTE — PROGRESS NOTE ADULT - SUBJECTIVE AND OBJECTIVE BOX
CHIEF COMPLAINT:  Presents because of +BCx from recent hospitalization    HPI: The patient is a 89-year-old male with history of CAD, DM2, HTN, HLD, BPH, prostate CA s/p rad was hospitalized on 22 and diagnosed with acute cholecystitis. He was recommended cholecystostomy because of his elevated cardiac risk and he refused. He was very upset about being made NPO as refused all treatments and wanted to go home. He was discharged home yesterday on Augmentin.  The patient was recalled to the emergency room because his blood culture grew gram variable rods.  The patient was started on IV Unasyn in the ER.  In the emergency room the patient developed sinus tachycardia/SVT with the elevated heart rate to 140. He was given coreg and the above antibiotics and SVT resolved.    22: Patient upset that he got coreg. He states he does not tolerate coreg because it causes him dizziness and only wants metoprolol. He also continues to refuse any procedures and just wanted to come to the hospital for the IV antibiotics. He is upset again that he is NPO.    He denies any fevers, chils, CP, palp, SOB, current abd pain, N/V, dizziness, syncope, orthopnea, PND        22 feeling well this AM-- predominantly in SR/SB        PMHx:  PAST MEDICAL & SURGICAL HISTORY:  BPH (benign prostatic hyperplasia)  HLD (hyperlipidemia)  GERD (gastroesophageal reflux disease)  Prostate cancer s/p radiation  CAD S/P percutaneous coronary angioplasty 2014  DM2  Pulm HTN    Social History:  Former smoker  Denies any significant etoh or any illicit drug use    FAMILY HISTORY:   FAMILY HISTORY:  Family history of MI (myocardial infarction) (Father)  Family history of uterine cancer (Mother)    ALLERGIES:  Allergies  No Known Allergies    Vital Signs Last 24 Hrs  T(C): 36.4 (22 Aug 2022 21:13), Max: 36.9 (22 Aug 2022 08:38)  T(F): 97.5 (22 Aug 2022 21:13), Max: 98.5 (22 Aug 2022 08:38)  HR: 63 (22 Aug 2022 21:13) (58 - 140)  BP: 126/46 (22 Aug 2022 21:13) (119/67 - 142/51)  BP(mean): --  RR: 18 (22 Aug 2022 21:13) (18 - 19)  SpO2: 94% (22 Aug 2022 21:13) (94% - 97%)    Parameters below as of 22 Aug 2022 21:13  Patient On (Oxygen Delivery Method): room air        PHYSICAL EXAM:   Constitutional: awake and alert in NAD  HEENT: EOMI, Normal Hearing, MMM  Neck: Soft and supple, No LAD, mild + JVD, no carotid bruit  Respiratory: Breath sounds are clear bilaterally, No wheezing, rales or rhonchi, good air movement  Cardiovascular: S1 and S2,tachy , soft systolic murmur at LSB. PMI is nondisplaced  Gastrointestinal: perc-lor drain  Extremities: Warm and well perfused, no peripheral edema  Neurological: A/O x 3, no focal deficits appreciated  Skin: No rashes appreciated          MEDICATIONS  (STANDING):  amoxicillin  875 milliGRAM(s)/clavulanate 1 Tablet(s) Oral every 12 hours  atorvastatin 10 milliGRAM(s) Oral at bedtime  clopidogrel Tablet 75 milliGRAM(s) Oral daily  cyanocobalamin 1000 MICROGram(s) Oral daily  dextrose 5%. 1000 milliLiter(s) (50 mL/Hr) IV Continuous <Continuous>  dextrose 5%. 1000 milliLiter(s) (100 mL/Hr) IV Continuous <Continuous>  dextrose 50% Injectable 25 Gram(s) IV Push once  dextrose 50% Injectable 12.5 Gram(s) IV Push once  dextrose 50% Injectable 25 Gram(s) IV Push once  diltiazem    milliGRAM(s) Oral daily  glucagon  Injectable 1 milliGRAM(s) IntraMuscular once  heparin   Injectable 5000 Unit(s) SubCutaneous every 12 hours  insulin lispro (ADMELOG) corrective regimen sliding scale   SubCutaneous at bedtime  insulin lispro (ADMELOG) corrective regimen sliding scale   SubCutaneous three times a day before meals  lactobacillus acidophilus 1 Tablet(s) Oral two times a day  metoprolol succinate ER 25 milliGRAM(s) Oral every 12 hours  polyethylene glycol 3350 17 Gram(s) Oral daily  senna 2 Tablet(s) Oral at bedtime  tamsulosin 0.4 milliGRAM(s) Oral at bedtime    MEDICATIONS  (PRN):  acetaminophen     Tablet .. 650 milliGRAM(s) Oral every 6 hours PRN Temp greater or equal to 38C (100.4F), Mild Pain (1 - 3)  aluminum hydroxide/magnesium hydroxide/simethicone Suspension 30 milliLiter(s) Oral every 4 hours PRN Dyspepsia  dextrose Oral Gel 15 Gram(s) Oral once PRN Blood Glucose LESS THAN 70 milliGRAM(s)/deciliter  HYDROmorphone  Injectable 0.5 milliGRAM(s) IV Push every 3 hours PRN Severe Pain (7 - 10)  melatonin 3 milliGRAM(s) Oral at bedtime PRN Insomnia  ondansetron Injectable 4 milliGRAM(s) IV Push once PRN Nausea and/or Vomiting  oxyCODONE    IR 5 milliGRAM(s) Oral every 6 hours PRN Moderate Pain (4 - 6)        08-    138  |  107  |  6<L>  ----------------------------<  175<H>  3.9   |  28  |  0.82    Ca    8.4<L>      21 Aug 2022 11:01  Phos  2.8       Mg     2.4                     BLOOD CULTURES: Organism --  Gram Stain Blood -- Gram Stain --  Specimen Source Clean Catch None  Culture-Blood --    Organism Blood Culture PCR  Gram Stain Blood -- Gram Stain   Growth in anaerobic bottle: Gram Variable Rods  Specimen Source .Blood None  Culture-Blood --    RADIOLOGY:    Reviewed in EMR    EK/13/22, my interpretation: NSR low voltage nonspecific ST changes     TELEMETRY:    Reviewed. SVT alt with SR    ECHO:    ACC: [id number] EXAM:  ECHO TTE WO CON COMP W DOPP                        PROCEDURE DATE:  2022      Indications   1) Z01.810 Encounter for preprocedural cardiovascular examination    M-Mode Measurements (cm)     LVEDd: 4.8 cm          LVESd: 3.02 cm   IVSEd: 1.23 cm   LVPWd: 1.13 cm            AO Root Dimension: 3.5 cm                             ACS: 1.9 cm                             LA: 3.8 cm    Doppler Measurements:     AV Velocity:146 cm/s               MV Peak E-Wave: 121 cm/s   AV Peak Gradient: 8.53 mmHg        MV Peak A-Wave: 122 cm/s                                      MV E/A Ratio: 0.99 %   TR Velocity:383 cm/s               MV Peak Gradient: 5.86 mmHg   TR Gradient:58.6756 mmHg   Estimated RAP:8 mmHg   RVSP:67 mmHg     Summary     The left ventricle is normal in size, wall motion, and contractility.   Mild concentric left ventricular hypertrophy is present.   Estimated left ventricular ejection fraction is 60-65 %.   Normal appearing right ventricle structure and function.   Mild aortic sclerosis is present with normal valvular opening.   Mild to moderate aortic regurgitation is present.   Mild mitral annular calcification is present.   The mitral valve leaflets appear mildly thickened.   Mild (1+) mitral regurgitation is present.   The tricuspid valve leaflets are thin and pliable; valve motion is normal.   Moderate (2+) eccentric tricuspid valve regurgitation is present.   Moderate to severe pulmonary hypertension.   Normal appearing pulmonic valve structure.   Mild pulmonic valvular regurgitation (1+) is present.   No evidence of pericardial effusion.

## 2022-08-23 NOTE — PROGRESS NOTE ADULT - SUBJECTIVE AND OBJECTIVE BOX
Chief Complaint: Called back for positive blood cultures    Interval Hx: Patient seen and examined. Patient feeling well. Asymptomatic now. No fevers. No chills. No abdominal pain, nausea or any other GI complaints. Tolerating regular diet well. No chest complaints. No dizziness, lightheadedness or palpitations. As of this AM he was remaining in NSR off Cardizem drip x 24 hrs, maintained well on metoprolol and Cardizem PO q6h. Switched Cardizem to long-acting. However, this afternoon, he developed recurrence of SVT to 130s but was asymptomatic with it. He was given metoprolol 5mg IVP x 1 and he returned to NSR with normal rate. He has been waiting for transfer to [place] for EP study/ablation as requested by daughter and accepted by Cardiology Dr. Johnson. Transfer Center aware. Patient and family are still interested in being transferred to [place] tomorrow unless he is in suitable condition to be discharged home instead.     ROS: All other systems reviewed and found to be negative with the exception of some physical deconditioning    Exam:  Vital Signs Last 24 Hrs  T(C): 36.9 (23 Aug 2022 08:54), Max: 36.9 (23 Aug 2022 08:54)  T(F): 98.4 (23 Aug 2022 08:54), Max: 98.4 (23 Aug 2022 08:54)  HR: 64 (23 Aug 2022 08:54) (61 - 140)  BP: 146/49 (23 Aug 2022 08:54) (119/67 - 146/49)  BP(mean): --  RR: 18 (23 Aug 2022 08:54) (18 - 18)  SpO2: 96% (23 Aug 2022 08:54) (94% - 97%)  Gen: No acute distress  HEENT: NCAT PERRL EOMI MMM  Neck: Supple, no LAD, no JVD  CVS:  S1 S2 normal, RRR  Chest: Normal resp effort, lungs CTA b/L  Abd: +BS, non distended, soft, non tender, RUQ cholecystostomy drain site C/D/I, drainage bag contents bilious, non-bloody  Ext: No edema or calf tenderness  Skin: Warm, dry  Neuro: A+OX3, no deficits    Labs:                               10.6   6.33 )-----------( 351              32.4       138  |  107  |  6  ----------------------< 175  3.9   |   28   |  0.82    Ca 8.4  Phos 2.8   Mg 2.4        TPro  5.4  /  Alb  2.0  /  TBili  0.3  /  DBili  0.1  /  AST  25  /  ALT  29  /  AlkPhos  81     Urinalysis 8/13: Clear, N-, LE-, RBC 3-5, WBC 0-2, bact occ    Micro:  Bile fluid 8/16: GPCs on Gram's stain  Urine culture 8/13: Negative  Blood culture 8/13: Negative  Blood culture 8/13: Negative  Urine culture 8/12: Negative  Blood culture 8/11: Gram variable rods, still in process  Blood culture 8/11: Negative    Imaging:  NM HIDA scan 8/15: Prompt, homogeneous uptake of radiotracer by the hepatocytes. Activity is first seen in the bowel at 20 minutes. The gallbladder is not seen at any time during the study. There is good clearance of activity from the liver by the end of the study. Abnormal hepatobiliary scan. In the proper clinical setting, findings are consistent with acute cholecystitis.    Cardiac Testing:  (Prior admission)  Echo 8/12/22: The left ventricle is normal in size, wall motion, and contractility. Mild concentric left ventricular hypertrophy is present. Estimated left ventricular ejection fraction is 60-65 %. Normal appearing right ventricle structure and function. Mild aortic sclerosis is present with normal valvular opening. Mild to moderate aortic regurgitation is present. Mild mitral annular calcification is present. The mitral valve leaflets appear mildly thickened. Mild (1+) mitral regurgitation is present. The tricuspid valve leaflets are thin and pliable; valve motion is normal. Moderate (2+) eccentric tricuspid valve regurgitation is present. Moderate to severe pulmonary hypertension. Normal appearing pulmonic valve structure. Mild pulmonic valvular regurgitation (1+) is present. No evidence of pericardial effusion.    Procedures:  Cholecystostomy tube placement 8/16/22 by IR    Meds:  MEDICATIONS  (STANDING):  atorvastatin 10 milliGRAM(s) Oral at bedtime  clopidogrel Tablet 75 milliGRAM(s) Oral daily  cyanocobalamin 1000 MICROGram(s) Oral daily  diltiazem    milliGRAM(s) Oral daily  heparin   Injectable 5000 Unit(s) SubCutaneous every 12 hours  insulin lispro (ADMELOG) corrective regimen sliding scale   SubCutaneous at bedtime  insulin lispro (ADMELOG) corrective regimen sliding scale   SubCutaneous three times a day before meals  metoprolol succinate ER 25 milliGRAM(s) Oral every 12 hours  polyethylene glycol 3350 17 Gram(s) Oral daily  senna 2 Tablet(s) Oral at bedtime  tamsulosin 0.4 milliGRAM(s) Oral at bedtime    MEDICATIONS  (PRN):  acetaminophen     Tablet .. 650 milliGRAM(s) Oral every 6 hours PRN Temp greater or equal to 38C (100.4F), Mild Pain (1 - 3)  aluminum hydroxide/magnesium hydroxide/simethicone Suspension 30 milliLiter(s) Oral every 4 hours PRN Dyspepsia  dextrose Oral Gel 15 Gram(s) Oral once PRN Blood Glucose LESS THAN 70 milliGRAM(s)/deciliter  HYDROmorphone  Injectable 0.5 milliGRAM(s) IV Push every 3 hours PRN Severe Pain (7 - 10)  melatonin 3 milliGRAM(s) Oral at bedtime PRN Insomnia  ondansetron Injectable 4 milliGRAM(s) IV Push once PRN Nausea and/or Vomiting  oxyCODONE    IR 5 milliGRAM(s) Oral every 6 hours PRN Moderate Pain (4 - 6)

## 2022-08-23 NOTE — DISCHARGE NOTE NURSING/CASE MANAGEMENT/SOCIAL WORK - NSDCPEFALRISK_GEN_ALL_CORE
For information on Fall & Injury Prevention, visit: [email or web address] OR  [email or web address] OR  https://www.cdc.gov/steadi/patient.html

## 2022-08-23 NOTE — PROGRESS NOTE ADULT - REASON FOR ADMISSION
Acute cholecystitis
Acute cholecystitis
Bacteremia, cholecystitis, tachycardia
Sepsis with bacteremia due to acute cholecystitis
Acute cholecystitis
Bacteremia, cholecystitis, tachycardia
Acute cholecystitis, bacteremia
Bacteremia, cholecystitis, tachycardia
Bacteremia, cholecystitis, tachycardia

## 2022-08-23 NOTE — PROGRESS NOTE ADULT - PROVIDER SPECIALTY LIST ADULT
Critical Care
Electrophysiology
Hospitalist
Cardiology
Hospitalist
Hospitalist
Infectious Disease
Cardiology
Cardiology
Hospitalist
Hospitalist
Infectious Disease
Cardiology
Cardiology
Hospitalist

## 2022-08-27 DIAGNOSIS — K81.0 ACUTE CHOLECYSTITIS: ICD-10-CM

## 2022-08-27 DIAGNOSIS — N40.0 BENIGN PROSTATIC HYPERPLASIA WITHOUT LOWER URINARY TRACT SYMPTOMS: ICD-10-CM

## 2022-08-27 DIAGNOSIS — I24.8 OTHER FORMS OF ACUTE ISCHEMIC HEART DISEASE: ICD-10-CM

## 2022-08-27 DIAGNOSIS — A41.9 SEPSIS, UNSPECIFIED ORGANISM: ICD-10-CM

## 2022-08-27 DIAGNOSIS — E11.9 TYPE 2 DIABETES MELLITUS WITHOUT COMPLICATIONS: ICD-10-CM

## 2022-08-27 DIAGNOSIS — E86.1 HYPOVOLEMIA: ICD-10-CM

## 2022-08-27 DIAGNOSIS — E87.1 HYPO-OSMOLALITY AND HYPONATREMIA: ICD-10-CM

## 2022-08-27 DIAGNOSIS — I27.20 PULMONARY HYPERTENSION, UNSPECIFIED: ICD-10-CM

## 2022-08-27 DIAGNOSIS — Z79.84 LONG TERM (CURRENT) USE OF ORAL HYPOGLYCEMIC DRUGS: ICD-10-CM

## 2022-08-27 DIAGNOSIS — Z85.46 PERSONAL HISTORY OF MALIGNANT NEOPLASM OF PROSTATE: ICD-10-CM

## 2022-08-27 DIAGNOSIS — I25.10 ATHEROSCLEROTIC HEART DISEASE OF NATIVE CORONARY ARTERY WITHOUT ANGINA PECTORIS: ICD-10-CM

## 2022-08-27 DIAGNOSIS — I47.1 SUPRAVENTRICULAR TACHYCARDIA: ICD-10-CM

## 2022-08-27 DIAGNOSIS — I10 ESSENTIAL (PRIMARY) HYPERTENSION: ICD-10-CM

## 2022-08-27 DIAGNOSIS — E78.5 HYPERLIPIDEMIA, UNSPECIFIED: ICD-10-CM

## 2022-10-14 RX ORDER — CLOPIDOGREL BISULFATE 75 MG/1
75 TABLET, FILM COATED ORAL DAILY
Qty: 90 | Refills: 0 | Status: ACTIVE | COMMUNITY
Start: 2017-10-24 | End: 1900-01-01

## 2022-11-26 NOTE — PROGRESS NOTE ADULT - ASSESSMENT
89-year-old male with history of CAD, DM2, HTN, HLD, BPH,  s/p rad.Tx for prostate CA was hospitalized on 8/11/22 and diagnosed with acute cholecystitis.  The patient was a high risk for laparoscopic cholecystectomy because of his cardiac condition and cholecystostomy tube placement was offered.  The patient declined the procedure and was discharged home day prior to admit on Augmentin.  The patient was recalled to the emergency room because his blood culture grew gram variable rods.  The patient was started on IV Unasyn in the ER.  In the emergency room the patient developed sinus tachycardia with the elevated heart rate to 140.  Patient denies chest pain or abdominal pain nausea vomiting.     1. acute cholecystitis. bacteremia - gram variable rods  - blood 1 bottle 8/11 growing gram variable rods ? contaminant vs gi tract translocation f/u id  - repeat blood cx no growth 8/13, 8/15  - s/p perc cholecystostomy placement g/s gpc pairs f/u cx   - on IV zosyn #5  - continue with abx coverage  - po abx on dc - po augmentin 10-14 day course  - monitor temps  - tolerating abx well so far; no side effects noted  - reason for abx use and side effects reviewed with patient  - supportive care  - fu cbc    2. other issues - care per medicine  impaired balance/impaired coordination/impaired motor control/impaired postural control/decreased strength

## 2023-01-01 NOTE — PROGRESS NOTE ADULT - SUBJECTIVE AND OBJECTIVE BOX
[facility]   Intensive Care Note    Name: (Female-Kylie Montero)        MRN [id number]  Parents:  Kylie Montero  YOB: infant born at [facility].     The infant CHIEF COMPLAINT: Patient is a 86y old  Male who presents with a chief complaint of cough, lethargy, passing out (08 Feb 2018 20:02)      HPI:  87 y/o male has productive cough, started few days ago, gradually getting worse, productive of sputum which has just turned to yellow, associated with painful swallowing as his throats hurts during swallowing, not eating properly since last couple of days, getting weaker. on 2/8 went to see his pmd and was prescribed tamiflu after diagnosing him with it, went home and was feeling lethargic. While he was eating he put his head on dinner table and was not able to respond to his wife and brought here.   -no aggravating factor, no relieving factor for above, here in ER fever noted (08 Feb 2018 20:02)    2/10-  SVTwith rates in the 140s, feels palpitatiosn    2/11 no further SVT      PMHx: PAST MEDICAL & SURGICAL HISTORY:  CAD S/P percutaneous coronary angioplasty  Prostate cancer: s/p radiation  Palpitations  NSVT (nonsustained ventricular tachycardia)  GERD (gastroesophageal reflux disease)  HLD (hyperlipidemia)  HTN (hypertension)  BPH (benign prostatic hyperplasia)  S/P cardiac catheterization: diagnostic 12/15/14        Soc Hx:  no toxic habits      Allergies: Allergies    No Known Allergies    Intolerances          REVIEW OF SYSTEMS:    CONSTITUTIONAL: No weakness, fevers or chills  EYES/ENT: No visual changes;  No vertigo or throat pain   NECK: No pain or stiffness  RESPIRATORY: No cough, wheezing, hemoptysis; No shortness of breath  CARDIOVASCULAR: No chest pain or palpitations  GASTROINTESTINAL: No abdominal or epigastric pain. No nausea, vomiting, or hematemesis; No diarrhea or constipation. No melena or hematochezia.  GENITOURINARY: No dysuria, frequency or hematuria  NEUROLOGICAL: No numbness or weakness  SKIN: No itching, burning, rashes, or lesions   All other review of systems is negative unless indicated above    ICU Vital Signs Last 24 Hrs  T(C): 36.8 (11 Feb 2018 05:20), Max: 36.9 (10 Feb 2018 16:33)  T(F): 98.3 (11 Feb 2018 05:20), Max: 98.4 (10 Feb 2018 16:33)  HR: 58 (11 Feb 2018 05:20) (58 - 130)  BP: 132/43 (11 Feb 2018 05:20) (91/50 - 132/43)  BP(mean): --  ABP: --  ABP(mean): --  RR: 18 (10 Feb 2018 16:33) (17 - 18)  SpO2: 98% (11 Feb 2018 05:20) (97% - 98%)        PHYSICAL EXAM:   Constitutional: NAD, awake and alert, well-developed  HEENT: PERR, EOMI, Normal Hearing, MMM  Neck: Soft and supple, No LAD, No JVD  Respiratory: Breath sounds are clear bilaterally, No wheezing, rales or rhonchi  Cardiovascular: S1 and S2, tachy  Gastrointestinal: Bowel Sounds present, soft, nontender, nondistended, no guarding, no rebound  Extremities: No peripheral edema  Vascular: 2+ peripheral pulses  Neurological: A/O x 3, no focal deficits  Musculoskeletal: 5/5 strength b/l upper and lower extremities  Skin: No rashes    MEDICATIONS  (STANDING):  atorvastatin 40 milliGRAM(s) Oral at bedtime  azithromycin  IVPB 500 milliGRAM(s) IV Intermittent every 24 hours  azithromycin  IVPB      cefTRIAXone Injectable.      cefTRIAXone Injectable. 1000 milliGRAM(s) IV Push every 24 hours  clopidogrel Tablet 75 milliGRAM(s) Oral daily  enoxaparin Injectable 40 milliGRAM(s) SubCutaneous every 24 hours  nystatin    Suspension 158037 Unit(s) Swish and Swallow three times a day  oseltamivir 30 milliGRAM(s) Oral every 12 hours  sodium chloride 0.9%. 1000 milliLiter(s) (75 mL/Hr) IV Continuous <Continuous>  tamsulosin 0.4 milliGRAM(s) Oral at bedtime      LABS: All Labs Reviewed:                                   10.6   5.6   )-----------( 166      ( 10 Feb 2018 05:26 )             32.2   02-11    141  |  111<H>  |  7   ----------------------------<  106<H>  3.4<L>   |  26  |  0.62    Ca    7.3<L>      11 Feb 2018 06:34  Mg     2.1     02-10          EKG: SR, normal axis nad intervals no significant ST changes     Telemetry: SR on 3/27/Broussard to attend the delivery of this , female infant lactation specialist and dietician  - Dietician 2023    PCO2 74 (H) 2023    PO2 55 (L) 2023    HCO3 23 2023    HGB 15.5 2023    HCT 46.6 2023     2023    ANEU 3.1 Lgl Grd   LEW MACKENZIE [phone] Mother    TRENT BECK [phone] Parent       Family lives in [place], [place].  Updated on admission.    PCPs:   Infant PCP: No primary care provider on file.  Maternal OB PCP: Danielle Dahl MD  MFM: Cleopatra Correia MD  Delivering Provider: Kaley Broussard Danielle Hooper MD    Attending Neonatologist:  

## 2023-01-27 ENCOUNTER — APPOINTMENT (OUTPATIENT)
Dept: UROLOGY | Facility: CLINIC | Age: 88
End: 2023-01-27

## 2023-02-09 ENCOUNTER — APPOINTMENT (OUTPATIENT)
Dept: UROLOGY | Facility: CLINIC | Age: 88
End: 2023-02-09
Payer: MEDICARE

## 2023-02-09 VITALS
TEMPERATURE: 97.4 F | HEART RATE: 70 BPM | HEIGHT: 67 IN | DIASTOLIC BLOOD PRESSURE: 58 MMHG | SYSTOLIC BLOOD PRESSURE: 156 MMHG | WEIGHT: 144 LBS | OXYGEN SATURATION: 95 % | RESPIRATION RATE: 14 BRPM | BODY MASS INDEX: 22.6 KG/M2

## 2023-02-09 PROCEDURE — 99204 OFFICE O/P NEW MOD 45 MIN: CPT

## 2023-02-10 LAB
APPEARANCE: CLEAR
BACTERIA: NEGATIVE
BILIRUBIN URINE: NEGATIVE
BLOOD URINE: NEGATIVE
COLOR: NORMAL
GLUCOSE QUALITATIVE U: NEGATIVE
HYALINE CASTS: 0 /LPF
KETONES URINE: NEGATIVE
LEUKOCYTE ESTERASE URINE: ABNORMAL
MICROSCOPIC-UA: NORMAL
NITRITE URINE: NEGATIVE
PH URINE: 6
PROTEIN URINE: NORMAL
RED BLOOD CELLS URINE: 2 /HPF
SPECIFIC GRAVITY URINE: 1.02
SQUAMOUS EPITHELIAL CELLS: 1 /HPF
URINE CYTOLOGY: NORMAL
UROBILINOGEN URINE: NORMAL
WHITE BLOOD CELLS URINE: 10 /HPF

## 2023-02-10 NOTE — END OF VISIT
[FreeTextEntry3] : I explained this to the patient and he seemed relieved with this knowing what is causing it. Urine is sent today and he can call back with any symptoms.

## 2023-02-10 NOTE — ASSESSMENT
[FreeTextEntry1] : I feel that his prior radiation is the reason for these symptoms especially with his negative urine testing.

## 2023-02-10 NOTE — HISTORY OF PRESENT ILLNESS
[FreeTextEntry1] : 89M presents today with a CC of a 2 year history of dysuria with a negative urine culture. Relative to this is that he had radiation treatment for prostate cancer approximately 10 years ago. The onset of his symptoms is gradual. His PVR was 50cc and is emptying his bladder well.

## 2023-02-10 NOTE — REVIEW OF SYSTEMS
[Wake up at night to urinate  How many times?  ___] : wakes up to urinate [unfilled] times during the night [see HPI] : see HPI [Negative] : Cardiovascular [FreeTextEntry2] : high blood pressure-medicated

## 2023-02-11 LAB — BACTERIA UR CULT: NORMAL

## 2023-02-13 ENCOUNTER — NON-APPOINTMENT (OUTPATIENT)
Age: 88
End: 2023-02-13

## 2023-02-13 DIAGNOSIS — R89.6 ABNORMAL CYTOLOGICAL FINDINGS IN SPECIMENS FROM OTHER ORGANS, SYSTEMS AND TISSUES: ICD-10-CM

## 2023-02-27 ENCOUNTER — RX RENEWAL (OUTPATIENT)
Age: 88
End: 2023-02-27

## 2023-03-23 ENCOUNTER — RESULT REVIEW (OUTPATIENT)
Age: 88
End: 2023-03-23

## 2023-03-24 ENCOUNTER — OUTPATIENT (OUTPATIENT)
Dept: OUTPATIENT SERVICES | Facility: HOSPITAL | Age: 88
LOS: 1 days | End: 2023-03-24
Payer: MEDICARE

## 2023-03-24 ENCOUNTER — APPOINTMENT (OUTPATIENT)
Dept: CT IMAGING | Facility: CLINIC | Age: 88
End: 2023-03-24
Payer: MEDICARE

## 2023-03-24 DIAGNOSIS — R89.6 ABNORMAL CYTOLOGICAL FINDINGS IN SPECIMENS FROM OTHER ORGANS, SYSTEMS AND TISSUES: ICD-10-CM

## 2023-03-24 DIAGNOSIS — Z98.89 OTHER SPECIFIED POSTPROCEDURAL STATES: Chronic | ICD-10-CM

## 2023-03-24 PROCEDURE — 74178 CT ABD&PLV WO CNTR FLWD CNTR: CPT | Mod: 26

## 2023-03-24 PROCEDURE — 74178 CT ABD&PLV WO CNTR FLWD CNTR: CPT

## 2023-03-30 DIAGNOSIS — R30.0 DYSURIA: ICD-10-CM

## 2023-03-30 RX ORDER — PHENAZOPYRIDINE 200 MG/1
200 TABLET, FILM COATED ORAL 3 TIMES DAILY
Qty: 21 | Refills: 0 | Status: ACTIVE | COMMUNITY
Start: 2023-03-30 | End: 1900-01-01

## 2023-03-30 RX ORDER — NITROFURANTOIN (MONOHYDRATE/MACROCRYSTALS) 25; 75 MG/1; MG/1
100 CAPSULE ORAL TWICE DAILY
Qty: 14 | Refills: 0 | Status: ACTIVE | COMMUNITY
Start: 2023-03-30 | End: 1900-01-01

## 2023-03-31 LAB
APPEARANCE: CLEAR
BACTERIA: ABNORMAL
BILIRUBIN URINE: NEGATIVE
BLOOD URINE: NEGATIVE
CALCIUM OXALATE CRYSTALS: ABNORMAL
COLOR: YELLOW
GLUCOSE QUALITATIVE U: NEGATIVE
HYALINE CASTS: 0 /LPF
KETONES URINE: NEGATIVE
LEUKOCYTE ESTERASE URINE: ABNORMAL
MICROSCOPIC-UA: NORMAL
NITRITE URINE: NEGATIVE
PH URINE: 6
PROTEIN URINE: ABNORMAL
RED BLOOD CELLS URINE: 2 /HPF
SPECIFIC GRAVITY URINE: 1.03
SQUAMOUS EPITHELIAL CELLS: 2 /HPF
UROBILINOGEN URINE: NORMAL
WHITE BLOOD CELLS URINE: 278 /HPF

## 2023-04-03 LAB — BACTERIA UR CULT: NORMAL

## 2023-04-04 ENCOUNTER — NON-APPOINTMENT (OUTPATIENT)
Age: 88
End: 2023-04-04

## 2023-06-01 NOTE — ED STATDOCS - OBJECTIVE STATEMENT
"See "acute on chronic respiratory failure"    " 84 y/o M with a hx of BPH, HTN, cardiac stents on Plavix presents to ED s/p slip and fall on ice yesterday onto left hand. Pt is left handed. Denies other injury.

## 2023-06-26 NOTE — ED STATDOCS - CARDIAC, MLM
ISADORA GREGORY    [phone]    PT IS NEEDING A RETURN TO WORK NOTE. HAD BABY 3/31/23.    WANTING TO RETURN TO WORK TODAY, THIS AFTERNOON BY 4pm    PLEASE PLACE NOTE ON MYCHART.  
normal rate, regular rhythm, and no murmur.

## 2023-08-11 ENCOUNTER — INPATIENT (INPATIENT)
Facility: HOSPITAL | Age: 88
LOS: 0 days | Discharge: LEFT BEFORE DISCHARGE | DRG: 312 | End: 2023-08-12
Attending: HOSPITALIST | Admitting: FAMILY MEDICINE
Payer: MEDICARE

## 2023-08-11 VITALS
HEART RATE: 58 BPM | DIASTOLIC BLOOD PRESSURE: 67 MMHG | TEMPERATURE: 98 F | RESPIRATION RATE: 18 BRPM | WEIGHT: 139.99 LBS | OXYGEN SATURATION: 94 % | SYSTOLIC BLOOD PRESSURE: 178 MMHG | HEIGHT: 67 IN

## 2023-08-11 DIAGNOSIS — R55 SYNCOPE AND COLLAPSE: ICD-10-CM

## 2023-08-11 DIAGNOSIS — Z98.89 OTHER SPECIFIED POSTPROCEDURAL STATES: Chronic | ICD-10-CM

## 2023-08-11 LAB
ALBUMIN SERPL ELPH-MCNC: 3.2 G/DL — LOW (ref 3.3–5)
ALP SERPL-CCNC: 100 U/L — SIGNIFICANT CHANGE UP (ref 40–120)
ALT FLD-CCNC: 21 U/L — SIGNIFICANT CHANGE UP (ref 12–78)
ANION GAP SERPL CALC-SCNC: 4 MMOL/L — LOW (ref 5–17)
APPEARANCE UR: CLEAR — SIGNIFICANT CHANGE UP
APTT BLD: 26.6 SEC — SIGNIFICANT CHANGE UP (ref 24.5–35.6)
AST SERPL-CCNC: 26 U/L — SIGNIFICANT CHANGE UP (ref 15–37)
BACTERIA # UR AUTO: ABNORMAL
BASOPHILS # BLD AUTO: 0.02 K/UL — SIGNIFICANT CHANGE UP (ref 0–0.2)
BASOPHILS NFR BLD AUTO: 0.3 % — SIGNIFICANT CHANGE UP (ref 0–2)
BILIRUB SERPL-MCNC: 0.3 MG/DL — SIGNIFICANT CHANGE UP (ref 0.2–1.2)
BILIRUB UR-MCNC: NEGATIVE — SIGNIFICANT CHANGE UP
BUN SERPL-MCNC: 11 MG/DL — SIGNIFICANT CHANGE UP (ref 7–23)
CALCIUM SERPL-MCNC: 8.8 MG/DL — SIGNIFICANT CHANGE UP (ref 8.5–10.1)
CHLORIDE SERPL-SCNC: 106 MMOL/L — SIGNIFICANT CHANGE UP (ref 96–108)
CO2 SERPL-SCNC: 27 MMOL/L — SIGNIFICANT CHANGE UP (ref 22–31)
COLOR SPEC: YELLOW — SIGNIFICANT CHANGE UP
CREAT SERPL-MCNC: 0.9 MG/DL — SIGNIFICANT CHANGE UP (ref 0.5–1.3)
DIFF PNL FLD: ABNORMAL
EGFR: 81 ML/MIN/1.73M2 — SIGNIFICANT CHANGE UP
EOSINOPHIL # BLD AUTO: 0.07 K/UL — SIGNIFICANT CHANGE UP (ref 0–0.5)
EOSINOPHIL NFR BLD AUTO: 1 % — SIGNIFICANT CHANGE UP (ref 0–6)
EPI CELLS # UR: SIGNIFICANT CHANGE UP
GLUCOSE SERPL-MCNC: 145 MG/DL — HIGH (ref 70–99)
GLUCOSE UR QL: NEGATIVE — SIGNIFICANT CHANGE UP
HCT VFR BLD CALC: 36.2 % — LOW (ref 39–50)
HGB BLD-MCNC: 12 G/DL — LOW (ref 13–17)
IMM GRANULOCYTES NFR BLD AUTO: 0.4 % — SIGNIFICANT CHANGE UP (ref 0–0.9)
INR BLD: 0.95 RATIO — SIGNIFICANT CHANGE UP (ref 0.85–1.18)
KETONES UR-MCNC: ABNORMAL
LEUKOCYTE ESTERASE UR-ACNC: ABNORMAL
LYMPHOCYTES # BLD AUTO: 1.55 K/UL — SIGNIFICANT CHANGE UP (ref 1–3.3)
LYMPHOCYTES # BLD AUTO: 22.7 % — SIGNIFICANT CHANGE UP (ref 13–44)
MCHC RBC-ENTMCNC: 29.1 PG — SIGNIFICANT CHANGE UP (ref 27–34)
MCHC RBC-ENTMCNC: 33.1 GM/DL — SIGNIFICANT CHANGE UP (ref 32–36)
MCV RBC AUTO: 87.9 FL — SIGNIFICANT CHANGE UP (ref 80–100)
MONOCYTES # BLD AUTO: 0.5 K/UL — SIGNIFICANT CHANGE UP (ref 0–0.9)
MONOCYTES NFR BLD AUTO: 7.3 % — SIGNIFICANT CHANGE UP (ref 2–14)
NEUTROPHILS # BLD AUTO: 4.67 K/UL — SIGNIFICANT CHANGE UP (ref 1.8–7.4)
NEUTROPHILS NFR BLD AUTO: 68.3 % — SIGNIFICANT CHANGE UP (ref 43–77)
NITRITE UR-MCNC: NEGATIVE — SIGNIFICANT CHANGE UP
PH UR: 6.5 — SIGNIFICANT CHANGE UP (ref 5–8)
PLATELET # BLD AUTO: 226 K/UL — SIGNIFICANT CHANGE UP (ref 150–400)
POTASSIUM SERPL-MCNC: 4.3 MMOL/L — SIGNIFICANT CHANGE UP (ref 3.5–5.3)
POTASSIUM SERPL-SCNC: 4.3 MMOL/L — SIGNIFICANT CHANGE UP (ref 3.5–5.3)
PROT SERPL-MCNC: 7 GM/DL — SIGNIFICANT CHANGE UP (ref 6–8.3)
PROT UR-MCNC: 30 MG/DL
PROTHROM AB SERPL-ACNC: 10.8 SEC — SIGNIFICANT CHANGE UP (ref 9.5–13)
RBC # BLD: 4.12 M/UL — LOW (ref 4.2–5.8)
RBC # FLD: 12.4 % — SIGNIFICANT CHANGE UP (ref 10.3–14.5)
RBC CASTS # UR COMP ASSIST: ABNORMAL /HPF (ref 0–4)
SODIUM SERPL-SCNC: 137 MMOL/L — SIGNIFICANT CHANGE UP (ref 135–145)
SP GR SPEC: 1.01 — SIGNIFICANT CHANGE UP (ref 1.01–1.02)
TROPONIN I, HIGH SENSITIVITY RESULT: 8.28 NG/L — SIGNIFICANT CHANGE UP
UROBILINOGEN FLD QL: NEGATIVE — SIGNIFICANT CHANGE UP
WBC # BLD: 6.84 K/UL — SIGNIFICANT CHANGE UP (ref 3.8–10.5)
WBC # FLD AUTO: 6.84 K/UL — SIGNIFICANT CHANGE UP (ref 3.8–10.5)
WBC UR QL: ABNORMAL /HPF (ref 0–5)

## 2023-08-11 PROCEDURE — 84484 ASSAY OF TROPONIN QUANT: CPT

## 2023-08-11 PROCEDURE — 70496 CT ANGIOGRAPHY HEAD: CPT | Mod: 26,MA

## 2023-08-11 PROCEDURE — 82962 GLUCOSE BLOOD TEST: CPT

## 2023-08-11 PROCEDURE — 93010 ELECTROCARDIOGRAM REPORT: CPT

## 2023-08-11 PROCEDURE — 80053 COMPREHEN METABOLIC PANEL: CPT

## 2023-08-11 PROCEDURE — 93306 TTE W/DOPPLER COMPLETE: CPT

## 2023-08-11 PROCEDURE — 81001 URINALYSIS AUTO W/SCOPE: CPT

## 2023-08-11 PROCEDURE — 36415 COLL VENOUS BLD VENIPUNCTURE: CPT

## 2023-08-11 PROCEDURE — 70498 CT ANGIOGRAPHY NECK: CPT | Mod: 26,MA

## 2023-08-11 PROCEDURE — 87086 URINE CULTURE/COLONY COUNT: CPT

## 2023-08-11 PROCEDURE — 85025 COMPLETE CBC W/AUTO DIFF WBC: CPT

## 2023-08-11 PROCEDURE — 99285 EMERGENCY DEPT VISIT HI MDM: CPT

## 2023-08-11 PROCEDURE — 87040 BLOOD CULTURE FOR BACTERIA: CPT

## 2023-08-11 PROCEDURE — 71045 X-RAY EXAM CHEST 1 VIEW: CPT | Mod: 26

## 2023-08-11 PROCEDURE — 83036 HEMOGLOBIN GLYCOSYLATED A1C: CPT

## 2023-08-11 PROCEDURE — 74177 CT ABD & PELVIS W/CONTRAST: CPT | Mod: 26,MA

## 2023-08-11 RX ORDER — SODIUM CHLORIDE 9 MG/ML
1000 INJECTION INTRAMUSCULAR; INTRAVENOUS; SUBCUTANEOUS ONCE
Refills: 0 | Status: COMPLETED | OUTPATIENT
Start: 2023-08-11 | End: 2023-08-11

## 2023-08-11 RX ORDER — FAMOTIDINE 10 MG/ML
20 INJECTION INTRAVENOUS ONCE
Refills: 0 | Status: COMPLETED | OUTPATIENT
Start: 2023-08-11 | End: 2023-08-11

## 2023-08-11 RX ADMIN — FAMOTIDINE 20 MILLIGRAM(S): 10 INJECTION INTRAVENOUS at 17:10

## 2023-08-11 RX ADMIN — SODIUM CHLORIDE 1000 MILLILITER(S): 9 INJECTION INTRAMUSCULAR; INTRAVENOUS; SUBCUTANEOUS at 15:07

## 2023-08-11 NOTE — ED ADULT TRIAGE NOTE - CHIEF COMPLAINT QUOTE
Pt. is A&OX3, BIBEMS from MD's office s/p syncopal episode. EMS reports pt went to doctor with c/o dizziness for 2 days. Went to sit down after being weighed when he had a syncopal episode. No injuries sustained.   2 episodes of vomiting. pt. reports going to the doctor because of losing his balance since yesterday. Endorses nausea. Denies CP, SOB, fevers.  MD Palmer aware, no code stroke called. Pt. is A&OX3, BIBEMS from MD's office s/p syncopal episode. EMS reports pt went to doctor with c/o dizziness for 2 days. Went to sit down after being weighed when he had a syncopal episode. No injuries sustained.   2 episodes of vomiting. pt. reports going to the doctor because of losing his balance since yesterday. Endorses nausea. Denies CP, SOB, fevers.  MD Palmer aware, no code stroke called. Taken for EKG and cardiac monitor.

## 2023-08-11 NOTE — ED ADULT NURSE NOTE - OBJECTIVE STATEMENT
89 y/o male ARISTEO MARES from Dr. Morgan office for syncopal episode on the table. pt has been experiencing intermittent dizziness for 2 days hence his visit to  office. as per EMS, pt went to sit down after being weighed and fainted. + nausea and x2 episode of emesis today. EKG obtained indicating NSR with 1st degree AVB. IVL inserted, blood work drawn and sent.

## 2023-08-11 NOTE — ED ADULT NURSE NOTE - NSFALLHARMRISKINTERV_ED_ALL_ED

## 2023-08-11 NOTE — ED PROVIDER NOTE - OBJECTIVE STATEMENT
89 y/o male with a PMHx of BPH, CAD s/p stents, GERD, HTN, HLD, NSVT, palpitations, prostate cancer presents to the ED s/p syncope. Pt has been dizzy the last 2 days. Pt has felt off balance since yesterday around 1pm. Pt was seen in MD office today and had a syncopal episode. +vomiting x2 episodes. No other complaints at this time.

## 2023-08-11 NOTE — PHARMACOTHERAPY INTERVENTION NOTE - COMMENTS
Medication history complete. Medications and allergies reviewed with patient's daughter, Jill at bedside and confirmed with .

## 2023-08-11 NOTE — PATIENT PROFILE ADULT - FLU SEASON?
CAPRI Pierre MD [facility] [place]   11/29/2022 10:10 AM Judd Rivas OD [facility] [place]       No

## 2023-08-11 NOTE — ED PROVIDER NOTE - NEUROLOGICAL, MLM
Alert and oriented, no focal deficits, no motor or sensory deficits. No facial droop. No dysmetria. Normal sensation. Normal speech

## 2023-08-11 NOTE — ED ADULT NURSE NOTE - CHIEF COMPLAINT QUOTE
Pt. is A&OX3, BIBEMS from MD's office s/p syncopal episode. EMS reports pt went to doctor with c/o dizziness for 2 days. Went to sit down after being weighed when he had a syncopal episode. No injuries sustained.   2 episodes of vomiting. pt. reports going to the doctor because of losing his balance since yesterday. Endorses nausea. Denies CP, SOB, fevers.  MD Palmer aware, no code stroke called. Taken for EKG and cardiac monitor.

## 2023-08-11 NOTE — ED PROVIDER NOTE - CARE PLAN
1 Principal Discharge DX:	Syncope  Secondary Diagnosis:	Carotid stenosis  Secondary Diagnosis:	Gastritis

## 2023-08-11 NOTE — PATIENT PROFILE ADULT - FALL HARM RISK - HARM RISK INTERVENTIONS
Assistance with ambulation/Assistance OOB with selected safe patient handling equipment/Communicate Risk of Fall with Harm to all staff/Discuss with provider need for PT consult/Monitor gait and stability/Reinforce activity limits and safety measures with patient and family/Sit up slowly, dangle for a short time, stand at bedside before walking/Tailored Fall Risk Interventions/Use of alarms - bed, chair and/or voice tab/Visual Cue: Yellow wristband and red socks/Bed in lowest position, wheels locked, appropriate side rails in place/Call bell, personal items and telephone in reach/Instruct patient to call for assistance before getting out of bed or chair/Non-slip footwear when patient is out of bed/[place] to call system/Physically safe environment - no spills, clutter or unnecessary equipment/Purposeful Proactive Rounding/Room/bathroom lighting operational, light cord in reach

## 2023-08-11 NOTE — ED PROVIDER NOTE - CLINICAL SUMMARY MEDICAL DECISION MAKING FREE TEXT BOX
Labs WNL.  CTs with bilateral carotid stenosis; gastritis.  EKG NSR, 1st degree block.  Troponin WNL.  Well on reexam.  Given pepcid, IVF.  Admit to medicine tele service given syncope. Discussed with Dr. Feldman.

## 2023-08-11 NOTE — ED ADULT NURSE REASSESSMENT NOTE - NS ED NURSE REASSESS COMMENT FT1
Pt is AAOX3, VS stable, pt verbalized understanding of not getting up by himself. pt in no acute distress at this time. pt remains on cardiac monitor.

## 2023-08-11 NOTE — PATIENT PROFILE ADULT - FUNCTIONAL SCREEN CURRENT LEVEL: COMMUNICATION, MLM
0 = understands/communicates without difficulty

## 2023-08-12 VITALS
DIASTOLIC BLOOD PRESSURE: 51 MMHG | TEMPERATURE: 98 F | RESPIRATION RATE: 18 BRPM | SYSTOLIC BLOOD PRESSURE: 125 MMHG | OXYGEN SATURATION: 96 % | HEART RATE: 54 BPM

## 2023-08-12 LAB
A1C WITH ESTIMATED AVERAGE GLUCOSE RESULT: 6.7 % — HIGH (ref 4–5.6)
ALBUMIN SERPL ELPH-MCNC: 3.3 G/DL — SIGNIFICANT CHANGE UP (ref 3.3–5)
ALP SERPL-CCNC: 96 U/L — SIGNIFICANT CHANGE UP (ref 40–120)
ALT FLD-CCNC: 21 U/L — SIGNIFICANT CHANGE UP (ref 12–78)
ANION GAP SERPL CALC-SCNC: 5 MMOL/L — SIGNIFICANT CHANGE UP (ref 5–17)
AST SERPL-CCNC: 20 U/L — SIGNIFICANT CHANGE UP (ref 15–37)
BASOPHILS # BLD AUTO: 0.02 K/UL — SIGNIFICANT CHANGE UP (ref 0–0.2)
BASOPHILS NFR BLD AUTO: 0.2 % — SIGNIFICANT CHANGE UP (ref 0–2)
BILIRUB SERPL-MCNC: 0.3 MG/DL — SIGNIFICANT CHANGE UP (ref 0.2–1.2)
BUN SERPL-MCNC: 10 MG/DL — SIGNIFICANT CHANGE UP (ref 7–23)
CALCIUM SERPL-MCNC: 9 MG/DL — SIGNIFICANT CHANGE UP (ref 8.5–10.1)
CHLORIDE SERPL-SCNC: 107 MMOL/L — SIGNIFICANT CHANGE UP (ref 96–108)
CO2 SERPL-SCNC: 29 MMOL/L — SIGNIFICANT CHANGE UP (ref 22–31)
CREAT SERPL-MCNC: 0.87 MG/DL — SIGNIFICANT CHANGE UP (ref 0.5–1.3)
EGFR: 81 ML/MIN/1.73M2 — SIGNIFICANT CHANGE UP
EOSINOPHIL # BLD AUTO: 0.05 K/UL — SIGNIFICANT CHANGE UP (ref 0–0.5)
EOSINOPHIL NFR BLD AUTO: 0.5 % — SIGNIFICANT CHANGE UP (ref 0–6)
ESTIMATED AVERAGE GLUCOSE: 146 MG/DL — HIGH (ref 68–114)
GLUCOSE BLDC GLUCOMTR-MCNC: 116 MG/DL — HIGH (ref 70–99)
GLUCOSE SERPL-MCNC: 103 MG/DL — HIGH (ref 70–99)
HCT VFR BLD CALC: 35.8 % — LOW (ref 39–50)
HGB BLD-MCNC: 11.8 G/DL — LOW (ref 13–17)
IMM GRANULOCYTES NFR BLD AUTO: 0.3 % — SIGNIFICANT CHANGE UP (ref 0–0.9)
LYMPHOCYTES # BLD AUTO: 1.61 K/UL — SIGNIFICANT CHANGE UP (ref 1–3.3)
LYMPHOCYTES # BLD AUTO: 16 % — SIGNIFICANT CHANGE UP (ref 13–44)
MCHC RBC-ENTMCNC: 29.1 PG — SIGNIFICANT CHANGE UP (ref 27–34)
MCHC RBC-ENTMCNC: 33 GM/DL — SIGNIFICANT CHANGE UP (ref 32–36)
MCV RBC AUTO: 88.2 FL — SIGNIFICANT CHANGE UP (ref 80–100)
MONOCYTES # BLD AUTO: 0.76 K/UL — SIGNIFICANT CHANGE UP (ref 0–0.9)
MONOCYTES NFR BLD AUTO: 7.5 % — SIGNIFICANT CHANGE UP (ref 2–14)
NEUTROPHILS # BLD AUTO: 7.6 K/UL — HIGH (ref 1.8–7.4)
NEUTROPHILS NFR BLD AUTO: 75.5 % — SIGNIFICANT CHANGE UP (ref 43–77)
PLATELET # BLD AUTO: 241 K/UL — SIGNIFICANT CHANGE UP (ref 150–400)
POTASSIUM SERPL-MCNC: 3.3 MMOL/L — LOW (ref 3.5–5.3)
POTASSIUM SERPL-SCNC: 3.3 MMOL/L — LOW (ref 3.5–5.3)
PROT SERPL-MCNC: 7.3 GM/DL — SIGNIFICANT CHANGE UP (ref 6–8.3)
RBC # BLD: 4.06 M/UL — LOW (ref 4.2–5.8)
RBC # FLD: 12.7 % — SIGNIFICANT CHANGE UP (ref 10.3–14.5)
SODIUM SERPL-SCNC: 141 MMOL/L — SIGNIFICANT CHANGE UP (ref 135–145)
TROPONIN I, HIGH SENSITIVITY RESULT: 45.36 NG/L — SIGNIFICANT CHANGE UP
WBC # BLD: 10.07 K/UL — SIGNIFICANT CHANGE UP (ref 3.8–10.5)
WBC # FLD AUTO: 10.07 K/UL — SIGNIFICANT CHANGE UP (ref 3.8–10.5)

## 2023-08-12 PROCEDURE — 99223 1ST HOSP IP/OBS HIGH 75: CPT

## 2023-08-12 PROCEDURE — 12345: CPT | Mod: NC

## 2023-08-12 PROCEDURE — 93306 TTE W/DOPPLER COMPLETE: CPT | Mod: 26

## 2023-08-12 PROCEDURE — 99232 SBSQ HOSP IP/OBS MODERATE 35: CPT | Mod: GC

## 2023-08-12 RX ORDER — INSULIN LISPRO 100/ML
VIAL (ML) SUBCUTANEOUS
Refills: 0 | Status: DISCONTINUED | OUTPATIENT
Start: 2023-08-12 | End: 2023-08-12

## 2023-08-12 RX ORDER — LANOLIN ALCOHOL/MO/W.PET/CERES
3 CREAM (GRAM) TOPICAL AT BEDTIME
Refills: 0 | Status: DISCONTINUED | OUTPATIENT
Start: 2023-08-12 | End: 2023-08-12

## 2023-08-12 RX ORDER — ACETAMINOPHEN 500 MG
650 TABLET ORAL EVERY 6 HOURS
Refills: 0 | Status: DISCONTINUED | OUTPATIENT
Start: 2023-08-12 | End: 2023-08-12

## 2023-08-12 RX ORDER — GLUCAGON INJECTION, SOLUTION 0.5 MG/.1ML
1 INJECTION, SOLUTION SUBCUTANEOUS ONCE
Refills: 0 | Status: DISCONTINUED | OUTPATIENT
Start: 2023-08-12 | End: 2023-08-12

## 2023-08-12 RX ORDER — SODIUM CHLORIDE 9 MG/ML
1000 INJECTION, SOLUTION INTRAVENOUS
Refills: 0 | Status: DISCONTINUED | OUTPATIENT
Start: 2023-08-12 | End: 2023-08-12

## 2023-08-12 RX ORDER — DEXTROSE 50 % IN WATER 50 %
12.5 SYRINGE (ML) INTRAVENOUS ONCE
Refills: 0 | Status: DISCONTINUED | OUTPATIENT
Start: 2023-08-12 | End: 2023-08-12

## 2023-08-12 RX ORDER — DEXTROSE 50 % IN WATER 50 %
15 SYRINGE (ML) INTRAVENOUS ONCE
Refills: 0 | Status: DISCONTINUED | OUTPATIENT
Start: 2023-08-12 | End: 2023-08-12

## 2023-08-12 RX ORDER — DEXTROSE 50 % IN WATER 50 %
25 SYRINGE (ML) INTRAVENOUS ONCE
Refills: 0 | Status: DISCONTINUED | OUTPATIENT
Start: 2023-08-12 | End: 2023-08-12

## 2023-08-12 RX ORDER — ENOXAPARIN SODIUM 100 MG/ML
40 INJECTION SUBCUTANEOUS EVERY 24 HOURS
Refills: 0 | Status: DISCONTINUED | OUTPATIENT
Start: 2023-08-12 | End: 2023-08-12

## 2023-08-12 RX ORDER — CEFTRIAXONE 500 MG/1
1000 INJECTION, POWDER, FOR SOLUTION INTRAMUSCULAR; INTRAVENOUS EVERY 24 HOURS
Refills: 0 | Status: DISCONTINUED | OUTPATIENT
Start: 2023-08-12 | End: 2023-08-12

## 2023-08-12 RX ORDER — CLOPIDOGREL BISULFATE 75 MG/1
75 TABLET, FILM COATED ORAL DAILY
Refills: 0 | Status: DISCONTINUED | OUTPATIENT
Start: 2023-08-12 | End: 2023-08-12

## 2023-08-12 RX ORDER — ONDANSETRON 8 MG/1
4 TABLET, FILM COATED ORAL EVERY 8 HOURS
Refills: 0 | Status: DISCONTINUED | OUTPATIENT
Start: 2023-08-12 | End: 2023-08-12

## 2023-08-12 RX ORDER — INSULIN LISPRO 100/ML
VIAL (ML) SUBCUTANEOUS AT BEDTIME
Refills: 0 | Status: DISCONTINUED | OUTPATIENT
Start: 2023-08-12 | End: 2023-08-12

## 2023-08-12 RX ORDER — PREGABALIN 225 MG/1
1000 CAPSULE ORAL DAILY
Refills: 0 | Status: DISCONTINUED | OUTPATIENT
Start: 2023-08-12 | End: 2023-08-12

## 2023-08-12 RX ORDER — PANTOPRAZOLE SODIUM 20 MG/1
40 TABLET, DELAYED RELEASE ORAL
Refills: 0 | Status: DISCONTINUED | OUTPATIENT
Start: 2023-08-12 | End: 2023-08-12

## 2023-08-12 RX ORDER — TAMSULOSIN HYDROCHLORIDE 0.4 MG/1
0.4 CAPSULE ORAL AT BEDTIME
Refills: 0 | Status: DISCONTINUED | OUTPATIENT
Start: 2023-08-12 | End: 2023-08-12

## 2023-08-12 RX ORDER — METOPROLOL TARTRATE 50 MG
25 TABLET ORAL EVERY 12 HOURS
Refills: 0 | Status: DISCONTINUED | OUTPATIENT
Start: 2023-08-12 | End: 2023-08-12

## 2023-08-12 RX ADMIN — CLOPIDOGREL BISULFATE 75 MILLIGRAM(S): 75 TABLET, FILM COATED ORAL at 09:29

## 2023-08-12 RX ADMIN — Medication 25 MILLIGRAM(S): at 09:29

## 2023-08-12 RX ADMIN — CEFTRIAXONE 1000 MILLIGRAM(S): 500 INJECTION, POWDER, FOR SOLUTION INTRAMUSCULAR; INTRAVENOUS at 05:40

## 2023-08-12 RX ADMIN — PREGABALIN 1000 MICROGRAM(S): 225 CAPSULE ORAL at 09:29

## 2023-08-12 RX ADMIN — ENOXAPARIN SODIUM 40 MILLIGRAM(S): 100 INJECTION SUBCUTANEOUS at 05:45

## 2023-08-12 NOTE — H&P ADULT - HISTORY OF PRESENT ILLNESS
89 year old male PMHx significant for CAD s/p PCI w/ stent placement, Hx NSVT, Prostate Cancer, BPH, Diabetes Mellitus Type 2, GERD, Hypertension, Hyperlipidemia was BIBA from PCP's office for futher evaluation and management s/p witnesed syncopal episode. Of note the patient was being evaluated fou ongoing symptoms of intractable dizziness over the past 2 days, with progression to gait disturbance, and 2 episodes of vomiting (non-bloody). Of note there was no associated head trauma, prodrome of shortness of breath, blurry vision, or chest pain. Vital Signs in Triage => /67, HR 58/min, RR 18/min, TMax 97.5'F, SpO2 94% on room air. Labs => UA (+). CXR => Slight linear atelectasis left base at this time. CT ABD/Pelvis w/ IV Contrast => Fluid distended stomach with mild concentric wall thickening of the gastric pylorus and submucosal fat deposition throughout the gastric rugal folds. Correlate clinically for chronic gastritis and peptic ulcer disease. CTHead/CTA Head/Neck => 65% stenosis at the origin of the left internal carotid artery with a 40% stenosis at the origin of the right internal carotid artery by NASCET criteria. No intracranial vascular compromise. In the ED the patient was given Famotidine 20mg IVP x 1, and NS x 1L.   89 year old male PMHx significant for CAD s/p PCI w/ stent placement, Hx NSVT, Prostate Cancer, BPH, Diabetes Mellitus Type 2, GERD, Hypertension, Hyperlipidemia was BIBA from PCP's office for further evaluation and management s/p witnessed syncopal episode. Of note the patient was being evaluated for ongoing symptoms of intractable dizziness over the past 2 days, with progression to gait disturbance, and 2 episodes of vomiting (non-bloody). Of note there was no associated head trauma, prodrome of shortness of breath, blurry vision, or chest pain. Vital Signs in Triage => /67, HR 58/min, RR 18/min, TMax 97.5'F, SpO2 94% on room air. Labs => UA (+). CXR => Slight linear atelectasis left base at this time. CT ABD/Pelvis w/ IV Contrast => Fluid distended stomach with mild concentric wall thickening of the gastric pylorus and submucosal fat deposition throughout the gastric rugal folds. Correlate clinically for chronic gastritis and peptic ulcer disease. CTHead/CTA Head/Neck => 65% stenosis at the origin of the left internal carotid artery with a 40% stenosis at the origin of the right internal carotid artery by NASCET criteria. No intracranial vascular compromise. In the ED the patient was given Famotidine 20mg IVP x 1, and NS x 1L.

## 2023-08-12 NOTE — H&P ADULT - ASSESSMENT
89 year old male PMHx significant for CAD s/p PCI w/ stent placement, Hx NSVT, Prostate Cancer, BPH, Diabetes Mellitus Type 2, GERD, Hypertension, Hyperlipidemia was BIBA from PCP's office for futher evaluation and management s/p witnesed syncopal episode. Of note the patient was being evaluated fou ongoing symptoms of intractable dizziness over the past 2 days, with progression to gait disturbance, and 2 episodes of vomiting (non-bloody). Of note there was no associated head trauma, prodrome of shortness of breath, blurry vision, or chest pain. Vital Signs in Triage => /67, HR 58/min, RR 18/min, TMax 97.5'F, SpO2 94% on room air. Labs => UA (+). CXR => Slight linear atelectasis left base at this time. CT ABD/Pelvis w/ IV Contrast => Fluid distended stomach with mild concentric wall thickening of the gastric pylorus and submucosal fat deposition throughout the gastric rugal folds. Correlate clinically for chronic gastritis and peptic ulcer disease. CTHead/CTA Head/Neck => 65% stenosis at the origin of the left internal carotid artery with a 40% stenosis at the origin of the right internal carotid artery by NASCET criteria. No intracranial vascular compromise. In the ED the patient was given Famotidine 20mg IVP x 1, and NS x 1L. 89 year old male PMHx significant for CAD s/p PCI w/ stent placement, Hx NSVT, Prostate Cancer, BPH, Diabetes Mellitus Type 2, GERD, Hypertension, Hyperlipidemia was BIBA from PCP's office for further evaluation and management s/p witnessed syncopal episode. Of note the patient was being evaluated for ongoing symptoms of intractable dizziness over the past 2 days, with progression to gait disturbance, and 2 episodes of vomiting (non-bloody). Of note there was no associated head trauma, prodrome of shortness of breath, blurry vision, or chest pain. Vital Signs in Triage => /67, HR 58/min, RR 18/min, TMax 97.5'F, SpO2 94% on room air. Labs => UA (+). CXR => Slight linear atelectasis left base at this time. CT ABD/Pelvis w/ IV Contrast => Fluid distended stomach with mild concentric wall thickening of the gastric pylorus and submucosal fat deposition throughout the gastric rugal folds. Correlate clinically for chronic gastritis and peptic ulcer disease. CTHead/CTA Head/Neck => 65% stenosis at the origin of the left internal carotid artery with a 40% stenosis at the origin of the right internal carotid artery by NASCET criteria. No intracranial vascular compromise. In the ED the patient was given Famotidine 20mg IVP x 1, and NS x 1L.    #Syncope  ~admit to Telemetry  ~serial Kathy/EKGs  ~fall precautions  ~bed alarm  ~f/u w/ Cardiology in the am    #Carotid Stenosis  ~CT imaging revealed 65% stenosis at the origin of the left internal carotid artery with a 40% stenosis at the origin of the right internal carotid artery by NASCET criteria  ~f/u w/ Vascular Surgery consultation in the am    #CAD/Hx PCI  ~cont. Clopidogrel 75mg po daily    #Hypertension  ~cont. Metoprolol ER 25mg po q12h    #Hyperlipidemia  ~cont. Pravastatin 40mg po qhs    #Diabetes Mellitus Type 2  ~FS qAC/HS  ~cont. ISS per protocol  ~cont. low carb diet    #GERD/Gastritis  ~cont. Omeprazole 20mg po daily    #BPH  ~cont. Tamsulosin     #Vte ppx  ~cont. LMWH sq

## 2023-08-12 NOTE — CONSULT NOTE ADULT - SUBJECTIVE AND OBJECTIVE BOX
CHIEF COMPLAINT: Patient is a 89y old  Male who presents with a chief complaint of Syncope, Dizziness (12 Aug 2023 10:03)      HPI: HPI:  89 year old male PMHx significant for CAD s/p PCI w/ stent placement, Hx NSVT, Prostate Cancer, BPH, Diabetes Mellitus Type 2, GERD, Hypertension, Hyperlipidemia was BIBA from PCP's office for further evaluation and management s/p witnessed syncopal episode. Of note the patient was being evaluated for ongoing symptoms of intractable dizziness over the past 2 days, with progression to gait disturbance, and 2 episodes of vomiting (non-bloody). Of note there was no associated head trauma, prodrome of shortness of breath, blurry vision, or chest pain. Vital Signs in Triage => /67, HR 58/min, RR 18/min, TMax 97.5'F, SpO2 94% on room air. Labs => UA (+). CXR => Slight linear atelectasis left base at this time. CT ABD/Pelvis w/ IV Contrast => Fluid distended stomach with mild concentric wall thickening of the gastric pylorus and submucosal fat deposition throughout the gastric rugal folds. Correlate clinically for chronic gastritis and peptic ulcer disease. CTHead/CTA Head/Neck => 65% stenosis at the origin of the left internal carotid artery with a 40% stenosis at the origin of the right internal carotid artery by NASCET criteria. No intracranial vascular compromise. In the ED the patient was given Famotidine 20mg IVP x 1, and NS x 1L.   (12 Aug 2023 00:06)      PMHx: PAST MEDICAL & SURGICAL HISTORY:  BPH (benign prostatic hyperplasia)      HTN (hypertension)      HLD (hyperlipidemia)      GERD (gastroesophageal reflux disease)      NSVT (nonsustained ventricular tachycardia)      Palpitations      Prostate cancer  s/p radiation      CAD S/P percutaneous coronary angioplasty      S/P cardiac catheterization  diagnostic 12/15/14            Soc Hx:     FAMILY HISTORY:  Family history of MI (myocardial infarction) (Father)    Family history of uterine cancer (Mother)        Allergies: Allergies    No Known Allergies    Intolerances          REVIEW OF SYSTEMS:    As above  No chest pain or shortness of breath  + lightheadeness and syncope  No leg swelling  No palpitations  No claudication-like symptoms    Vital Signs Last 24 Hrs  T(C): 36.4 (12 Aug 2023 08:34), Max: 37.7 (11 Aug 2023 23:07)  T(F): 97.5 (12 Aug 2023 08:34), Max: 99.8 (11 Aug 2023 23:07)  HR: 71 (12 Aug 2023 08:34) (58 - 93)  BP: 150/50 (12 Aug 2023 08:34) (146/50 - 178/67)  BP(mean): --  RR: 18 (12 Aug 2023 08:34) (18 - 20)  SpO2: 94% (12 Aug 2023 08:34) (92% - 95%)    Parameters below as of 12 Aug 2023 08:34  Patient On (Oxygen Delivery Method): room air        I&O's Summary      CAPILLARY BLOOD GLUCOSE      POCT Blood Glucose.: 116 mg/dL (12 Aug 2023 08:01)      PHYSICAL EXAM:   Patient in NAD  Neck: No JVD; Carotids:  2+ without bruits  Respiratory:  Clear to A&P  Cardiovascular: S1 and S2, regular rate and rhythm, no Murmurs, gallops or rubs  Gastrointestinal:  Soft, non-tender; BS positive  Extremities: No peripheral edema  Vascular: 2+ peripheral pulses  Neurological: A/O x 3, no focal deficits      MEDICATIONS:  MEDICATIONS  (STANDING):  cefTRIAXone Injectable. 1000 milliGRAM(s) IV Push every 24 hours  clopidogrel Tablet 75 milliGRAM(s) Oral daily  cyanocobalamin 1000 MICROGram(s) Oral daily  dextrose 5%. 1000 milliLiter(s) (100 mL/Hr) IV Continuous <Continuous>  dextrose 5%. 1000 milliLiter(s) (50 mL/Hr) IV Continuous <Continuous>  dextrose 50% Injectable 25 Gram(s) IV Push once  dextrose 50% Injectable 12.5 Gram(s) IV Push once  dextrose 50% Injectable 25 Gram(s) IV Push once  enoxaparin Injectable 40 milliGRAM(s) SubCutaneous every 24 hours  glucagon  Injectable 1 milliGRAM(s) IntraMuscular once  insulin lispro (ADMELOG) corrective regimen sliding scale   SubCutaneous three times a day before meals  insulin lispro (ADMELOG) corrective regimen sliding scale   SubCutaneous at bedtime  metoprolol succinate ER 25 milliGRAM(s) Oral every 12 hours  pantoprazole    Tablet 40 milliGRAM(s) Oral before breakfast  pravastatin 40 milliGRAM(s) Oral at bedtime  tamsulosin 0.4 milliGRAM(s) Oral at bedtime    Home Medications:  calcium (as carbonate) 600 mg oral tablet: 1 tab(s) orally once a day (11 Aug 2023 17:02)  clopidogrel 75 mg oral tablet: 1 tab(s) orally once a day (11 Aug 2023 17:02)  cyanocobalamin 1000 mcg oral tablet: 1 tab(s) orally once a day (11 Aug 2023 17:02)  metFORMIN 500 mg oral tablet: 1 tab(s) orally once a day (11 Aug 2023 17:59)  metoprolol succinate 25 mg oral tablet, extended release: 1 tab(s) orally every 12 hours (11 Aug 2023 17:02)  omeprazole 20 mg oral delayed release tablet: 1 tab(s) orally once a day (11 Aug 2023 17:59)  pravastatin 40 mg oral tablet: 1 tab(s) orally once a day (11 Aug 2023 17:02)  tamsulosin 0.4 mg oral capsule: 1 cap(s) orally once a day (at bedtime) (11 Aug 2023 17:02)        LABS: All Labs Reviewed:  Blood Culture:   BNP   CBC             WBC Count: 6.84 K/uL (08-11 @ 14:04)              Hemoglobin: 12.0 g/dL (08-11 @ 14:04)              Hematocrit: 36.2 % (08-11 @ 14:04)              Mean Cell Volume: 87.9 fl (08-11 @ 14:04)              Platelet Count - Automated: 226 K/uL (08-11 @ 14:04)                            Cardiac markers   Troponin I, High Sensitivity (08.11.23 @ 14:04) Troponin I, High Sensitivity Result: 8.28                                       Chems        Sodium: 137 mmol/L (08-11 @ 14:04)          Potassium: 4.3 mmol/L (08-11 @ 14:04)          Blood Urea Nitrogen: 11 mg/dL (08-11 @ 14:04)          Creatinine 0.90                  Protein Total: 7.0 gm/dL (08-11 @ 14:04)                  Calcium: 8.8 mg/dL (08-11 @ 14:04)                  Bilirubin Total: 0.3 mg/dL (08-11 @ 14:04)          Alanine Aminotransferase (ALT/SGPT): 21 U/L (08-11 @ 14:04)          Aspartate Aminotransferase (AST/SGOT): 26 U/L (08-11 @ 14:04)                 INR: 0.95 ratio (08-11 @ 14:04)             RADIOLOGY:< from: CT Angio Head w/ IV Cont (08.11.23 @ 16:01) >  IMPRESSION: 65% stenosis at the origin of the left internal carotid   artery with a 40% stenosis at the origin of the right internal carotid   artery by NASCET criteria. No intracranial vascular compromise    < end of copied text >      EKG:  SB/first degree AV block    Telemetry:  Off monitor(was sinus when he was wearing the monitor)    ECHO:    
HPI as per chart: 89 year old male PMHx significant for CAD s/p PCI w/ stent placement, Hx NSVT, Prostate Cancer, BPH, Diabetes Mellitus Type 2, GERD, Hypertension, Hyperlipidemia was BIBA from PCP's office for further evaluation and management s/p witnessed syncopal episode. Of note the patient was being evaluated for ongoing symptoms of intractable dizziness over the past 2 days, with progression to gait disturbance, and 2 episodes of vomiting (non-bloody). Of note there was no associated head trauma, prodrome of shortness of breath, blurry vision, or chest pain. Vital Signs in Triage => /67, HR 58/min, RR 18/min, TMax 97.5'F, SpO2 94% on room air. Labs => UA (+). CXR => Slight linear atelectasis left base at this time. CT ABD/Pelvis w/ IV Contrast => Fluid distended stomach with mild concentric wall thickening of the gastric pylorus and submucosal fat deposition throughout the gastric rugal folds. Correlate clinically for chronic gastritis and peptic ulcer disease. CTHead/CTA Head/Neck => 65% stenosis at the origin of the left internal carotid artery with a 40% stenosis at the origin of the right internal carotid artery by NASCET criteria. No intracranial vascular compromise. In the ED the patient was given Famotidine 20mg IVP x 1, and NS x 1L.    Vitals:  T(C): 36.4 (08-12 @ 08:34), Max: 37.7 (08-11 @ 23:07)  HR: 71 (08-12 @ 08:34) (71 - 93)  BP: 150/50 (08-12 @ 08:34) (146/50 - 167/81)  RR: 18 (08-12 @ 08:34) (18 - 20)  SpO2: 94% (08-12 @ 08:34) (92% - 95%)      Physical Exam:  General: AAOx3, Elderly Well developed, NAD  Chest: Normal respiratory effort  Heart: RRR  Abdomen: Soft, NTND, no masses  Neuro/Psych: No localized deficits. CN2-12 in tactNormal speech, normal tone  Skin: Normal, no rashes, no lesions noted.   Extremities: Warm, well perfused, no edema, Pulses intact    08-12 @ 12:20                    11.8  CBC: 10.07>)-------(<241                     35.8                 141 | 107 | 10    CMP:  ----------------------< 103               3.3 | 29 | 0.87                      Ca:9.0  Phos:-  Mg:-               0.3|      |20        LFTs:  ------|96|-----             -|      |-  08-11 @ 14:04                    12.0  CBC: 6.84>)-------(<226                     36.2                 137 | 106 | 11    CMP:  ----------------------< 145               4.3 | 27 | 0.90                      Ca:8.8  Phos:-  Mg:-               0.3|      |26        LFTs:  ------|100|-----             -|      |-      Current Inpatient Medications:  acetaminophen     Tablet .. 650 milliGRAM(s) Oral every 6 hours PRN  aluminum hydroxide/magnesium hydroxide/simethicone Suspension 30 milliLiter(s) Oral every 4 hours PRN  cefTRIAXone Injectable. 1000 milliGRAM(s) IV Push every 24 hours  clopidogrel Tablet 75 milliGRAM(s) Oral daily  cyanocobalamin 1000 MICROGram(s) Oral daily  dextrose 5%. 1000 milliLiter(s) (100 mL/Hr) IV Continuous <Continuous>  dextrose 5%. 1000 milliLiter(s) (50 mL/Hr) IV Continuous <Continuous>  dextrose 50% Injectable 25 Gram(s) IV Push once  dextrose 50% Injectable 12.5 Gram(s) IV Push once  dextrose 50% Injectable 25 Gram(s) IV Push once  dextrose Oral Gel 15 Gram(s) Oral once PRN  enoxaparin Injectable 40 milliGRAM(s) SubCutaneous every 24 hours  glucagon  Injectable 1 milliGRAM(s) IntraMuscular once  insulin lispro (ADMELOG) corrective regimen sliding scale   SubCutaneous three times a day before meals  insulin lispro (ADMELOG) corrective regimen sliding scale   SubCutaneous at bedtime  melatonin 3 milliGRAM(s) Oral at bedtime PRN  metoprolol succinate ER 25 milliGRAM(s) Oral every 12 hours  ondansetron Injectable 4 milliGRAM(s) IV Push every 8 hours PRN  pantoprazole    Tablet 40 milliGRAM(s) Oral before breakfast  pravastatin 40 milliGRAM(s) Oral at bedtime  tamsulosin 0.4 milliGRAM(s) Oral at bedtime

## 2023-08-12 NOTE — CHART NOTE - NSCHARTNOTEFT_GEN_A_CORE
pt wants to sign AMA. HE is alert oriented to time, place and person. Family at bedside.   pt does not want to stay further to continue his evaluation for syncope. I explained to him that we do not know etiology of his syncope and plan is to have him monitored in tele and EP cardiology for evaluation, possible LINQ or short term monitoring. I explained to him that since leaving hospital is premature that incidence can happen again, leads to death. He understood me and verbalized me back and signed AMA.     Guided pt to follow up with his pmd, / Phil and EP cardiologist as an outpt.     #I also discussed with pt about his CT finding of stomach wall thickening, need for EGD, prostate enlargement     #Discussed with family at bedside and explained the same

## 2023-08-12 NOTE — CONSULT NOTE ADULT - ASSESSMENT
89yoM presenting with syncope. 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  Continue Medical management as per primary team    Plan discussed with Dr. Jimeenz
89 year old man with the above PMH who was admitted status post a syncopal event.  No evidence for ACS.  While on the monitor there were no significant arrhythmias seen.  He has the above carotid abnormalities but I don't feel it is related to his syncopal event and is also not high grade(more than 80%).  If we can get him to wear the monitor and if he were to display significant bradykardia I would then decrease or D/C his b blocker.  I will put in an order for a TTE.

## 2023-08-12 NOTE — PROGRESS NOTE ADULT - SUBJECTIVE AND OBJECTIVE BOX
HPI:  89 year old male PMHx significant for CAD s/p PCI w/ stent placement, Hx NSVT, Prostate Cancer, BPH, Diabetes Mellitus Type 2, GERD, Hypertension, Hyperlipidemia was BIBA from PCP's office for further evaluation and management s/p witnessed syncopal episode. Of note the patient was being evaluated for ongoing symptoms of intractable dizziness over the past 2 days, with progression to gait disturbance, and 2 episodes of vomiting (non-bloody). Of note there was no associated head trauma, prodrome of shortness of breath, blurry vision, or chest pain. Vital Signs in Triage => /67, HR 58/min, RR 18/min, TMax 97.5'F, SpO2 94% on room air. Labs => UA (+). CXR => Slight linear atelectasis left base at this time. CT ABD/Pelvis w/ IV Contrast => Fluid distended stomach with mild concentric wall thickening of the gastric pylorus and submucosal fat deposition throughout the gastric rugal folds. Correlate clinically for chronic gastritis and peptic ulcer disease. CTHead/CTA Head/Neck => 65% stenosis at the origin of the left internal carotid artery with a 40% stenosis at the origin of the right internal carotid artery by NASCET criteria. No intracranial vascular compromise. In the ED the patient was given Famotidine 20mg IVP x 1, and NS x 1L.   (12 Aug 2023 00:06)      Review of Systems:  CONSTITUTIONAL:  feeling of dizzi or passing out   EYES/ENT: No visual changes;  No vertigo or throat pain   NECK: No pain or stiffness  RESPIRATORY: No cough, wheezing, hemoptysis; No shortness of breath,   CARDIOVASCULAR: No chest pain or palpitations  GASTROINTESTINAL: No abdominal or epigastric pain. No nausea, vomiting, or hematemesis; No diarrhea or constipation.   GENITOURINARY: No dysuria, frequency or hematuria  NEUROLOGICAL: No numbness or weakness  SKIN: No itching, burning, rashes, or lesions   All other review of systems is negative unless indicated above    PHYSICAL EXAM:    Vital Signs Last 24 Hrs  T(C): 36.4 (12 Aug 2023 08:34), Max: 37.7 (11 Aug 2023 23:07)  T(F): 97.5 (12 Aug 2023 08:34), Max: 99.8 (11 Aug 2023 23:07)  HR: 71 (12 Aug 2023 08:34) (58 - 93)  BP: 150/50 (12 Aug 2023 08:34) (146/50 - 178/67)  BP(mean): --  RR: 18 (12 Aug 2023 08:34) (18 - 20)  SpO2: 94% (12 Aug 2023 08:34) (92% - 95%)    Parameters below as of 12 Aug 2023 08:34  Patient On (Oxygen Delivery Method): room air        GENERAL: comfortable   HEAD:  Atraumatic, Normocephalic  EYES: EOMI, PERRLA, conjunctiva and sclera clear  HEENT: Moist mucous membranes  NECK: Supple, No JVD  NERVOUS SYSTEM:  Alert & Oriented X3, Motor Strength 5/5 B/L upper and lower extremities; DTRs 2+ intact and symmetric  CHEST/LUNG: Clear to auscultation bilaterally; No rales, rhonchi, wheezing, or rubs  HEART:S1S2 normal, no murmer  ABDOMEN: Soft, Nontender, Nondistended; Bowel sounds present  GENITOURINARY- Voiding, no palpable bladder  EXTREMITIES:  2+ Peripheral Pulses, No clubbing, cyanosis, or edema  MUSCULOSKELTAL- No muscle tenderness, Muscle tone normal, No joint tenderness, no Joint swelling, Joint range of motion-normal  SKIN-no rash, no lesion  PSYCH- Mood stable  LYMPH Node- No palpable lymph node    LABS:                        12.0   6.84  )-----------( 226      ( 11 Aug 2023 14:04 )             36.2     08-11    137  |  106  |  11  ----------------------------<  145<H>  4.3   |  27  |  0.90    Ca    8.8      11 Aug 2023 14:04    TPro  7.0  /  Alb  3.2<L>  /  TBili  0.3  /  DBili  x   /  AST  26  /  ALT  21  /  AlkPhos  100  08-11    PT/INR - ( 11 Aug 2023 14:04 )   PT: 10.8 sec;   INR: 0.95 ratio         PTT - ( 11 Aug 2023 14:04 )  PTT:26.6 sec  Urinalysis Basic - ( 11 Aug 2023 18:25 )    Color: Yellow / Appearance: Clear / S.010 / pH: x  Gluc: x / Ketone: Moderate  / Bili: Negative / Urobili: Negative   Blood: x / Protein: 30 mg/dL / Nitrite: Negative   Leuk Esterase: Small / RBC: 3-5 /HPF / WBC 26-50 /HPF   Sq Epi: x / Non Sq Epi: x / Bacteria: Occasional        CAPILLARY BLOOD GLUCOSE      POCT Blood Glucose.: 116 mg/dL (12 Aug 2023 08:01)            Standing medicine  acetaminophen     Tablet .. 650 milliGRAM(s) Oral every 6 hours PRN  aluminum hydroxide/magnesium hydroxide/simethicone Suspension 30 milliLiter(s) Oral every 4 hours PRN  cefTRIAXone Injectable. 1000 milliGRAM(s) IV Push every 24 hours  clopidogrel Tablet 75 milliGRAM(s) Oral daily  cyanocobalamin 1000 MICROGram(s) Oral daily  dextrose 5%. 1000 milliLiter(s) IV Continuous <Continuous>  dextrose 5%. 1000 milliLiter(s) IV Continuous <Continuous>  dextrose 50% Injectable 25 Gram(s) IV Push once  dextrose 50% Injectable 12.5 Gram(s) IV Push once  dextrose 50% Injectable 25 Gram(s) IV Push once  dextrose Oral Gel 15 Gram(s) Oral once PRN  enoxaparin Injectable 40 milliGRAM(s) SubCutaneous every 24 hours  glucagon  Injectable 1 milliGRAM(s) IntraMuscular once  insulin lispro (ADMELOG) corrective regimen sliding scale   SubCutaneous three times a day before meals  insulin lispro (ADMELOG) corrective regimen sliding scale   SubCutaneous at bedtime  melatonin 3 milliGRAM(s) Oral at bedtime PRN  metoprolol succinate ER 25 milliGRAM(s) Oral every 12 hours  ondansetron Injectable 4 milliGRAM(s) IV Push every 8 hours PRN  pantoprazole    Tablet 40 milliGRAM(s) Oral before breakfast  pravastatin 40 milliGRAM(s) Oral at bedtime  tamsulosin 0.4 milliGRAM(s) Oral at bedtime      A/P    #Syncope  -ct tele  -cardi evaluation     #Carotid Stenosis  ~CT imaging revealed 65% stenosis at the origin of the left internal carotid artery with a 40% stenosis at the origin of the right internal carotid artery by NASCET criteria  ~f/u w/ Vascular Surgery consultation    #CAD/Hx PCI  ~cont. Clopidogrel 75mg po daily    #Hypertension  ~cont. Metoprolol ER 25mg po q12h    #Hyperlipidemia  ~cont. Pravastatin 40mg po qhs    #Diabetes Mellitus Type 2  ~FS qAC/HS  ~cont. ISS per protocol  ~cont. low carb diet    #GERD/Gastritis  ~cont. Omeprazole 20mg po daily    #BPH  ~cont. Tamsulosin     #Vte ppx  ~cont. LMW

## 2023-08-12 NOTE — H&P ADULT - NSHPPHYSICALEXAM_GEN_ALL_CORE
Vital Signs Last 24 Hrs  T(C): 37.7 (11 Aug 2023 23:07), Max: 37.7 (11 Aug 2023 23:07)  T(F): 99.8 (11 Aug 2023 23:07), Max: 99.8 (11 Aug 2023 23:07)  HR: 82 (11 Aug 2023 23:07) (58 - 93)  BP: 146/50 (11 Aug 2023 23:07) (146/50 - 178/67)  RR: 18 (11 Aug 2023 23:07) (18 - 20)  SpO2: 92% (11 Aug 2023 23:07) (92% - 95%)    Parameters below as of 11 Aug 2023 23:07  Patient On (Oxygen Delivery Method): room air

## 2023-08-12 NOTE — CONSULT NOTE ADULT - ATTENDING COMMENTS
Patient with dizziness and asymptomatic carotid stenosis. No prior history of TIA or stroke.    Carotid imaging reviewed-- moderate stenosis bilaterally    Plan  DAPT and Statin tx daily  No intervention indicated at this time  Follow up as needed

## 2023-08-13 DIAGNOSIS — I65.29 OCCLUSION AND STENOSIS OF UNSPECIFIED CAROTID ARTERY: ICD-10-CM

## 2023-08-13 LAB
CULTURE RESULTS: SIGNIFICANT CHANGE UP
SPECIMEN SOURCE: SIGNIFICANT CHANGE UP

## 2023-08-17 DIAGNOSIS — E78.5 HYPERLIPIDEMIA, UNSPECIFIED: ICD-10-CM

## 2023-08-17 DIAGNOSIS — I65.23 OCCLUSION AND STENOSIS OF BILATERAL CAROTID ARTERIES: ICD-10-CM

## 2023-08-17 DIAGNOSIS — Z95.5 PRESENCE OF CORONARY ANGIOPLASTY IMPLANT AND GRAFT: ICD-10-CM

## 2023-08-17 DIAGNOSIS — R55 SYNCOPE AND COLLAPSE: ICD-10-CM

## 2023-08-17 DIAGNOSIS — N40.0 BENIGN PROSTATIC HYPERPLASIA WITHOUT LOWER URINARY TRACT SYMPTOMS: ICD-10-CM

## 2023-08-17 DIAGNOSIS — E11.9 TYPE 2 DIABETES MELLITUS WITHOUT COMPLICATIONS: ICD-10-CM

## 2023-08-17 DIAGNOSIS — I25.10 ATHEROSCLEROTIC HEART DISEASE OF NATIVE CORONARY ARTERY WITHOUT ANGINA PECTORIS: ICD-10-CM

## 2023-08-17 DIAGNOSIS — I10 ESSENTIAL (PRIMARY) HYPERTENSION: ICD-10-CM

## 2023-08-17 DIAGNOSIS — K21.9 GASTRO-ESOPHAGEAL REFLUX DISEASE WITHOUT ESOPHAGITIS: ICD-10-CM

## 2023-08-23 ENCOUNTER — APPOINTMENT (OUTPATIENT)
Dept: OTOLARYNGOLOGY | Facility: CLINIC | Age: 88
End: 2023-08-23
Payer: MEDICARE

## 2023-08-23 VITALS — WEIGHT: 145 LBS | HEIGHT: 67 IN | BODY MASS INDEX: 22.76 KG/M2

## 2023-08-23 DIAGNOSIS — H90.A31 MIXED CONDUCTIVE AND SENSORINEURAL HEARING LOSS, UNILATERAL, RIGHT EAR WITH RESTRICTED HEARING ON THE  CONTRALATERAL SIDE: ICD-10-CM

## 2023-08-23 DIAGNOSIS — H91.22 SUDDEN IDIOPATHIC HEARING LOSS, LEFT EAR: ICD-10-CM

## 2023-08-23 DIAGNOSIS — R42 DIZZINESS AND GIDDINESS: ICD-10-CM

## 2023-08-23 DIAGNOSIS — R26.89 OTHER ABNORMALITIES OF GAIT AND MOBILITY: ICD-10-CM

## 2023-08-23 DIAGNOSIS — H90.3 SENSORINEURAL HEARING LOSS, BILATERAL: ICD-10-CM

## 2023-08-23 DIAGNOSIS — H72.01 CENTRAL PERFORATION OF TYMPANIC MEMBRANE, RIGHT EAR: ICD-10-CM

## 2023-08-23 PROCEDURE — 92567 TYMPANOMETRY: CPT

## 2023-08-23 PROCEDURE — 92557 COMPREHENSIVE HEARING TEST: CPT

## 2023-08-23 PROCEDURE — 99204 OFFICE O/P NEW MOD 45 MIN: CPT

## 2023-08-23 NOTE — REASON FOR VISIT
[Initial Consultation] : an initial consultation for [Hearing Loss] : hearing loss [Spouse] : spouse [Family Member] : family member [Other: _____] : [unfilled] [FreeTextEntry2] : ears

## 2023-08-23 NOTE — DATA REVIEWED
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

## 2023-08-23 NOTE — HISTORY OF PRESENT ILLNESS
[de-identified] : C/o problems hearing and has had balance issues - problem is only past week.  No blackout or diplopia - has vertigo - feels off balance. Hx of hearing loss in right ear since age 2.  Problem with left ear for only past week.

## 2023-08-23 NOTE — PHYSICAL EXAM
[Midline] : trachea located in midline position [Normal] : no rashes [de-identified] : right subtotal perf; left clear and normal

## 2023-08-23 NOTE — ASSESSMENT
[FreeTextEntry1] : Patient with long hx of right hearing loss since childhood but for past week noted balance issues, ? vertigo, and significant decrease in hearing in left ear.   Patient does have subtotal perf of right tm.  Left ear normal but has snhl .  Patient and family feel this hearing loss in new.  Feel possible suuden left snhl - can not start oral steroids due to diabetes .  Recommended MRI and also recommended eval with Dr Lala for possibel IT steroid injection.  Patient will see Dr Lala in approx one week.

## 2023-08-24 ENCOUNTER — APPOINTMENT (OUTPATIENT)
Dept: OTOLARYNGOLOGY | Facility: CLINIC | Age: 88
End: 2023-08-24
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
## 2023-08-28 ENCOUNTER — APPOINTMENT (OUTPATIENT)
Dept: OTOLARYNGOLOGY | Facility: CLINIC | Age: 88
End: 2023-08-28

## 2023-10-11 ENCOUNTER — OUTPATIENT (OUTPATIENT)
Dept: OUTPATIENT SERVICES | Facility: HOSPITAL | Age: 88
LOS: 1 days | Discharge: HOME | End: 2023-10-11

## 2023-10-11 VITALS
TEMPERATURE: 97 F | HEART RATE: 77 BPM | HEIGHT: 67 IN | RESPIRATION RATE: 18 BRPM | OXYGEN SATURATION: 97 % | DIASTOLIC BLOOD PRESSURE: 68 MMHG | SYSTOLIC BLOOD PRESSURE: 166 MMHG | WEIGHT: 139.99 LBS

## 2023-10-11 DIAGNOSIS — Z98.89 OTHER SPECIFIED POSTPROCEDURAL STATES: Chronic | ICD-10-CM

## 2023-10-11 DIAGNOSIS — R10.9 UNSPECIFIED ABDOMINAL PAIN: ICD-10-CM

## 2023-10-11 DIAGNOSIS — K21.9 GASTRO-ESOPHAGEAL REFLUX DISEASE WITHOUT ESOPHAGITIS: ICD-10-CM

## 2023-10-11 NOTE — PROVIDER CONTACT NOTE (OTHER) - RECOMMENDATIONS
Advised patient to contact Dr. Castillo to reschedule. Iv d'cd,  Pt escorted to wife and daughter to go home.

## 2023-10-11 NOTE — PROVIDER CONTACT NOTE (OTHER) - SITUATION
Patient decided he did not want to have Egd today.  Encouraged patient to stay since Dr Castillo felt the procedure was appropriate.  Pt A&Ox4 was adamant he was leaving.

## 2024-06-05 NOTE — DISCHARGE NOTE NURSING/CASE MANAGEMENT/SOCIAL WORK - NSPROMEDSBROUGHTTOHOSP_GEN_A_NUR
ED Purposeful Rounding: Patient updated with plan of care. Patient pain, bathroom needs, positioning and comfort (warm blankets, dim lights, TV, Bed positioning, personal items) needs addressed. Patient given and instructed on use of call light.      
ED Purposeful Rounding: Patient updated with plan of care. Patient pain, bathroom needs, positioning and comfort (warm blankets, dim lights, TV, Bed positioning, personal items) needs addressed. Patient given and instructed on use of call light.      
no

## 2024-06-11 ENCOUNTER — INPATIENT (INPATIENT)
Facility: HOSPITAL | Age: 89
LOS: 12 days | Discharge: HOSPICE | DRG: 684 | End: 2024-06-24
Attending: FAMILY MEDICINE | Admitting: INTERNAL MEDICINE
Payer: MEDICARE

## 2024-06-11 VITALS
HEART RATE: 66 BPM | DIASTOLIC BLOOD PRESSURE: 55 MMHG | TEMPERATURE: 98 F | WEIGHT: 136.25 LBS | OXYGEN SATURATION: 98 % | SYSTOLIC BLOOD PRESSURE: 174 MMHG | RESPIRATION RATE: 18 BRPM

## 2024-06-11 DIAGNOSIS — Z98.89 OTHER SPECIFIED POSTPROCEDURAL STATES: Chronic | ICD-10-CM

## 2024-06-11 DIAGNOSIS — N17.9 ACUTE KIDNEY FAILURE, UNSPECIFIED: ICD-10-CM

## 2024-06-11 LAB
ALBUMIN SERPL ELPH-MCNC: 2.8 G/DL — LOW (ref 3.3–5)
ALP SERPL-CCNC: 84 U/L — SIGNIFICANT CHANGE UP (ref 40–120)
ALT FLD-CCNC: 12 U/L — SIGNIFICANT CHANGE UP (ref 12–78)
ANION GAP SERPL CALC-SCNC: 4 MMOL/L — LOW (ref 5–17)
ANION GAP SERPL CALC-SCNC: 4 MMOL/L — LOW (ref 5–17)
APPEARANCE UR: ABNORMAL
APTT BLD: 28.4 SEC — SIGNIFICANT CHANGE UP (ref 24.5–35.6)
AST SERPL-CCNC: 32 U/L — SIGNIFICANT CHANGE UP (ref 15–37)
BACTERIA # UR AUTO: NEGATIVE /HPF — SIGNIFICANT CHANGE UP
BASOPHILS # BLD AUTO: 0.03 K/UL — SIGNIFICANT CHANGE UP (ref 0–0.2)
BASOPHILS NFR BLD AUTO: 0.4 % — SIGNIFICANT CHANGE UP (ref 0–2)
BILIRUB SERPL-MCNC: 0.4 MG/DL — SIGNIFICANT CHANGE UP (ref 0.2–1.2)
BILIRUB UR-MCNC: NEGATIVE — SIGNIFICANT CHANGE UP
BLD GP AB SCN SERPL QL: SIGNIFICANT CHANGE UP
BUN SERPL-MCNC: 15 MG/DL — SIGNIFICANT CHANGE UP (ref 7–23)
BUN SERPL-MCNC: 16 MG/DL — SIGNIFICANT CHANGE UP (ref 7–23)
CALCIUM SERPL-MCNC: 8.5 MG/DL — SIGNIFICANT CHANGE UP (ref 8.5–10.1)
CALCIUM SERPL-MCNC: 9.2 MG/DL — SIGNIFICANT CHANGE UP (ref 8.5–10.1)
CHLORIDE SERPL-SCNC: 106 MMOL/L — SIGNIFICANT CHANGE UP (ref 96–108)
CHLORIDE SERPL-SCNC: 106 MMOL/L — SIGNIFICANT CHANGE UP (ref 96–108)
CO2 SERPL-SCNC: 30 MMOL/L — SIGNIFICANT CHANGE UP (ref 22–31)
CO2 SERPL-SCNC: 32 MMOL/L — HIGH (ref 22–31)
COLOR SPEC: ABNORMAL
CREAT SERPL-MCNC: 1.35 MG/DL — HIGH (ref 0.5–1.3)
CREAT SERPL-MCNC: 1.41 MG/DL — HIGH (ref 0.5–1.3)
DIFF PNL FLD: ABNORMAL
EGFR: 47 ML/MIN/1.73M2 — LOW
EGFR: 49 ML/MIN/1.73M2 — LOW
EOSINOPHIL # BLD AUTO: 0.05 K/UL — SIGNIFICANT CHANGE UP (ref 0–0.5)
EOSINOPHIL NFR BLD AUTO: 0.6 % — SIGNIFICANT CHANGE UP (ref 0–6)
GLUCOSE SERPL-MCNC: 111 MG/DL — HIGH (ref 70–99)
GLUCOSE SERPL-MCNC: 145 MG/DL — HIGH (ref 70–99)
GLUCOSE UR QL: NEGATIVE MG/DL — SIGNIFICANT CHANGE UP
HCT VFR BLD CALC: 27.7 % — LOW (ref 39–50)
HGB BLD-MCNC: 8.9 G/DL — LOW (ref 13–17)
IMM GRANULOCYTES NFR BLD AUTO: 0.2 % — SIGNIFICANT CHANGE UP (ref 0–0.9)
INR BLD: 1.08 RATIO — SIGNIFICANT CHANGE UP (ref 0.85–1.18)
KETONES UR-MCNC: NEGATIVE MG/DL — SIGNIFICANT CHANGE UP
LACTATE SERPL-SCNC: 1.3 MMOL/L — SIGNIFICANT CHANGE UP (ref 0.7–2)
LEUKOCYTE ESTERASE UR-ACNC: ABNORMAL
LYMPHOCYTES # BLD AUTO: 1.43 K/UL — SIGNIFICANT CHANGE UP (ref 1–3.3)
LYMPHOCYTES # BLD AUTO: 17.6 % — SIGNIFICANT CHANGE UP (ref 13–44)
MAGNESIUM SERPL-MCNC: 1.9 MG/DL — SIGNIFICANT CHANGE UP (ref 1.6–2.6)
MCHC RBC-ENTMCNC: 27.3 PG — SIGNIFICANT CHANGE UP (ref 27–34)
MCHC RBC-ENTMCNC: 32.1 GM/DL — SIGNIFICANT CHANGE UP (ref 32–36)
MCV RBC AUTO: 85 FL — SIGNIFICANT CHANGE UP (ref 80–100)
MONOCYTES # BLD AUTO: 0.68 K/UL — SIGNIFICANT CHANGE UP (ref 0–0.9)
MONOCYTES NFR BLD AUTO: 8.4 % — SIGNIFICANT CHANGE UP (ref 2–14)
NEUTROPHILS # BLD AUTO: 5.9 K/UL — SIGNIFICANT CHANGE UP (ref 1.8–7.4)
NEUTROPHILS NFR BLD AUTO: 72.8 % — SIGNIFICANT CHANGE UP (ref 43–77)
NITRITE UR-MCNC: NEGATIVE — SIGNIFICANT CHANGE UP
PH UR: 6.5 — SIGNIFICANT CHANGE UP (ref 5–8)
PLATELET # BLD AUTO: 275 K/UL — SIGNIFICANT CHANGE UP (ref 150–400)
POTASSIUM SERPL-MCNC: 2.7 MMOL/L — CRITICAL LOW (ref 3.5–5.3)
POTASSIUM SERPL-MCNC: 3.1 MMOL/L — LOW (ref 3.5–5.3)
POTASSIUM SERPL-SCNC: 2.7 MMOL/L — CRITICAL LOW (ref 3.5–5.3)
POTASSIUM SERPL-SCNC: 3.1 MMOL/L — LOW (ref 3.5–5.3)
PROT SERPL-MCNC: 7 GM/DL — SIGNIFICANT CHANGE UP (ref 6–8.3)
PROT UR-MCNC: 100 MG/DL
PROTHROM AB SERPL-ACNC: 12.2 SEC — SIGNIFICANT CHANGE UP (ref 9.5–13)
RBC # BLD: 3.26 M/UL — LOW (ref 4.2–5.8)
RBC # FLD: 14.8 % — HIGH (ref 10.3–14.5)
RBC CASTS # UR COMP ASSIST: 1828 /HPF — HIGH (ref 0–4)
SODIUM SERPL-SCNC: 140 MMOL/L — SIGNIFICANT CHANGE UP (ref 135–145)
SODIUM SERPL-SCNC: 142 MMOL/L — SIGNIFICANT CHANGE UP (ref 135–145)
SP GR SPEC: 1.01 — SIGNIFICANT CHANGE UP (ref 1–1.03)
SQUAMOUS # UR AUTO: 1 /HPF — SIGNIFICANT CHANGE UP (ref 0–5)
UROBILINOGEN FLD QL: 1 MG/DL — SIGNIFICANT CHANGE UP (ref 0.2–1)
WBC # BLD: 8.11 K/UL — SIGNIFICANT CHANGE UP (ref 3.8–10.5)
WBC # FLD AUTO: 8.11 K/UL — SIGNIFICANT CHANGE UP (ref 3.8–10.5)
WBC UR QL: 260 /HPF — HIGH (ref 0–5)

## 2024-06-11 PROCEDURE — 82746 ASSAY OF FOLIC ACID SERUM: CPT

## 2024-06-11 PROCEDURE — 97162 PT EVAL MOD COMPLEX 30 MIN: CPT | Mod: GP

## 2024-06-11 PROCEDURE — 99223 1ST HOSP IP/OBS HIGH 75: CPT

## 2024-06-11 PROCEDURE — 92610 EVALUATE SWALLOWING FUNCTION: CPT | Mod: GN

## 2024-06-11 PROCEDURE — 82728 ASSAY OF FERRITIN: CPT

## 2024-06-11 PROCEDURE — 82607 VITAMIN B-12: CPT

## 2024-06-11 PROCEDURE — 93005 ELECTROCARDIOGRAM TRACING: CPT

## 2024-06-11 PROCEDURE — 85027 COMPLETE CBC AUTOMATED: CPT

## 2024-06-11 PROCEDURE — 83036 HEMOGLOBIN GLYCOSYLATED A1C: CPT

## 2024-06-11 PROCEDURE — 76770 US EXAM ABDO BACK WALL COMP: CPT

## 2024-06-11 PROCEDURE — 83735 ASSAY OF MAGNESIUM: CPT

## 2024-06-11 PROCEDURE — 86900 BLOOD TYPING SEROLOGIC ABO: CPT

## 2024-06-11 PROCEDURE — 36415 COLL VENOUS BLD VENIPUNCTURE: CPT

## 2024-06-11 PROCEDURE — 71045 X-RAY EXAM CHEST 1 VIEW: CPT

## 2024-06-11 PROCEDURE — 80048 BASIC METABOLIC PNL TOTAL CA: CPT

## 2024-06-11 PROCEDURE — 86901 BLOOD TYPING SEROLOGIC RH(D): CPT

## 2024-06-11 PROCEDURE — 97530 THERAPEUTIC ACTIVITIES: CPT | Mod: GP

## 2024-06-11 PROCEDURE — 83540 ASSAY OF IRON: CPT

## 2024-06-11 PROCEDURE — 83550 IRON BINDING TEST: CPT

## 2024-06-11 PROCEDURE — 71045 X-RAY EXAM CHEST 1 VIEW: CPT | Mod: 26

## 2024-06-11 PROCEDURE — 92526 ORAL FUNCTION THERAPY: CPT | Mod: GN

## 2024-06-11 PROCEDURE — 85025 COMPLETE CBC W/AUTO DIFF WBC: CPT

## 2024-06-11 PROCEDURE — 86850 RBC ANTIBODY SCREEN: CPT

## 2024-06-11 PROCEDURE — 84100 ASSAY OF PHOSPHORUS: CPT

## 2024-06-11 PROCEDURE — 97116 GAIT TRAINING THERAPY: CPT | Mod: GP

## 2024-06-11 PROCEDURE — 82962 GLUCOSE BLOOD TEST: CPT

## 2024-06-11 PROCEDURE — 99285 EMERGENCY DEPT VISIT HI MDM: CPT | Mod: FS

## 2024-06-11 RX ORDER — CYANOCOBALAMIN (VITAMIN B-12) 1000 MCG
1000 TABLET, EXTENDED RELEASE ORAL DAILY
Refills: 0 | Status: DISCONTINUED | OUTPATIENT
Start: 2024-06-11 | End: 2024-06-24

## 2024-06-11 RX ORDER — METOPROLOL TARTRATE 50 MG
25 TABLET ORAL
Refills: 0 | Status: DISCONTINUED | OUTPATIENT
Start: 2024-06-11 | End: 2024-06-12

## 2024-06-11 RX ORDER — TAMSULOSIN HYDROCHLORIDE 0.4 MG/1
1 CAPSULE ORAL
Qty: 0 | Refills: 0 | DISCHARGE

## 2024-06-11 RX ORDER — CEFTRIAXONE SODIUM 500 MG
1000 VIAL (EA) INJECTION ONCE
Refills: 0 | Status: COMPLETED | OUTPATIENT
Start: 2024-06-11 | End: 2024-06-11

## 2024-06-11 RX ORDER — TAMSULOSIN HYDROCHLORIDE 0.4 MG/1
0.4 CAPSULE ORAL AT BEDTIME
Refills: 0 | Status: DISCONTINUED | OUTPATIENT
Start: 2024-06-11 | End: 2024-06-24

## 2024-06-11 RX ORDER — ONDANSETRON HYDROCHLORIDE 2 MG/ML
4 INJECTION INTRAMUSCULAR; INTRAVENOUS EVERY 8 HOURS
Refills: 0 | Status: DISCONTINUED | OUTPATIENT
Start: 2024-06-11 | End: 2024-06-24

## 2024-06-11 RX ORDER — ATORVASTATIN CALCIUM 20 MG/1
10 TABLET, FILM COATED ORAL AT BEDTIME
Refills: 0 | Status: DISCONTINUED | OUTPATIENT
Start: 2024-06-11 | End: 2024-06-24

## 2024-06-11 RX ORDER — SODIUM CHLORIDE 0.9 % (FLUSH) 0.9 %
1000 SYRINGE (ML) INJECTION ONCE
Refills: 0 | Status: COMPLETED | OUTPATIENT
Start: 2024-06-11 | End: 2024-06-11

## 2024-06-11 RX ORDER — MAGNESIUM, ALUMINUM HYDROXIDE 400-400
30 TABLET,CHEWABLE ORAL EVERY 4 HOURS
Refills: 0 | Status: DISCONTINUED | OUTPATIENT
Start: 2024-06-11 | End: 2024-06-24

## 2024-06-11 RX ORDER — OMEPRAZOLE 10 MG/1
1 CAPSULE, DELAYED RELEASE ORAL
Refills: 0 | DISCHARGE

## 2024-06-11 RX ORDER — CEFTRIAXONE SODIUM 500 MG
1000 VIAL (EA) INJECTION ONCE
Refills: 0 | Status: DISCONTINUED | OUTPATIENT
Start: 2024-06-11 | End: 2024-06-11

## 2024-06-11 RX ORDER — POTASSIUM CHLORIDE 600 MG/1
40 TABLET, FILM COATED, EXTENDED RELEASE ORAL ONCE
Refills: 0 | Status: COMPLETED | OUTPATIENT
Start: 2024-06-11 | End: 2024-06-11

## 2024-06-11 RX ORDER — CALCIUM CARBONATE/VITAMIN D2 250 MG-125
1 TABLET ORAL
Refills: 0 | DISCHARGE

## 2024-06-11 RX ORDER — POTASSIUM CHLORIDE 600 MG/1
10 TABLET, FILM COATED, EXTENDED RELEASE ORAL
Refills: 0 | Status: COMPLETED | OUTPATIENT
Start: 2024-06-11 | End: 2024-06-11

## 2024-06-11 RX ORDER — CALCIUM CARBONATE/VITAMIN D2 250 MG-125
1 TABLET ORAL DAILY
Refills: 0 | Status: DISCONTINUED | OUTPATIENT
Start: 2024-06-11 | End: 2024-06-24

## 2024-06-11 RX ORDER — CEFTRIAXONE SODIUM 500 MG
1000 VIAL (EA) INJECTION EVERY 24 HOURS
Refills: 0 | Status: COMPLETED | OUTPATIENT
Start: 2024-06-12 | End: 2024-06-14

## 2024-06-11 RX ORDER — CALCIUM CARBONATE 500(1250)
1 TABLET ORAL
Qty: 0 | Refills: 0 | DISCHARGE

## 2024-06-11 RX ORDER — CLOPIDOGREL BISULFATE 75 MG/1
75 TABLET, FILM COATED ORAL DAILY
Refills: 0 | Status: DISCONTINUED | OUTPATIENT
Start: 2024-06-12 | End: 2024-06-21

## 2024-06-11 RX ORDER — ACETAMINOPHEN 325 MG
650 TABLET ORAL ONCE
Refills: 0 | Status: DISCONTINUED | OUTPATIENT
Start: 2024-06-11 | End: 2024-06-24

## 2024-06-11 RX ADMIN — Medication 25 MILLIGRAM(S): at 19:58

## 2024-06-11 RX ADMIN — Medication 1000 MILLIGRAM(S): at 18:45

## 2024-06-11 RX ADMIN — TAMSULOSIN HYDROCHLORIDE 0.4 MILLIGRAM(S): 0.4 CAPSULE ORAL at 22:02

## 2024-06-11 RX ADMIN — Medication 3 MILLIGRAM(S): at 22:02

## 2024-06-11 RX ADMIN — POTASSIUM CHLORIDE 100 MILLIEQUIVALENT(S): 600 TABLET, FILM COATED, EXTENDED RELEASE ORAL at 19:46

## 2024-06-11 RX ADMIN — ATORVASTATIN CALCIUM 10 MILLIGRAM(S): 20 TABLET, FILM COATED ORAL at 22:20

## 2024-06-11 RX ADMIN — Medication 500 MILLILITER(S): at 17:12

## 2024-06-11 RX ADMIN — POTASSIUM CHLORIDE 40 MILLIEQUIVALENT(S): 600 TABLET, FILM COATED, EXTENDED RELEASE ORAL at 16:52

## 2024-06-11 RX ADMIN — POTASSIUM CHLORIDE 100 MILLIEQUIVALENT(S): 600 TABLET, FILM COATED, EXTENDED RELEASE ORAL at 16:52

## 2024-06-11 RX ADMIN — POTASSIUM CHLORIDE 100 MILLIEQUIVALENT(S): 600 TABLET, FILM COATED, EXTENDED RELEASE ORAL at 20:58

## 2024-06-12 DIAGNOSIS — C7A.8 OTHER MALIGNANT NEUROENDOCRINE TUMORS: ICD-10-CM

## 2024-06-12 LAB
A1C WITH ESTIMATED AVERAGE GLUCOSE RESULT: 6.9 % — HIGH (ref 4–5.6)
ANION GAP SERPL CALC-SCNC: 7 MMOL/L — SIGNIFICANT CHANGE UP (ref 5–17)
BASOPHILS # BLD AUTO: 0.02 K/UL — SIGNIFICANT CHANGE UP (ref 0–0.2)
BASOPHILS NFR BLD AUTO: 0.2 % — SIGNIFICANT CHANGE UP (ref 0–2)
BUN SERPL-MCNC: 15 MG/DL — SIGNIFICANT CHANGE UP (ref 7–23)
CALCIUM SERPL-MCNC: 8.7 MG/DL — SIGNIFICANT CHANGE UP (ref 8.5–10.1)
CHLORIDE SERPL-SCNC: 113 MMOL/L — HIGH (ref 96–108)
CO2 SERPL-SCNC: 24 MMOL/L — SIGNIFICANT CHANGE UP (ref 22–31)
CREAT SERPL-MCNC: 1.39 MG/DL — HIGH (ref 0.5–1.3)
EGFR: 48 ML/MIN/1.73M2 — LOW
EOSINOPHIL # BLD AUTO: 0.01 K/UL — SIGNIFICANT CHANGE UP (ref 0–0.5)
EOSINOPHIL NFR BLD AUTO: 0.1 % — SIGNIFICANT CHANGE UP (ref 0–6)
ESTIMATED AVERAGE GLUCOSE: 151 MG/DL — HIGH (ref 68–114)
GLUCOSE BLDC GLUCOMTR-MCNC: 130 MG/DL — HIGH (ref 70–99)
GLUCOSE BLDC GLUCOMTR-MCNC: 136 MG/DL — HIGH (ref 70–99)
GLUCOSE BLDC GLUCOMTR-MCNC: 138 MG/DL — HIGH (ref 70–99)
GLUCOSE BLDC GLUCOMTR-MCNC: 141 MG/DL — HIGH (ref 70–99)
GLUCOSE SERPL-MCNC: 135 MG/DL — HIGH (ref 70–99)
HCT VFR BLD CALC: 26 % — LOW (ref 39–50)
HGB BLD-MCNC: 8.2 G/DL — LOW (ref 13–17)
IMM GRANULOCYTES NFR BLD AUTO: 0.3 % — SIGNIFICANT CHANGE UP (ref 0–0.9)
LYMPHOCYTES # BLD AUTO: 1.29 K/UL — SIGNIFICANT CHANGE UP (ref 1–3.3)
LYMPHOCYTES # BLD AUTO: 14.7 % — SIGNIFICANT CHANGE UP (ref 13–44)
MCHC RBC-ENTMCNC: 26.8 PG — LOW (ref 27–34)
MCHC RBC-ENTMCNC: 31.5 GM/DL — LOW (ref 32–36)
MCV RBC AUTO: 85 FL — SIGNIFICANT CHANGE UP (ref 80–100)
MONOCYTES # BLD AUTO: 0.68 K/UL — SIGNIFICANT CHANGE UP (ref 0–0.9)
MONOCYTES NFR BLD AUTO: 7.7 % — SIGNIFICANT CHANGE UP (ref 2–14)
NEUTROPHILS # BLD AUTO: 6.75 K/UL — SIGNIFICANT CHANGE UP (ref 1.8–7.4)
NEUTROPHILS NFR BLD AUTO: 77 % — SIGNIFICANT CHANGE UP (ref 43–77)
PLATELET # BLD AUTO: 255 K/UL — SIGNIFICANT CHANGE UP (ref 150–400)
POTASSIUM SERPL-MCNC: 3.6 MMOL/L — SIGNIFICANT CHANGE UP (ref 3.5–5.3)
POTASSIUM SERPL-SCNC: 3.6 MMOL/L — SIGNIFICANT CHANGE UP (ref 3.5–5.3)
RBC # BLD: 3.06 M/UL — LOW (ref 4.2–5.8)
RBC # FLD: 14.8 % — HIGH (ref 10.3–14.5)
SODIUM SERPL-SCNC: 144 MMOL/L — SIGNIFICANT CHANGE UP (ref 135–145)
WBC # BLD: 8.78 K/UL — SIGNIFICANT CHANGE UP (ref 3.8–10.5)
WBC # FLD AUTO: 8.78 K/UL — SIGNIFICANT CHANGE UP (ref 3.8–10.5)

## 2024-06-12 PROCEDURE — 99497 ADVNCD CARE PLAN 30 MIN: CPT | Mod: 25

## 2024-06-12 PROCEDURE — 93010 ELECTROCARDIOGRAM REPORT: CPT

## 2024-06-12 PROCEDURE — 99232 SBSQ HOSP IP/OBS MODERATE 35: CPT

## 2024-06-12 PROCEDURE — 99223 1ST HOSP IP/OBS HIGH 75: CPT

## 2024-06-12 RX ORDER — ENALAPRIL MALEATE 20 MG
1.25 TABLET ORAL EVERY 6 HOURS
Refills: 0 | Status: DISCONTINUED | OUTPATIENT
Start: 2024-06-12 | End: 2024-06-13

## 2024-06-12 RX ORDER — DEXTROSE 30 % IN WATER 30 %
15 VIAL (ML) INTRAVENOUS ONCE
Refills: 0 | Status: DISCONTINUED | OUTPATIENT
Start: 2024-06-12 | End: 2024-06-21

## 2024-06-12 RX ORDER — METOPROLOL TARTRATE 50 MG
5 TABLET ORAL EVERY 6 HOURS
Refills: 0 | Status: DISCONTINUED | OUTPATIENT
Start: 2024-06-12 | End: 2024-06-13

## 2024-06-12 RX ORDER — HYDRALAZINE HYDROCHLORIDE 50 MG/1
10 TABLET ORAL EVERY 6 HOURS
Refills: 0 | Status: DISCONTINUED | OUTPATIENT
Start: 2024-06-12 | End: 2024-06-21

## 2024-06-12 RX ORDER — INSULIN LISPRO 100 [IU]/ML
INJECTION, SOLUTION SUBCUTANEOUS
Refills: 0 | Status: DISCONTINUED | OUTPATIENT
Start: 2024-06-12 | End: 2024-06-21

## 2024-06-12 RX ORDER — DEXTROSE MONOHYDRATE AND SODIUM CHLORIDE 5; .3 G/100ML; G/100ML
1000 INJECTION, SOLUTION INTRAVENOUS
Refills: 0 | Status: DISCONTINUED | OUTPATIENT
Start: 2024-06-12 | End: 2024-06-21

## 2024-06-12 RX ORDER — NITROGLYCERIN 2.5 MG
0.5 CAPSULE, EXTENDED RELEASE ORAL
Refills: 0 | Status: DISCONTINUED | OUTPATIENT
Start: 2024-06-12 | End: 2024-06-14

## 2024-06-12 RX ORDER — DEXTROSE 30 % IN WATER 30 %
25 VIAL (ML) INTRAVENOUS ONCE
Refills: 0 | Status: DISCONTINUED | OUTPATIENT
Start: 2024-06-12 | End: 2024-06-21

## 2024-06-12 RX ORDER — INSULIN LISPRO 100 [IU]/ML
INJECTION, SOLUTION SUBCUTANEOUS AT BEDTIME
Refills: 0 | Status: DISCONTINUED | OUTPATIENT
Start: 2024-06-12 | End: 2024-06-21

## 2024-06-12 RX ORDER — GLUCAGON HYDROCHLORIDE 1 MG/ML
1 INJECTION, POWDER, FOR SOLUTION INTRAMUSCULAR; INTRAVENOUS; SUBCUTANEOUS ONCE
Refills: 0 | Status: DISCONTINUED | OUTPATIENT
Start: 2024-06-12 | End: 2024-06-21

## 2024-06-12 RX ORDER — POTASSIUM CHLORIDE 600 MG/1
10 TABLET, FILM COATED, EXTENDED RELEASE ORAL
Refills: 0 | Status: COMPLETED | OUTPATIENT
Start: 2024-06-12 | End: 2024-06-12

## 2024-06-12 RX ORDER — DEXTROSE 30 % IN WATER 30 %
12.5 VIAL (ML) INTRAVENOUS ONCE
Refills: 0 | Status: DISCONTINUED | OUTPATIENT
Start: 2024-06-12 | End: 2024-06-21

## 2024-06-12 RX ORDER — DEXTROSE MONOHYDRATE 100 MG/ML
125 INJECTION, SOLUTION INTRAVENOUS ONCE
Refills: 0 | Status: DISCONTINUED | OUTPATIENT
Start: 2024-06-12 | End: 2024-06-21

## 2024-06-12 RX ORDER — LABETALOL HYDROCHLORIDE 300 MG/1
10 TABLET ORAL ONCE
Refills: 0 | Status: COMPLETED | OUTPATIENT
Start: 2024-06-12 | End: 2024-06-12

## 2024-06-12 RX ADMIN — Medication 5 MILLIGRAM(S): at 17:14

## 2024-06-12 RX ADMIN — LABETALOL HYDROCHLORIDE 10 MILLIGRAM(S): 300 TABLET ORAL at 02:42

## 2024-06-12 RX ADMIN — Medication 5 MILLIGRAM(S): at 10:14

## 2024-06-12 RX ADMIN — Medication 1000 MILLIGRAM(S): at 21:54

## 2024-06-12 RX ADMIN — POTASSIUM CHLORIDE 100 MILLIEQUIVALENT(S): 600 TABLET, FILM COATED, EXTENDED RELEASE ORAL at 08:02

## 2024-06-12 RX ADMIN — Medication 1.25 MILLIGRAM(S): at 14:42

## 2024-06-12 RX ADMIN — POTASSIUM CHLORIDE 100 MILLIEQUIVALENT(S): 600 TABLET, FILM COATED, EXTENDED RELEASE ORAL at 06:57

## 2024-06-12 RX ADMIN — HYDRALAZINE HYDROCHLORIDE 10 MILLIGRAM(S): 50 TABLET ORAL at 12:42

## 2024-06-12 RX ADMIN — POTASSIUM CHLORIDE 100 MILLIEQUIVALENT(S): 600 TABLET, FILM COATED, EXTENDED RELEASE ORAL at 05:54

## 2024-06-12 RX ADMIN — Medication 0.5 INCH(S): at 21:54

## 2024-06-13 ENCOUNTER — TRANSCRIPTION ENCOUNTER (OUTPATIENT)
Age: 89
End: 2024-06-13

## 2024-06-13 LAB
CULTURE RESULTS: SIGNIFICANT CHANGE UP
GLUCOSE BLDC GLUCOMTR-MCNC: 122 MG/DL — HIGH (ref 70–99)
GLUCOSE BLDC GLUCOMTR-MCNC: 127 MG/DL — HIGH (ref 70–99)
GLUCOSE BLDC GLUCOMTR-MCNC: 140 MG/DL — HIGH (ref 70–99)
GLUCOSE BLDC GLUCOMTR-MCNC: 188 MG/DL — HIGH (ref 70–99)
SPECIMEN SOURCE: SIGNIFICANT CHANGE UP

## 2024-06-13 PROCEDURE — 99232 SBSQ HOSP IP/OBS MODERATE 35: CPT

## 2024-06-13 PROCEDURE — 99233 SBSQ HOSP IP/OBS HIGH 50: CPT

## 2024-06-13 RX ORDER — ENOXAPARIN SODIUM 100 MG/ML
40 INJECTION SUBCUTANEOUS EVERY 24 HOURS
Refills: 0 | Status: DISCONTINUED | OUTPATIENT
Start: 2024-06-13 | End: 2024-06-18

## 2024-06-13 RX ORDER — AMLODIPINE BESYLATE 2.5 MG/1
5 TABLET ORAL DAILY
Refills: 0 | Status: DISCONTINUED | OUTPATIENT
Start: 2024-06-13 | End: 2024-06-16

## 2024-06-13 RX ORDER — METOPROLOL TARTRATE 50 MG
25 TABLET ORAL
Refills: 0 | Status: DISCONTINUED | OUTPATIENT
Start: 2024-06-13 | End: 2024-06-17

## 2024-06-13 RX ADMIN — Medication 1000 MILLIGRAM(S): at 22:13

## 2024-06-13 RX ADMIN — Medication 5 MILLIGRAM(S): at 05:14

## 2024-06-13 RX ADMIN — INSULIN LISPRO 2: 100 INJECTION, SOLUTION SUBCUTANEOUS at 17:51

## 2024-06-13 RX ADMIN — Medication 1 TABLET(S): at 09:33

## 2024-06-13 RX ADMIN — Medication 5 MILLIGRAM(S): at 00:47

## 2024-06-13 RX ADMIN — TAMSULOSIN HYDROCHLORIDE 0.4 MILLIGRAM(S): 0.4 CAPSULE ORAL at 22:13

## 2024-06-13 RX ADMIN — Medication 1000 MICROGRAM(S): at 09:33

## 2024-06-13 RX ADMIN — Medication 25 MILLIGRAM(S): at 22:13

## 2024-06-13 RX ADMIN — Medication 0.5 INCH(S): at 09:34

## 2024-06-13 RX ADMIN — Medication 25 MILLIGRAM(S): at 12:30

## 2024-06-13 RX ADMIN — CLOPIDOGREL BISULFATE 75 MILLIGRAM(S): 75 TABLET, FILM COATED ORAL at 09:34

## 2024-06-13 RX ADMIN — Medication 0.5 INCH(S): at 22:11

## 2024-06-13 RX ADMIN — ENOXAPARIN SODIUM 40 MILLIGRAM(S): 100 INJECTION SUBCUTANEOUS at 22:11

## 2024-06-13 RX ADMIN — Medication 1 TABLET(S): at 09:35

## 2024-06-13 RX ADMIN — ATORVASTATIN CALCIUM 10 MILLIGRAM(S): 20 TABLET, FILM COATED ORAL at 22:13

## 2024-06-13 RX ADMIN — AMLODIPINE BESYLATE 5 MILLIGRAM(S): 2.5 TABLET ORAL at 12:30

## 2024-06-13 RX ADMIN — Medication 3 MILLIGRAM(S): at 22:11

## 2024-06-14 LAB
ANION GAP SERPL CALC-SCNC: 8 MMOL/L — SIGNIFICANT CHANGE UP (ref 5–17)
BUN SERPL-MCNC: 24 MG/DL — HIGH (ref 7–23)
CALCIUM SERPL-MCNC: 8.7 MG/DL — SIGNIFICANT CHANGE UP (ref 8.5–10.1)
CHLORIDE SERPL-SCNC: 110 MMOL/L — HIGH (ref 96–108)
CO2 SERPL-SCNC: 26 MMOL/L — SIGNIFICANT CHANGE UP (ref 22–31)
CREAT SERPL-MCNC: 1.93 MG/DL — HIGH (ref 0.5–1.3)
EGFR: 32 ML/MIN/1.73M2 — LOW
FERRITIN SERPL-MCNC: 179 NG/ML — SIGNIFICANT CHANGE UP (ref 30–400)
FOLATE SERPL-MCNC: 10.8 NG/ML — SIGNIFICANT CHANGE UP
GLUCOSE BLDC GLUCOMTR-MCNC: 111 MG/DL — HIGH (ref 70–99)
GLUCOSE BLDC GLUCOMTR-MCNC: 125 MG/DL — HIGH (ref 70–99)
GLUCOSE BLDC GLUCOMTR-MCNC: 129 MG/DL — HIGH (ref 70–99)
GLUCOSE BLDC GLUCOMTR-MCNC: 141 MG/DL — HIGH (ref 70–99)
GLUCOSE SERPL-MCNC: 122 MG/DL — HIGH (ref 70–99)
HCT VFR BLD CALC: 24.1 % — LOW (ref 39–50)
HGB BLD-MCNC: 7.8 G/DL — LOW (ref 13–17)
IRON SATN MFR SERPL: 10 % — LOW (ref 16–55)
IRON SATN MFR SERPL: 19 UG/DL — LOW (ref 45–165)
MCHC RBC-ENTMCNC: 27.1 PG — SIGNIFICANT CHANGE UP (ref 27–34)
MCHC RBC-ENTMCNC: 32.4 GM/DL — SIGNIFICANT CHANGE UP (ref 32–36)
MCV RBC AUTO: 83.7 FL — SIGNIFICANT CHANGE UP (ref 80–100)
PLATELET # BLD AUTO: 257 K/UL — SIGNIFICANT CHANGE UP (ref 150–400)
POTASSIUM SERPL-MCNC: 2.9 MMOL/L — CRITICAL LOW (ref 3.5–5.3)
POTASSIUM SERPL-SCNC: 2.9 MMOL/L — CRITICAL LOW (ref 3.5–5.3)
RBC # BLD: 2.88 M/UL — LOW (ref 4.2–5.8)
RBC # FLD: 15.2 % — HIGH (ref 10.3–14.5)
SODIUM SERPL-SCNC: 144 MMOL/L — SIGNIFICANT CHANGE UP (ref 135–145)
TIBC SERPL-MCNC: 189 UG/DL — LOW (ref 220–430)
UIBC SERPL-MCNC: 170 UG/DL — SIGNIFICANT CHANGE UP (ref 110–370)
VIT B12 SERPL-MCNC: 1452 PG/ML — HIGH (ref 232–1245)
WBC # BLD: 8.71 K/UL — SIGNIFICANT CHANGE UP (ref 3.8–10.5)
WBC # FLD AUTO: 8.71 K/UL — SIGNIFICANT CHANGE UP (ref 3.8–10.5)

## 2024-06-14 PROCEDURE — 99232 SBSQ HOSP IP/OBS MODERATE 35: CPT

## 2024-06-14 RX ORDER — POLYETHYLENE GLYCOL 3350 1 G/G
17 POWDER ORAL DAILY
Refills: 0 | Status: DISCONTINUED | OUTPATIENT
Start: 2024-06-14 | End: 2024-06-24

## 2024-06-14 RX ORDER — POTASSIUM CHLORIDE 600 MG/1
40 TABLET, FILM COATED, EXTENDED RELEASE ORAL EVERY 4 HOURS
Refills: 0 | Status: DISCONTINUED | OUTPATIENT
Start: 2024-06-14 | End: 2024-06-14

## 2024-06-14 RX ORDER — DONEPEZIL HYDROCHLORIDE 10 MG/1
5 TABLET, FILM COATED ORAL AT BEDTIME
Refills: 0 | Status: DISCONTINUED | OUTPATIENT
Start: 2024-06-14 | End: 2024-06-24

## 2024-06-14 RX ORDER — SODIUM CHLORIDE 0.9 % (FLUSH) 0.9 %
1000 SYRINGE (ML) INJECTION
Refills: 0 | Status: DISCONTINUED | OUTPATIENT
Start: 2024-06-14 | End: 2024-06-17

## 2024-06-14 RX ORDER — MIRTAZAPINE 15 MG/1
7.5 TABLET, FILM COATED ORAL AT BEDTIME
Refills: 0 | Status: DISCONTINUED | OUTPATIENT
Start: 2024-06-14 | End: 2024-06-24

## 2024-06-14 RX ORDER — POTASSIUM CHLORIDE 600 MG/1
10 TABLET, FILM COATED, EXTENDED RELEASE ORAL
Refills: 0 | Status: COMPLETED | OUTPATIENT
Start: 2024-06-14 | End: 2024-06-14

## 2024-06-14 RX ORDER — MEMANTINE HYDROCHLORIDE 7 MG/1
5 CAPSULE, EXTENDED RELEASE ORAL
Refills: 0 | Status: DISCONTINUED | OUTPATIENT
Start: 2024-06-14 | End: 2024-06-24

## 2024-06-14 RX ADMIN — POLYETHYLENE GLYCOL 3350 17 GRAM(S): 1 POWDER ORAL at 17:51

## 2024-06-14 RX ADMIN — Medication 1 TABLET(S): at 09:21

## 2024-06-14 RX ADMIN — MIRTAZAPINE 7.5 MILLIGRAM(S): 15 TABLET, FILM COATED ORAL at 21:08

## 2024-06-14 RX ADMIN — Medication 1000 MILLIGRAM(S): at 21:07

## 2024-06-14 RX ADMIN — Medication 25 MILLIGRAM(S): at 21:08

## 2024-06-14 RX ADMIN — POTASSIUM CHLORIDE 40 MILLIEQUIVALENT(S): 600 TABLET, FILM COATED, EXTENDED RELEASE ORAL at 11:09

## 2024-06-14 RX ADMIN — TAMSULOSIN HYDROCHLORIDE 0.4 MILLIGRAM(S): 0.4 CAPSULE ORAL at 21:07

## 2024-06-14 RX ADMIN — AMLODIPINE BESYLATE 5 MILLIGRAM(S): 2.5 TABLET ORAL at 09:21

## 2024-06-14 RX ADMIN — MEMANTINE HYDROCHLORIDE 5 MILLIGRAM(S): 7 CAPSULE, EXTENDED RELEASE ORAL at 21:09

## 2024-06-14 RX ADMIN — CLOPIDOGREL BISULFATE 75 MILLIGRAM(S): 75 TABLET, FILM COATED ORAL at 09:21

## 2024-06-14 RX ADMIN — Medication 50 MILLILITER(S): at 11:08

## 2024-06-14 RX ADMIN — Medication 3 MILLIGRAM(S): at 21:07

## 2024-06-14 RX ADMIN — POTASSIUM CHLORIDE 100 MILLIEQUIVALENT(S): 600 TABLET, FILM COATED, EXTENDED RELEASE ORAL at 19:02

## 2024-06-14 RX ADMIN — DONEPEZIL HYDROCHLORIDE 5 MILLIGRAM(S): 10 TABLET, FILM COATED ORAL at 21:08

## 2024-06-14 RX ADMIN — ENOXAPARIN SODIUM 40 MILLIGRAM(S): 100 INJECTION SUBCUTANEOUS at 21:19

## 2024-06-14 RX ADMIN — POTASSIUM CHLORIDE 100 MILLIEQUIVALENT(S): 600 TABLET, FILM COATED, EXTENDED RELEASE ORAL at 17:49

## 2024-06-14 RX ADMIN — ATORVASTATIN CALCIUM 10 MILLIGRAM(S): 20 TABLET, FILM COATED ORAL at 21:07

## 2024-06-14 RX ADMIN — Medication 1000 MICROGRAM(S): at 09:23

## 2024-06-14 RX ADMIN — POTASSIUM CHLORIDE 100 MILLIEQUIVALENT(S): 600 TABLET, FILM COATED, EXTENDED RELEASE ORAL at 16:56

## 2024-06-14 RX ADMIN — Medication 1 TABLET(S): at 11:11

## 2024-06-14 RX ADMIN — Medication 25 MILLIGRAM(S): at 09:21

## 2024-06-15 LAB
ANION GAP SERPL CALC-SCNC: 13 MMOL/L — SIGNIFICANT CHANGE UP (ref 5–17)
BUN SERPL-MCNC: 24 MG/DL — HIGH (ref 7–23)
CALCIUM SERPL-MCNC: 9 MG/DL — SIGNIFICANT CHANGE UP (ref 8.5–10.1)
CHLORIDE SERPL-SCNC: 113 MMOL/L — HIGH (ref 96–108)
CO2 SERPL-SCNC: 22 MMOL/L — SIGNIFICANT CHANGE UP (ref 22–31)
CREAT SERPL-MCNC: 1.73 MG/DL — HIGH (ref 0.5–1.3)
EGFR: 37 ML/MIN/1.73M2 — LOW
GLUCOSE BLDC GLUCOMTR-MCNC: 112 MG/DL — HIGH (ref 70–99)
GLUCOSE BLDC GLUCOMTR-MCNC: 118 MG/DL — HIGH (ref 70–99)
GLUCOSE BLDC GLUCOMTR-MCNC: 123 MG/DL — HIGH (ref 70–99)
GLUCOSE BLDC GLUCOMTR-MCNC: 133 MG/DL — HIGH (ref 70–99)
GLUCOSE SERPL-MCNC: 134 MG/DL — HIGH (ref 70–99)
HCT VFR BLD CALC: 25.3 % — LOW (ref 39–50)
HGB BLD-MCNC: 8.1 G/DL — LOW (ref 13–17)
MCHC RBC-ENTMCNC: 26.7 PG — LOW (ref 27–34)
MCHC RBC-ENTMCNC: 32 GM/DL — SIGNIFICANT CHANGE UP (ref 32–36)
MCV RBC AUTO: 83.5 FL — SIGNIFICANT CHANGE UP (ref 80–100)
PLATELET # BLD AUTO: 258 K/UL — SIGNIFICANT CHANGE UP (ref 150–400)
POTASSIUM SERPL-MCNC: 3 MMOL/L — LOW (ref 3.5–5.3)
POTASSIUM SERPL-SCNC: 3 MMOL/L — LOW (ref 3.5–5.3)
RBC # BLD: 3.03 M/UL — LOW (ref 4.2–5.8)
RBC # FLD: 15.4 % — HIGH (ref 10.3–14.5)
SODIUM SERPL-SCNC: 148 MMOL/L — HIGH (ref 135–145)
WBC # BLD: 10.21 K/UL — SIGNIFICANT CHANGE UP (ref 3.8–10.5)
WBC # FLD AUTO: 10.21 K/UL — SIGNIFICANT CHANGE UP (ref 3.8–10.5)

## 2024-06-15 PROCEDURE — 99232 SBSQ HOSP IP/OBS MODERATE 35: CPT

## 2024-06-15 RX ADMIN — CLOPIDOGREL BISULFATE 75 MILLIGRAM(S): 75 TABLET, FILM COATED ORAL at 10:17

## 2024-06-15 RX ADMIN — Medication 1000 MICROGRAM(S): at 10:18

## 2024-06-15 RX ADMIN — POLYETHYLENE GLYCOL 3350 17 GRAM(S): 1 POWDER ORAL at 16:13

## 2024-06-15 RX ADMIN — MEMANTINE HYDROCHLORIDE 5 MILLIGRAM(S): 7 CAPSULE, EXTENDED RELEASE ORAL at 10:18

## 2024-06-15 RX ADMIN — HYDRALAZINE HYDROCHLORIDE 10 MILLIGRAM(S): 50 TABLET ORAL at 21:31

## 2024-06-15 RX ADMIN — AMLODIPINE BESYLATE 5 MILLIGRAM(S): 2.5 TABLET ORAL at 10:17

## 2024-06-15 RX ADMIN — Medication 25 MILLIGRAM(S): at 10:17

## 2024-06-15 RX ADMIN — Medication 1 TABLET(S): at 12:37

## 2024-06-15 RX ADMIN — ENOXAPARIN SODIUM 40 MILLIGRAM(S): 100 INJECTION SUBCUTANEOUS at 21:31

## 2024-06-15 RX ADMIN — Medication 1 TABLET(S): at 10:17

## 2024-06-16 LAB
ANION GAP SERPL CALC-SCNC: 12 MMOL/L — SIGNIFICANT CHANGE UP (ref 5–17)
BUN SERPL-MCNC: 27 MG/DL — HIGH (ref 7–23)
CALCIUM SERPL-MCNC: 8.8 MG/DL — SIGNIFICANT CHANGE UP (ref 8.5–10.1)
CHLORIDE SERPL-SCNC: 113 MMOL/L — HIGH (ref 96–108)
CO2 SERPL-SCNC: 21 MMOL/L — LOW (ref 22–31)
CREAT SERPL-MCNC: 1.48 MG/DL — HIGH (ref 0.5–1.3)
CULTURE RESULTS: SIGNIFICANT CHANGE UP
CULTURE RESULTS: SIGNIFICANT CHANGE UP
EGFR: 44 ML/MIN/1.73M2 — LOW
GLUCOSE BLDC GLUCOMTR-MCNC: 124 MG/DL — HIGH (ref 70–99)
GLUCOSE BLDC GLUCOMTR-MCNC: 129 MG/DL — HIGH (ref 70–99)
GLUCOSE BLDC GLUCOMTR-MCNC: 133 MG/DL — HIGH (ref 70–99)
GLUCOSE BLDC GLUCOMTR-MCNC: 200 MG/DL — HIGH (ref 70–99)
GLUCOSE SERPL-MCNC: 196 MG/DL — HIGH (ref 70–99)
HCT VFR BLD CALC: 27 % — LOW (ref 39–50)
HGB BLD-MCNC: 8.7 G/DL — LOW (ref 13–17)
MCHC RBC-ENTMCNC: 26.9 PG — LOW (ref 27–34)
MCHC RBC-ENTMCNC: 32.2 GM/DL — SIGNIFICANT CHANGE UP (ref 32–36)
MCV RBC AUTO: 83.3 FL — SIGNIFICANT CHANGE UP (ref 80–100)
PLATELET # BLD AUTO: 318 K/UL — SIGNIFICANT CHANGE UP (ref 150–400)
POTASSIUM SERPL-MCNC: 3 MMOL/L — LOW (ref 3.5–5.3)
POTASSIUM SERPL-SCNC: 3 MMOL/L — LOW (ref 3.5–5.3)
RBC # BLD: 3.24 M/UL — LOW (ref 4.2–5.8)
RBC # FLD: 15.3 % — HIGH (ref 10.3–14.5)
SODIUM SERPL-SCNC: 146 MMOL/L — HIGH (ref 135–145)
SPECIMEN SOURCE: SIGNIFICANT CHANGE UP
SPECIMEN SOURCE: SIGNIFICANT CHANGE UP
WBC # BLD: 9.62 K/UL — SIGNIFICANT CHANGE UP (ref 3.8–10.5)
WBC # FLD AUTO: 9.62 K/UL — SIGNIFICANT CHANGE UP (ref 3.8–10.5)

## 2024-06-16 PROCEDURE — 99232 SBSQ HOSP IP/OBS MODERATE 35: CPT

## 2024-06-16 RX ORDER — POTASSIUM CHLORIDE 600 MG/1
10 TABLET, FILM COATED, EXTENDED RELEASE ORAL
Refills: 0 | Status: COMPLETED | OUTPATIENT
Start: 2024-06-16 | End: 2024-06-16

## 2024-06-16 RX ORDER — POTASSIUM CHLORIDE 600 MG/1
40 TABLET, FILM COATED, EXTENDED RELEASE ORAL EVERY 4 HOURS
Refills: 0 | Status: DISCONTINUED | OUTPATIENT
Start: 2024-06-16 | End: 2024-06-16

## 2024-06-16 RX ORDER — AMLODIPINE BESYLATE 2.5 MG/1
10 TABLET ORAL DAILY
Refills: 0 | Status: DISCONTINUED | OUTPATIENT
Start: 2024-06-16 | End: 2024-06-24

## 2024-06-16 RX ADMIN — Medication 1000 MICROGRAM(S): at 10:01

## 2024-06-16 RX ADMIN — AMLODIPINE BESYLATE 10 MILLIGRAM(S): 2.5 TABLET ORAL at 10:01

## 2024-06-16 RX ADMIN — MIRTAZAPINE 7.5 MILLIGRAM(S): 15 TABLET, FILM COATED ORAL at 21:17

## 2024-06-16 RX ADMIN — Medication 25 MILLIGRAM(S): at 21:15

## 2024-06-16 RX ADMIN — POTASSIUM CHLORIDE 100 MILLIEQUIVALENT(S): 600 TABLET, FILM COATED, EXTENDED RELEASE ORAL at 18:14

## 2024-06-16 RX ADMIN — ENOXAPARIN SODIUM 40 MILLIGRAM(S): 100 INJECTION SUBCUTANEOUS at 21:15

## 2024-06-16 RX ADMIN — MEMANTINE HYDROCHLORIDE 5 MILLIGRAM(S): 7 CAPSULE, EXTENDED RELEASE ORAL at 10:02

## 2024-06-16 RX ADMIN — MEMANTINE HYDROCHLORIDE 5 MILLIGRAM(S): 7 CAPSULE, EXTENDED RELEASE ORAL at 21:17

## 2024-06-16 RX ADMIN — Medication 3 MILLIGRAM(S): at 21:15

## 2024-06-16 RX ADMIN — TAMSULOSIN HYDROCHLORIDE 0.4 MILLIGRAM(S): 0.4 CAPSULE ORAL at 21:17

## 2024-06-16 RX ADMIN — Medication 25 MILLIGRAM(S): at 10:01

## 2024-06-16 RX ADMIN — Medication 1 TABLET(S): at 10:01

## 2024-06-16 RX ADMIN — POTASSIUM CHLORIDE 100 MILLIEQUIVALENT(S): 600 TABLET, FILM COATED, EXTENDED RELEASE ORAL at 15:54

## 2024-06-16 RX ADMIN — POTASSIUM CHLORIDE 100 MILLIEQUIVALENT(S): 600 TABLET, FILM COATED, EXTENDED RELEASE ORAL at 17:33

## 2024-06-16 RX ADMIN — INSULIN LISPRO 2: 100 INJECTION, SOLUTION SUBCUTANEOUS at 08:48

## 2024-06-16 RX ADMIN — ATORVASTATIN CALCIUM 10 MILLIGRAM(S): 20 TABLET, FILM COATED ORAL at 21:17

## 2024-06-16 RX ADMIN — POLYETHYLENE GLYCOL 3350 17 GRAM(S): 1 POWDER ORAL at 15:54

## 2024-06-16 RX ADMIN — CLOPIDOGREL BISULFATE 75 MILLIGRAM(S): 75 TABLET, FILM COATED ORAL at 10:01

## 2024-06-17 LAB
ADD ON TEST-SPECIMEN IN LAB: SIGNIFICANT CHANGE UP
ANION GAP SERPL CALC-SCNC: 8 MMOL/L — SIGNIFICANT CHANGE UP (ref 5–17)
BUN SERPL-MCNC: 25 MG/DL — HIGH (ref 7–23)
CALCIUM SERPL-MCNC: 9.2 MG/DL — SIGNIFICANT CHANGE UP (ref 8.5–10.1)
CHLORIDE SERPL-SCNC: 114 MMOL/L — HIGH (ref 96–108)
CO2 SERPL-SCNC: 22 MMOL/L — SIGNIFICANT CHANGE UP (ref 22–31)
CREAT SERPL-MCNC: 1.65 MG/DL — HIGH (ref 0.5–1.3)
EGFR: 39 ML/MIN/1.73M2 — LOW
GLUCOSE BLDC GLUCOMTR-MCNC: 111 MG/DL — HIGH (ref 70–99)
GLUCOSE BLDC GLUCOMTR-MCNC: 124 MG/DL — HIGH (ref 70–99)
GLUCOSE BLDC GLUCOMTR-MCNC: 128 MG/DL — HIGH (ref 70–99)
GLUCOSE BLDC GLUCOMTR-MCNC: 149 MG/DL — HIGH (ref 70–99)
GLUCOSE SERPL-MCNC: 126 MG/DL — HIGH (ref 70–99)
HCT VFR BLD CALC: 28.6 % — LOW (ref 39–50)
HGB BLD-MCNC: 9.1 G/DL — LOW (ref 13–17)
MCHC RBC-ENTMCNC: 26.2 PG — LOW (ref 27–34)
MCHC RBC-ENTMCNC: 31.8 GM/DL — LOW (ref 32–36)
MCV RBC AUTO: 82.4 FL — SIGNIFICANT CHANGE UP (ref 80–100)
PLATELET # BLD AUTO: 316 K/UL — SIGNIFICANT CHANGE UP (ref 150–400)
POTASSIUM SERPL-MCNC: 3.3 MMOL/L — LOW (ref 3.5–5.3)
POTASSIUM SERPL-SCNC: 3.3 MMOL/L — LOW (ref 3.5–5.3)
RBC # BLD: 3.47 M/UL — LOW (ref 4.2–5.8)
RBC # FLD: 15.6 % — HIGH (ref 10.3–14.5)
SODIUM SERPL-SCNC: 144 MMOL/L — SIGNIFICANT CHANGE UP (ref 135–145)
WBC # BLD: 9.76 K/UL — SIGNIFICANT CHANGE UP (ref 3.8–10.5)
WBC # FLD AUTO: 9.76 K/UL — SIGNIFICANT CHANGE UP (ref 3.8–10.5)

## 2024-06-17 PROCEDURE — 99497 ADVNCD CARE PLAN 30 MIN: CPT

## 2024-06-17 PROCEDURE — 99233 SBSQ HOSP IP/OBS HIGH 50: CPT

## 2024-06-17 PROCEDURE — 99498 ADVNCD CARE PLAN ADDL 30 MIN: CPT

## 2024-06-17 RX ORDER — METOPROLOL TARTRATE 50 MG
100 TABLET ORAL DAILY
Refills: 0 | Status: DISCONTINUED | OUTPATIENT
Start: 2024-06-17 | End: 2024-06-24

## 2024-06-17 RX ORDER — POTASSIUM CHLORIDE 600 MG/1
40 TABLET, FILM COATED, EXTENDED RELEASE ORAL ONCE
Refills: 0 | Status: COMPLETED | OUTPATIENT
Start: 2024-06-17 | End: 2024-06-17

## 2024-06-17 RX ORDER — POTASSIUM CHLORIDE 600 MG/1
10 TABLET, FILM COATED, EXTENDED RELEASE ORAL
Refills: 0 | Status: COMPLETED | OUTPATIENT
Start: 2024-06-17 | End: 2024-06-17

## 2024-06-17 RX ADMIN — POTASSIUM CHLORIDE 100 MILLIEQUIVALENT(S): 600 TABLET, FILM COATED, EXTENDED RELEASE ORAL at 12:26

## 2024-06-17 RX ADMIN — Medication 1 TABLET(S): at 09:27

## 2024-06-17 RX ADMIN — AMLODIPINE BESYLATE 10 MILLIGRAM(S): 2.5 TABLET ORAL at 09:27

## 2024-06-17 RX ADMIN — Medication 1000 MICROGRAM(S): at 09:28

## 2024-06-17 RX ADMIN — Medication 25 MILLIGRAM(S): at 09:28

## 2024-06-17 RX ADMIN — POTASSIUM CHLORIDE 40 MILLIEQUIVALENT(S): 600 TABLET, FILM COATED, EXTENDED RELEASE ORAL at 12:26

## 2024-06-17 RX ADMIN — POTASSIUM CHLORIDE 100 MILLIEQUIVALENT(S): 600 TABLET, FILM COATED, EXTENDED RELEASE ORAL at 17:46

## 2024-06-17 RX ADMIN — POLYETHYLENE GLYCOL 3350 17 GRAM(S): 1 POWDER ORAL at 09:28

## 2024-06-17 RX ADMIN — MEMANTINE HYDROCHLORIDE 5 MILLIGRAM(S): 7 CAPSULE, EXTENDED RELEASE ORAL at 09:27

## 2024-06-17 RX ADMIN — Medication 1 TABLET(S): at 09:34

## 2024-06-17 RX ADMIN — CLOPIDOGREL BISULFATE 75 MILLIGRAM(S): 75 TABLET, FILM COATED ORAL at 09:28

## 2024-06-17 RX ADMIN — ENOXAPARIN SODIUM 40 MILLIGRAM(S): 100 INJECTION SUBCUTANEOUS at 22:41

## 2024-06-17 RX ADMIN — Medication 100 MILLIGRAM(S): at 13:32

## 2024-06-17 RX ADMIN — HYDRALAZINE HYDROCHLORIDE 10 MILLIGRAM(S): 50 TABLET ORAL at 21:22

## 2024-06-17 RX ADMIN — POTASSIUM CHLORIDE 100 MILLIEQUIVALENT(S): 600 TABLET, FILM COATED, EXTENDED RELEASE ORAL at 13:27

## 2024-06-18 LAB
ANION GAP SERPL CALC-SCNC: 9 MMOL/L — SIGNIFICANT CHANGE UP (ref 5–17)
BUN SERPL-MCNC: 22 MG/DL — SIGNIFICANT CHANGE UP (ref 7–23)
CALCIUM SERPL-MCNC: 9.6 MG/DL — SIGNIFICANT CHANGE UP (ref 8.5–10.1)
CHLORIDE SERPL-SCNC: 119 MMOL/L — HIGH (ref 96–108)
CO2 SERPL-SCNC: 23 MMOL/L — SIGNIFICANT CHANGE UP (ref 22–31)
CREAT SERPL-MCNC: 1.29 MG/DL — SIGNIFICANT CHANGE UP (ref 0.5–1.3)
EGFR: 52 ML/MIN/1.73M2 — LOW
GLUCOSE BLDC GLUCOMTR-MCNC: 112 MG/DL — HIGH (ref 70–99)
GLUCOSE BLDC GLUCOMTR-MCNC: 124 MG/DL — HIGH (ref 70–99)
GLUCOSE BLDC GLUCOMTR-MCNC: 136 MG/DL — HIGH (ref 70–99)
GLUCOSE BLDC GLUCOMTR-MCNC: 181 MG/DL — HIGH (ref 70–99)
GLUCOSE SERPL-MCNC: 122 MG/DL — HIGH (ref 70–99)
HCT VFR BLD CALC: 26.1 % — LOW (ref 39–50)
HGB BLD-MCNC: 8.3 G/DL — LOW (ref 13–17)
MAGNESIUM SERPL-MCNC: 2.1 MG/DL — SIGNIFICANT CHANGE UP (ref 1.6–2.6)
MCHC RBC-ENTMCNC: 26.7 PG — LOW (ref 27–34)
MCHC RBC-ENTMCNC: 31.8 GM/DL — LOW (ref 32–36)
MCV RBC AUTO: 83.9 FL — SIGNIFICANT CHANGE UP (ref 80–100)
PLATELET # BLD AUTO: 307 K/UL — SIGNIFICANT CHANGE UP (ref 150–400)
POTASSIUM SERPL-MCNC: 4.1 MMOL/L — SIGNIFICANT CHANGE UP (ref 3.5–5.3)
POTASSIUM SERPL-SCNC: 4.1 MMOL/L — SIGNIFICANT CHANGE UP (ref 3.5–5.3)
RBC # BLD: 3.11 M/UL — LOW (ref 4.2–5.8)
RBC # FLD: 15.8 % — HIGH (ref 10.3–14.5)
SODIUM SERPL-SCNC: 151 MMOL/L — HIGH (ref 135–145)
WBC # BLD: 7.52 K/UL — SIGNIFICANT CHANGE UP (ref 3.8–10.5)
WBC # FLD AUTO: 7.52 K/UL — SIGNIFICANT CHANGE UP (ref 3.8–10.5)

## 2024-06-18 PROCEDURE — 99233 SBSQ HOSP IP/OBS HIGH 50: CPT

## 2024-06-18 PROCEDURE — 76770 US EXAM ABDO BACK WALL COMP: CPT | Mod: 26

## 2024-06-18 PROCEDURE — 99221 1ST HOSP IP/OBS SF/LOW 40: CPT

## 2024-06-18 RX ORDER — LORAZEPAM 0.5 MG
0.25 TABLET ORAL ONCE
Refills: 0 | Status: DISCONTINUED | OUTPATIENT
Start: 2024-06-18 | End: 2024-06-18

## 2024-06-18 RX ORDER — DEXTROSE MONOHYDRATE AND SODIUM CHLORIDE 5; .3 G/100ML; G/100ML
1000 INJECTION, SOLUTION INTRAVENOUS
Refills: 0 | Status: DISCONTINUED | OUTPATIENT
Start: 2024-06-18 | End: 2024-06-19

## 2024-06-18 RX ADMIN — Medication 0.25 MILLIGRAM(S): at 11:05

## 2024-06-18 RX ADMIN — DONEPEZIL HYDROCHLORIDE 5 MILLIGRAM(S): 10 TABLET, FILM COATED ORAL at 21:18

## 2024-06-18 RX ADMIN — MIRTAZAPINE 7.5 MILLIGRAM(S): 15 TABLET, FILM COATED ORAL at 21:18

## 2024-06-18 RX ADMIN — Medication 3 MILLIGRAM(S): at 21:19

## 2024-06-18 RX ADMIN — ATORVASTATIN CALCIUM 10 MILLIGRAM(S): 20 TABLET, FILM COATED ORAL at 21:18

## 2024-06-18 RX ADMIN — TAMSULOSIN HYDROCHLORIDE 0.4 MILLIGRAM(S): 0.4 CAPSULE ORAL at 21:18

## 2024-06-18 RX ADMIN — AMLODIPINE BESYLATE 10 MILLIGRAM(S): 2.5 TABLET ORAL at 10:39

## 2024-06-18 RX ADMIN — HYDRALAZINE HYDROCHLORIDE 10 MILLIGRAM(S): 50 TABLET ORAL at 15:59

## 2024-06-18 RX ADMIN — MEMANTINE HYDROCHLORIDE 5 MILLIGRAM(S): 7 CAPSULE, EXTENDED RELEASE ORAL at 21:18

## 2024-06-18 RX ADMIN — DEXTROSE MONOHYDRATE AND SODIUM CHLORIDE 75 MILLILITER(S): 5; .3 INJECTION, SOLUTION INTRAVENOUS at 11:05

## 2024-06-19 LAB
ANION GAP SERPL CALC-SCNC: 8 MMOL/L — SIGNIFICANT CHANGE UP (ref 5–17)
BLD GP AB SCN SERPL QL: SIGNIFICANT CHANGE UP
BUN SERPL-MCNC: 19 MG/DL — SIGNIFICANT CHANGE UP (ref 7–23)
CALCIUM SERPL-MCNC: 9.1 MG/DL — SIGNIFICANT CHANGE UP (ref 8.5–10.1)
CHLORIDE SERPL-SCNC: 117 MMOL/L — HIGH (ref 96–108)
CO2 SERPL-SCNC: 26 MMOL/L — SIGNIFICANT CHANGE UP (ref 22–31)
CREAT SERPL-MCNC: 1.29 MG/DL — SIGNIFICANT CHANGE UP (ref 0.5–1.3)
EGFR: 52 ML/MIN/1.73M2 — LOW
GLUCOSE BLDC GLUCOMTR-MCNC: 127 MG/DL — HIGH (ref 70–99)
GLUCOSE BLDC GLUCOMTR-MCNC: 149 MG/DL — HIGH (ref 70–99)
GLUCOSE BLDC GLUCOMTR-MCNC: 157 MG/DL — HIGH (ref 70–99)
GLUCOSE SERPL-MCNC: 150 MG/DL — HIGH (ref 70–99)
HCT VFR BLD CALC: 23.8 % — LOW (ref 39–50)
HGB BLD-MCNC: 7.5 G/DL — LOW (ref 13–17)
MCHC RBC-ENTMCNC: 26.3 PG — LOW (ref 27–34)
MCHC RBC-ENTMCNC: 31.5 GM/DL — LOW (ref 32–36)
MCV RBC AUTO: 83.5 FL — SIGNIFICANT CHANGE UP (ref 80–100)
PLATELET # BLD AUTO: 294 K/UL — SIGNIFICANT CHANGE UP (ref 150–400)
POTASSIUM SERPL-MCNC: 3.5 MMOL/L — SIGNIFICANT CHANGE UP (ref 3.5–5.3)
POTASSIUM SERPL-SCNC: 3.5 MMOL/L — SIGNIFICANT CHANGE UP (ref 3.5–5.3)
RBC # BLD: 2.85 M/UL — LOW (ref 4.2–5.8)
RBC # FLD: 15.8 % — HIGH (ref 10.3–14.5)
SODIUM SERPL-SCNC: 151 MMOL/L — HIGH (ref 135–145)
WBC # BLD: 9.36 K/UL — SIGNIFICANT CHANGE UP (ref 3.8–10.5)
WBC # FLD AUTO: 9.36 K/UL — SIGNIFICANT CHANGE UP (ref 3.8–10.5)

## 2024-06-19 PROCEDURE — 99233 SBSQ HOSP IP/OBS HIGH 50: CPT

## 2024-06-19 RX ORDER — DEXTROSE MONOHYDRATE AND SODIUM CHLORIDE 5; .3 G/100ML; G/100ML
1000 INJECTION, SOLUTION INTRAVENOUS
Refills: 0 | Status: DISCONTINUED | OUTPATIENT
Start: 2024-06-19 | End: 2024-06-20

## 2024-06-19 RX ADMIN — ATORVASTATIN CALCIUM 10 MILLIGRAM(S): 20 TABLET, FILM COATED ORAL at 20:33

## 2024-06-19 RX ADMIN — POLYETHYLENE GLYCOL 3350 17 GRAM(S): 1 POWDER ORAL at 12:15

## 2024-06-19 RX ADMIN — Medication 100 MILLIGRAM(S): at 12:15

## 2024-06-19 RX ADMIN — Medication 1 TABLET(S): at 12:15

## 2024-06-19 RX ADMIN — Medication 1000 MICROGRAM(S): at 12:15

## 2024-06-19 RX ADMIN — DONEPEZIL HYDROCHLORIDE 5 MILLIGRAM(S): 10 TABLET, FILM COATED ORAL at 20:33

## 2024-06-19 RX ADMIN — MEMANTINE HYDROCHLORIDE 5 MILLIGRAM(S): 7 CAPSULE, EXTENDED RELEASE ORAL at 12:16

## 2024-06-19 RX ADMIN — Medication 3 MILLIGRAM(S): at 20:33

## 2024-06-19 RX ADMIN — MEMANTINE HYDROCHLORIDE 5 MILLIGRAM(S): 7 CAPSULE, EXTENDED RELEASE ORAL at 20:33

## 2024-06-19 RX ADMIN — TAMSULOSIN HYDROCHLORIDE 0.4 MILLIGRAM(S): 0.4 CAPSULE ORAL at 20:32

## 2024-06-19 RX ADMIN — AMLODIPINE BESYLATE 10 MILLIGRAM(S): 2.5 TABLET ORAL at 12:15

## 2024-06-19 RX ADMIN — CLOPIDOGREL BISULFATE 75 MILLIGRAM(S): 75 TABLET, FILM COATED ORAL at 12:15

## 2024-06-19 RX ADMIN — MIRTAZAPINE 7.5 MILLIGRAM(S): 15 TABLET, FILM COATED ORAL at 20:31

## 2024-06-20 LAB
ANION GAP SERPL CALC-SCNC: 4 MMOL/L — LOW (ref 5–17)
BUN SERPL-MCNC: 20 MG/DL — SIGNIFICANT CHANGE UP (ref 7–23)
CALCIUM SERPL-MCNC: 8.6 MG/DL — SIGNIFICANT CHANGE UP (ref 8.5–10.1)
CHLORIDE SERPL-SCNC: 115 MMOL/L — HIGH (ref 96–108)
CO2 SERPL-SCNC: 29 MMOL/L — SIGNIFICANT CHANGE UP (ref 22–31)
CREAT SERPL-MCNC: 1.56 MG/DL — HIGH (ref 0.5–1.3)
EGFR: 41 ML/MIN/1.73M2 — LOW
GLUCOSE BLDC GLUCOMTR-MCNC: 102 MG/DL — HIGH (ref 70–99)
GLUCOSE BLDC GLUCOMTR-MCNC: 144 MG/DL — HIGH (ref 70–99)
GLUCOSE BLDC GLUCOMTR-MCNC: 167 MG/DL — HIGH (ref 70–99)
GLUCOSE BLDC GLUCOMTR-MCNC: 173 MG/DL — HIGH (ref 70–99)
GLUCOSE SERPL-MCNC: 159 MG/DL — HIGH (ref 70–99)
HCT VFR BLD CALC: 23.4 % — LOW (ref 39–50)
HGB BLD-MCNC: 7.4 G/DL — LOW (ref 13–17)
MCHC RBC-ENTMCNC: 26.6 PG — LOW (ref 27–34)
MCHC RBC-ENTMCNC: 31.6 GM/DL — LOW (ref 32–36)
MCV RBC AUTO: 84.2 FL — SIGNIFICANT CHANGE UP (ref 80–100)
PLATELET # BLD AUTO: 281 K/UL — SIGNIFICANT CHANGE UP (ref 150–400)
POTASSIUM SERPL-MCNC: 2.8 MMOL/L — CRITICAL LOW (ref 3.5–5.3)
POTASSIUM SERPL-SCNC: 2.8 MMOL/L — CRITICAL LOW (ref 3.5–5.3)
RBC # BLD: 2.78 M/UL — LOW (ref 4.2–5.8)
RBC # FLD: 15.9 % — HIGH (ref 10.3–14.5)
SODIUM SERPL-SCNC: 148 MMOL/L — HIGH (ref 135–145)
WBC # BLD: 8.99 K/UL — SIGNIFICANT CHANGE UP (ref 3.8–10.5)
WBC # FLD AUTO: 8.99 K/UL — SIGNIFICANT CHANGE UP (ref 3.8–10.5)

## 2024-06-20 PROCEDURE — 99233 SBSQ HOSP IP/OBS HIGH 50: CPT

## 2024-06-20 PROCEDURE — 99232 SBSQ HOSP IP/OBS MODERATE 35: CPT

## 2024-06-20 RX ORDER — POTASSIUM CHLORIDE 600 MG/1
40 TABLET, FILM COATED, EXTENDED RELEASE ORAL EVERY 4 HOURS
Refills: 0 | Status: DISCONTINUED | OUTPATIENT
Start: 2024-06-20 | End: 2024-06-20

## 2024-06-20 RX ORDER — POTASSIUM CHLORIDE 600 MG/1
10 TABLET, FILM COATED, EXTENDED RELEASE ORAL
Refills: 0 | Status: COMPLETED | OUTPATIENT
Start: 2024-06-20 | End: 2024-06-20

## 2024-06-20 RX ORDER — POTASSIUM CHLORIDE 600 MG/1
40 TABLET, FILM COATED, EXTENDED RELEASE ORAL EVERY 4 HOURS
Refills: 0 | Status: COMPLETED | OUTPATIENT
Start: 2024-06-20 | End: 2024-06-20

## 2024-06-20 RX ORDER — DEXTROSE MONOHYDRATE AND SODIUM CHLORIDE 5; .3 G/100ML; G/100ML
1000 INJECTION, SOLUTION INTRAVENOUS
Refills: 0 | Status: DISCONTINUED | OUTPATIENT
Start: 2024-06-20 | End: 2024-06-21

## 2024-06-20 RX ADMIN — Medication 1 TABLET(S): at 09:24

## 2024-06-20 RX ADMIN — TAMSULOSIN HYDROCHLORIDE 0.4 MILLIGRAM(S): 0.4 CAPSULE ORAL at 21:04

## 2024-06-20 RX ADMIN — POLYETHYLENE GLYCOL 3350 17 GRAM(S): 1 POWDER ORAL at 09:25

## 2024-06-20 RX ADMIN — POTASSIUM CHLORIDE 100 MILLIEQUIVALENT(S): 600 TABLET, FILM COATED, EXTENDED RELEASE ORAL at 12:31

## 2024-06-20 RX ADMIN — DEXTROSE MONOHYDRATE AND SODIUM CHLORIDE 75 MILLILITER(S): 5; .3 INJECTION, SOLUTION INTRAVENOUS at 00:00

## 2024-06-20 RX ADMIN — CLOPIDOGREL BISULFATE 75 MILLIGRAM(S): 75 TABLET, FILM COATED ORAL at 09:24

## 2024-06-20 RX ADMIN — Medication 100 MILLIGRAM(S): at 09:24

## 2024-06-20 RX ADMIN — DONEPEZIL HYDROCHLORIDE 5 MILLIGRAM(S): 10 TABLET, FILM COATED ORAL at 21:04

## 2024-06-20 RX ADMIN — ATORVASTATIN CALCIUM 10 MILLIGRAM(S): 20 TABLET, FILM COATED ORAL at 21:04

## 2024-06-20 RX ADMIN — POTASSIUM CHLORIDE 100 MILLIEQUIVALENT(S): 600 TABLET, FILM COATED, EXTENDED RELEASE ORAL at 11:14

## 2024-06-20 RX ADMIN — POTASSIUM CHLORIDE 40 MILLIEQUIVALENT(S): 600 TABLET, FILM COATED, EXTENDED RELEASE ORAL at 13:51

## 2024-06-20 RX ADMIN — MEMANTINE HYDROCHLORIDE 5 MILLIGRAM(S): 7 CAPSULE, EXTENDED RELEASE ORAL at 21:04

## 2024-06-20 RX ADMIN — Medication 3 MILLIGRAM(S): at 21:04

## 2024-06-20 RX ADMIN — INSULIN LISPRO 2: 100 INJECTION, SOLUTION SUBCUTANEOUS at 08:23

## 2024-06-20 RX ADMIN — AMLODIPINE BESYLATE 10 MILLIGRAM(S): 2.5 TABLET ORAL at 09:24

## 2024-06-20 RX ADMIN — Medication 1000 MICROGRAM(S): at 09:24

## 2024-06-20 RX ADMIN — MEMANTINE HYDROCHLORIDE 5 MILLIGRAM(S): 7 CAPSULE, EXTENDED RELEASE ORAL at 09:23

## 2024-06-20 RX ADMIN — MIRTAZAPINE 7.5 MILLIGRAM(S): 15 TABLET, FILM COATED ORAL at 21:04

## 2024-06-20 RX ADMIN — INSULIN LISPRO 2: 100 INJECTION, SOLUTION SUBCUTANEOUS at 17:08

## 2024-06-21 LAB
ANION GAP SERPL CALC-SCNC: 5 MMOL/L — SIGNIFICANT CHANGE UP (ref 5–17)
BUN SERPL-MCNC: 18 MG/DL — SIGNIFICANT CHANGE UP (ref 7–23)
CALCIUM SERPL-MCNC: 8.6 MG/DL — SIGNIFICANT CHANGE UP (ref 8.5–10.1)
CHLORIDE SERPL-SCNC: 109 MMOL/L — HIGH (ref 96–108)
CO2 SERPL-SCNC: 27 MMOL/L — SIGNIFICANT CHANGE UP (ref 22–31)
CREAT SERPL-MCNC: 1.31 MG/DL — HIGH (ref 0.5–1.3)
EGFR: 51 ML/MIN/1.73M2 — LOW
GLUCOSE BLDC GLUCOMTR-MCNC: 106 MG/DL — HIGH (ref 70–99)
GLUCOSE BLDC GLUCOMTR-MCNC: 162 MG/DL — HIGH (ref 70–99)
GLUCOSE SERPL-MCNC: 158 MG/DL — HIGH (ref 70–99)
HCT VFR BLD CALC: 23.3 % — LOW (ref 39–50)
HGB BLD-MCNC: 7.5 G/DL — LOW (ref 13–17)
MCHC RBC-ENTMCNC: 26.8 PG — LOW (ref 27–34)
MCHC RBC-ENTMCNC: 32.2 GM/DL — SIGNIFICANT CHANGE UP (ref 32–36)
MCV RBC AUTO: 83.2 FL — SIGNIFICANT CHANGE UP (ref 80–100)
PLATELET # BLD AUTO: 291 K/UL — SIGNIFICANT CHANGE UP (ref 150–400)
POTASSIUM SERPL-MCNC: 3.2 MMOL/L — LOW (ref 3.5–5.3)
POTASSIUM SERPL-SCNC: 3.2 MMOL/L — LOW (ref 3.5–5.3)
RBC # BLD: 2.8 M/UL — LOW (ref 4.2–5.8)
RBC # FLD: 15.7 % — HIGH (ref 10.3–14.5)
SODIUM SERPL-SCNC: 141 MMOL/L — SIGNIFICANT CHANGE UP (ref 135–145)
WBC # BLD: 8.14 K/UL — SIGNIFICANT CHANGE UP (ref 3.8–10.5)
WBC # FLD AUTO: 8.14 K/UL — SIGNIFICANT CHANGE UP (ref 3.8–10.5)

## 2024-06-21 PROCEDURE — 99233 SBSQ HOSP IP/OBS HIGH 50: CPT

## 2024-06-21 RX ORDER — POTASSIUM CHLORIDE 600 MG/1
40 TABLET, FILM COATED, EXTENDED RELEASE ORAL ONCE
Refills: 0 | Status: COMPLETED | OUTPATIENT
Start: 2024-06-21 | End: 2024-06-21

## 2024-06-21 RX ORDER — LORAZEPAM 0.5 MG
0.25 TABLET ORAL EVERY 4 HOURS
Refills: 0 | Status: DISCONTINUED | OUTPATIENT
Start: 2024-06-21 | End: 2024-06-24

## 2024-06-21 RX ORDER — POTASSIUM CHLORIDE 600 MG/1
10 TABLET, FILM COATED, EXTENDED RELEASE ORAL
Refills: 0 | Status: COMPLETED | OUTPATIENT
Start: 2024-06-21 | End: 2024-06-21

## 2024-06-21 RX ADMIN — Medication 1 TABLET(S): at 09:59

## 2024-06-21 RX ADMIN — POTASSIUM CHLORIDE 100 MILLIEQUIVALENT(S): 600 TABLET, FILM COATED, EXTENDED RELEASE ORAL at 12:43

## 2024-06-21 RX ADMIN — Medication 100 MILLIGRAM(S): at 09:59

## 2024-06-21 RX ADMIN — Medication 0.25 MILLIGRAM(S): at 21:11

## 2024-06-21 RX ADMIN — INSULIN LISPRO 2: 100 INJECTION, SOLUTION SUBCUTANEOUS at 08:43

## 2024-06-21 RX ADMIN — Medication 1000 MICROGRAM(S): at 09:59

## 2024-06-21 RX ADMIN — CLOPIDOGREL BISULFATE 75 MILLIGRAM(S): 75 TABLET, FILM COATED ORAL at 10:00

## 2024-06-21 RX ADMIN — POTASSIUM CHLORIDE 100 MILLIEQUIVALENT(S): 600 TABLET, FILM COATED, EXTENDED RELEASE ORAL at 11:36

## 2024-06-21 RX ADMIN — MEMANTINE HYDROCHLORIDE 5 MILLIGRAM(S): 7 CAPSULE, EXTENDED RELEASE ORAL at 09:59

## 2024-06-21 RX ADMIN — Medication 1 TABLET(S): at 10:00

## 2024-06-21 RX ADMIN — AMLODIPINE BESYLATE 10 MILLIGRAM(S): 2.5 TABLET ORAL at 10:01

## 2024-06-21 RX ADMIN — POTASSIUM CHLORIDE 100 MILLIEQUIVALENT(S): 600 TABLET, FILM COATED, EXTENDED RELEASE ORAL at 13:49

## 2024-06-22 PROCEDURE — 99232 SBSQ HOSP IP/OBS MODERATE 35: CPT

## 2024-06-22 RX ADMIN — Medication 0.25 MILLIGRAM(S): at 22:23

## 2024-06-22 RX ADMIN — AMLODIPINE BESYLATE 10 MILLIGRAM(S): 2.5 TABLET ORAL at 10:21

## 2024-06-22 RX ADMIN — MEMANTINE HYDROCHLORIDE 5 MILLIGRAM(S): 7 CAPSULE, EXTENDED RELEASE ORAL at 10:22

## 2024-06-22 RX ADMIN — MEMANTINE HYDROCHLORIDE 5 MILLIGRAM(S): 7 CAPSULE, EXTENDED RELEASE ORAL at 22:17

## 2024-06-22 RX ADMIN — Medication 100 MILLIGRAM(S): at 10:22

## 2024-06-22 RX ADMIN — ATORVASTATIN CALCIUM 10 MILLIGRAM(S): 20 TABLET, FILM COATED ORAL at 22:19

## 2024-06-22 RX ADMIN — MIRTAZAPINE 7.5 MILLIGRAM(S): 15 TABLET, FILM COATED ORAL at 22:17

## 2024-06-22 RX ADMIN — Medication 1 TABLET(S): at 10:23

## 2024-06-22 RX ADMIN — DONEPEZIL HYDROCHLORIDE 5 MILLIGRAM(S): 10 TABLET, FILM COATED ORAL at 22:18

## 2024-06-22 RX ADMIN — TAMSULOSIN HYDROCHLORIDE 0.4 MILLIGRAM(S): 0.4 CAPSULE ORAL at 22:18

## 2024-06-22 RX ADMIN — Medication 1000 MICROGRAM(S): at 10:21

## 2024-06-23 PROCEDURE — 99232 SBSQ HOSP IP/OBS MODERATE 35: CPT

## 2024-06-23 RX ADMIN — Medication 3 MILLIGRAM(S): at 21:25

## 2024-06-23 RX ADMIN — Medication 100 MILLIGRAM(S): at 11:28

## 2024-06-23 RX ADMIN — MEMANTINE HYDROCHLORIDE 5 MILLIGRAM(S): 7 CAPSULE, EXTENDED RELEASE ORAL at 21:26

## 2024-06-23 RX ADMIN — POLYETHYLENE GLYCOL 3350 17 GRAM(S): 1 POWDER ORAL at 11:29

## 2024-06-23 RX ADMIN — TAMSULOSIN HYDROCHLORIDE 0.4 MILLIGRAM(S): 0.4 CAPSULE ORAL at 21:24

## 2024-06-23 RX ADMIN — Medication 1000 MICROGRAM(S): at 11:26

## 2024-06-23 RX ADMIN — MIRTAZAPINE 7.5 MILLIGRAM(S): 15 TABLET, FILM COATED ORAL at 21:26

## 2024-06-23 RX ADMIN — Medication 1 TABLET(S): at 11:24

## 2024-06-23 RX ADMIN — ATORVASTATIN CALCIUM 10 MILLIGRAM(S): 20 TABLET, FILM COATED ORAL at 21:25

## 2024-06-23 RX ADMIN — AMLODIPINE BESYLATE 10 MILLIGRAM(S): 2.5 TABLET ORAL at 11:28

## 2024-06-23 RX ADMIN — DONEPEZIL HYDROCHLORIDE 5 MILLIGRAM(S): 10 TABLET, FILM COATED ORAL at 21:26

## 2024-06-23 RX ADMIN — MEMANTINE HYDROCHLORIDE 5 MILLIGRAM(S): 7 CAPSULE, EXTENDED RELEASE ORAL at 11:23

## 2024-06-23 RX ADMIN — Medication 1 TABLET(S): at 11:25

## 2024-06-24 ENCOUNTER — TRANSCRIPTION ENCOUNTER (OUTPATIENT)
Age: 89
End: 2024-06-24

## 2024-06-24 VITALS
DIASTOLIC BLOOD PRESSURE: 42 MMHG | RESPIRATION RATE: 18 BRPM | SYSTOLIC BLOOD PRESSURE: 122 MMHG | OXYGEN SATURATION: 97 % | HEART RATE: 73 BPM | TEMPERATURE: 97 F

## 2024-06-24 PROCEDURE — 99239 HOSP IP/OBS DSCHRG MGMT >30: CPT

## 2024-06-24 RX ORDER — METOPROLOL TARTRATE 50 MG
1 TABLET ORAL
Qty: 30 | Refills: 0
Start: 2024-06-24 | End: 2024-07-23

## 2024-06-24 RX ORDER — AMLODIPINE BESYLATE 2.5 MG/1
1 TABLET ORAL
Qty: 30 | Refills: 0
Start: 2024-06-24 | End: 2024-07-23

## 2024-06-24 RX ORDER — MIRTAZAPINE 15 MG/1
1 TABLET, FILM COATED ORAL
Qty: 30 | Refills: 0
Start: 2024-06-24 | End: 2024-07-23

## 2024-06-24 RX ORDER — MIRTAZAPINE 15 MG/1
1 TABLET, FILM COATED ORAL
Qty: 0 | Refills: 0 | DISCHARGE
Start: 2024-06-24

## 2024-06-24 RX ORDER — AMLODIPINE BESYLATE 2.5 MG/1
1 TABLET ORAL
Qty: 0 | Refills: 0 | DISCHARGE
Start: 2024-06-24

## 2024-06-24 RX ORDER — CLOPIDOGREL BISULFATE 75 MG/1
1 TABLET, FILM COATED ORAL
Qty: 0 | Refills: 0 | DISCHARGE

## 2024-06-24 RX ORDER — MEMANTINE HYDROCHLORIDE 7 MG/1
1 CAPSULE, EXTENDED RELEASE ORAL
Qty: 60 | Refills: 0
Start: 2024-06-24 | End: 2024-07-23

## 2024-06-24 RX ORDER — POLYETHYLENE GLYCOL 3350 1 G/G
17 POWDER ORAL
Qty: 0 | Refills: 0 | DISCHARGE
Start: 2024-06-24

## 2024-06-24 RX ORDER — DONEPEZIL HYDROCHLORIDE 10 MG/1
1 TABLET, FILM COATED ORAL
Qty: 30 | Refills: 0
Start: 2024-06-24 | End: 2024-07-23

## 2024-06-24 RX ORDER — MEMANTINE HYDROCHLORIDE 7 MG/1
1 CAPSULE, EXTENDED RELEASE ORAL
Qty: 0 | Refills: 0 | DISCHARGE
Start: 2024-06-24

## 2024-06-24 RX ORDER — DONEPEZIL HYDROCHLORIDE 10 MG/1
1 TABLET, FILM COATED ORAL
Qty: 0 | Refills: 0 | DISCHARGE
Start: 2024-06-24

## 2024-06-24 RX ORDER — METFORMIN HYDROCHLORIDE 850 MG/1
1 TABLET ORAL
Refills: 0 | DISCHARGE

## 2024-06-24 RX ORDER — METOPROLOL TARTRATE 50 MG
1 TABLET ORAL
Refills: 0 | DISCHARGE

## 2024-06-24 RX ORDER — POLYETHYLENE GLYCOL 3350 1 G/G
17 POWDER ORAL
Qty: 510 | Refills: 0
Start: 2024-06-24 | End: 2024-07-23

## 2024-06-24 RX ADMIN — Medication 1 TABLET(S): at 10:58

## 2024-06-24 RX ADMIN — AMLODIPINE BESYLATE 10 MILLIGRAM(S): 2.5 TABLET ORAL at 10:58

## 2024-06-24 RX ADMIN — Medication 100 MILLIGRAM(S): at 10:59

## 2024-06-24 RX ADMIN — MEMANTINE HYDROCHLORIDE 5 MILLIGRAM(S): 7 CAPSULE, EXTENDED RELEASE ORAL at 10:58

## 2024-06-24 RX ADMIN — Medication 1000 MICROGRAM(S): at 10:58

## 2024-06-24 RX ADMIN — POLYETHYLENE GLYCOL 3350 17 GRAM(S): 1 POWDER ORAL at 10:59

## 2024-06-26 NOTE — PATIENT PROFILE ADULT - FUNCTIONAL ASSESSMENT - DAILY ACTIVITY SECTION LABEL
SURGICAL SCHEDULING REQUEST  Mercedes Lezama DO    Diagnosis:   Breast/Axillary Cancer Left Window For Surgery:2-4 weeks      Allergies:  ALLERGIES:  No Known Allergies    Latex Allergy:     No    BMI:   Estimated body mass index is 33.83 kg/m² as calculated from the following:    Height as of 6/26/24: 5' 3\" (1.6 m).    Weight as of 6/26/24: 86.6 kg (191 lb).      Breast Surgery:    Left [place] [facility] Lymph Node biopsy [33742]   Left Partial Mastectomy (Lumpectomy) [47360]   Possible left breast retro areola abscess cavity removal    Plastic Surgery:   No    Breast Surgeon Time Needed: 2 hours  Plastic Surgeon Time Needed:     Special Surgical Equipment Needed:  SpyPhi    Breast Surgery Pre-Op:  Pre-op breast-surgery: Nuc Med Injection:  Left  Localization:  Left Localization [facility]:  [facility]    Additional Surgeon:   Not Needed    Admission Status:   Day surgery/Outpatient    Anesthesia:  General    Other Pertinent Patient Information:  Glaucoma:  No  Diabetes: No    Pre-op Orders:      Medical Clearance:  Primary Care Physician: Buzz Escobedo MD    High Risk Medications:  Neoadjuvant Chemotherapy      Enroll patient in Research Study: No: If Yes, Study Name:     Notify Radiology of RF tag and need for Faxitron images: Yes        
.

## 2024-06-28 DIAGNOSIS — R31.9 HEMATURIA, UNSPECIFIED: ICD-10-CM

## 2024-06-28 DIAGNOSIS — Z82.49 FAMILY HISTORY OF ISCHEMIC HEART DISEASE AND OTHER DISEASES OF THE CIRCULATORY SYSTEM: ICD-10-CM

## 2024-06-28 DIAGNOSIS — I25.10 ATHEROSCLEROTIC HEART DISEASE OF NATIVE CORONARY ARTERY WITHOUT ANGINA PECTORIS: ICD-10-CM

## 2024-06-28 DIAGNOSIS — E11.22 TYPE 2 DIABETES MELLITUS WITH DIABETIC CHRONIC KIDNEY DISEASE: ICD-10-CM

## 2024-06-28 DIAGNOSIS — T44.7X6A UNDERDOSING OF BETA-ADRENORECEPTOR ANTAGONISTS, INITIAL ENCOUNTER: ICD-10-CM
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
## 2024-06-28 DIAGNOSIS — E43 UNSPECIFIED SEVERE PROTEIN-CALORIE MALNUTRITION: ICD-10-CM

## 2024-06-28 DIAGNOSIS — C79.11 SECONDARY MALIGNANT NEOPLASM OF BLADDER: ICD-10-CM

## 2024-06-28 DIAGNOSIS — D64.9 ANEMIA, UNSPECIFIED: ICD-10-CM

## 2024-06-28 DIAGNOSIS — E78.5 HYPERLIPIDEMIA, UNSPECIFIED: ICD-10-CM

## 2024-06-28 DIAGNOSIS — Z80.49 FAMILY HISTORY OF MALIGNANT NEOPLASM OF OTHER GENITAL ORGANS: ICD-10-CM

## 2024-06-28 DIAGNOSIS — Z79.84 LONG TERM (CURRENT) USE OF ORAL HYPOGLYCEMIC DRUGS: ICD-10-CM

## 2024-06-28 DIAGNOSIS — E87.6 HYPOKALEMIA: ICD-10-CM

## 2024-06-28 DIAGNOSIS — N17.9 ACUTE KIDNEY FAILURE, UNSPECIFIED: ICD-10-CM

## 2024-06-28 DIAGNOSIS — G93.41 METABOLIC ENCEPHALOPATHY: ICD-10-CM

## 2024-06-28 DIAGNOSIS — C61 MALIGNANT NEOPLASM OF PROSTATE: ICD-10-CM

## 2024-06-28 DIAGNOSIS — R33.9 RETENTION OF URINE, UNSPECIFIED: ICD-10-CM

## 2024-06-28 DIAGNOSIS — I12.9 HYPERTENSIVE CHRONIC KIDNEY DISEASE WITH STAGE 1 THROUGH STAGE 4 CHRONIC KIDNEY DISEASE, OR UNSPECIFIED CHRONIC KIDNEY DISEASE: ICD-10-CM

## 2024-06-28 DIAGNOSIS — N18.30 CHRONIC KIDNEY DISEASE, STAGE 3 UNSPECIFIED: ICD-10-CM

## 2024-06-28 DIAGNOSIS — Z91.148 PATIENT'S OTHER NONCOMPLIANCE WITH MEDICATION REGIMEN FOR OTHER REASON: ICD-10-CM

## 2024-06-28 DIAGNOSIS — K21.9 GASTRO-ESOPHAGEAL REFLUX DISEASE WITHOUT ESOPHAGITIS: ICD-10-CM

## 2024-06-28 DIAGNOSIS — N39.0 URINARY TRACT INFECTION, SITE NOT SPECIFIED: ICD-10-CM

## 2024-08-26 NOTE — PATIENT PROFILE ADULT - SAFE PLACE TO LIVE
no [FreeTextEntry1] : Mr. Storm is a 61 year old man with unfavorable intermediate risk prostate cancer, s/p definitive Pd-103 prostate implant on 9/14/22 under the care of Dr. Allen Weems.   HPI: His PSA increased from 4.55ng/ml on 4/6/2021 to 10.20 on 4/19/22. Since then: 5/26/22: 6.17 6/16/22: 6.39 - He had a multiparametric prostate MRI on 6/10/22 which showed a 4 x 4 x 4.6cm = 39cc gland and a b/l peripheral zone lesion with capsular irregularity and neurovascular abutment without sam NEETA; no SVI or LAD.  - On his biopsy on 7/6/22, 7/13 cores were positive with highest [place] score 3+4=7, the remainder Michael 6.  He takes tadalafil.  - Colonoscopy 9/1/22 showed internal hemorrhoids, 2 small benign polyps.  Baseline IPSS/EPIC Score 6/18  Underwent permanent prostate seed implant on 9/14/22; 125 Gy using Pd-103, 96 seeds via 16 needles.  Hydrogel spacer placed. Had increased urgency and weaker stream post implant; AZO and tamsulosin started.   10/25/22  followup: No major urinary issues as he continues to use AZO 1-2 times daily, reports urinary urgency when he attempted to stop. He continues on Tamsulosin.  He is able to achieve strong erections although he takes Cialis for occasional sexual activity. He does have discomfort from anal fissure/hemorrhoids and is using a steroid cream for this, reports rare blood upon wiping after bowel movement. Denies diarrhea or constipation. IPSS/EPIC Score 10/13  1/31/23:follow up Feels generally well.  symptoms almost back to baseline. Nocturia 1x,  fair to good stream.   Denies dribbling, dysuria, hematuria or bowel symptoms. Tamsulosin 1 tab at night, has missed doses without worsening symptoms. Reports erections only suitable for masturbation with cialis, takes 5 mg intermittently  IPSS/QOL/EPIC score 4/2/10.  - 2/17/23: reported sudden onset watery diarrhea, resolved  5/4/23: follow up Feels generally well.  symptoms back to baseline. Nocturia 1x, good stream.  Rare dribbling with urgency. Doing kegels but not consistently. Denies dysuria, hematuria or bowel symptoms. Tamsulosin 1 tab at night. Reports erections only suitable for masturbation with cialis, takes 5 mg daily IPSS/QOL/EPIC score 1/2/7.   8/22/23: Followup  Doing well overall. Bowel function normal. Erectile function (did not have adt) on cialis 5mg, states he is dissatisfied with function for 20 years but able to masturbate, although not a major issue for him. Urinary function is good on flomax qpm, nocturia x 1. Doing kegels.  IPSS/QOL/EPIC: 5/2/15  PSA trend: iPSA 5/26/22: 6.17 6/16/22: 6.39 -> prostate seed implant on 9/14/22 1/14/23: 0.99 4/25/23: 1.01 8/7/23: 0.84 2/5/24: 0.32  8/6/2024- .79  2/13/24 Followup.  Patient reports nocturia 2-3 times every night. He denies any burning, no urgency, no frequency. He also reports occasional dribbling and denies constipation. Some gas and bloating; using metamucil. He reports he has erections sufficient for masturbation, but don't last; he is not sexually active with a partner. He continues to take Flomax 0.4mg nightly, and Tadalafil 5mg daily.  IPSS/QOL/EPIC:  7/2/16 8/22/2024- Mr. Storm presents today for follow up. His PSA is slightly increased at 0.79. Follows with Dr. Terry, continues on tadalafil. Symptoms mostly under control, IPSS 6, EPIC 13, QOL2. Notes mild dribbling, frequency, incomplete emptying, intermittency, urgency, weak stream, and nocturia.

## 2024-09-19 NOTE — DISCHARGE NOTE NURSING/CASE MANAGEMENT/SOCIAL WORK - PATIENT PORTAL LINK FT
You can access the FollowMyHealth Patient Portal offered by [facility] by registering at the following website: [email or web address]. By joining [employer]’s FollowMyHealth portal, you will also be able to view your health information using other applications (apps) compatible with our system.
My signature below certifies that the above stated patient is homebound and upon completion of the Face-To-Face encounter, has the need for intermittent skilled nursing, physical therapy and/or speech or occupational therapy services in their home for their current diagnosis as outlined in their initial plan of care. These services will continue to be monitored by myself or another physician.

## 2025-01-28 NOTE — PATIENT PROFILE ADULT - FUNCTIONAL SCREEN CURRENT LEVEL: SWALLOWING (IF SCORE 2 OR MORE FOR ANY ITEM, CONSULT REHAB SERVICES), MLM)
Patient : Valentin Artis Age: 65 year old Sex: male   MRN: [id number] Encounter Date: 1/28/2025      History      Chief Complaint   Patient presents with    Shortness of Breath     HPI  1/28/2025  12:33 AM Valentin Artis is a 65 year old male with hx/o emphysema who presents to the ED for evaluation of SOB that began shortly PTA. EMS reports pt was at 60% O2 saturation on room air, and he is up to 93% upon arrival to ED. They note giving him 16 dex and two duo-neb treatments. The patient also reports smoking. The patient denies alcohol consumption, or other associated sx.    PCP: Pcp, No    I have reviewed Valentin Artis's previous discharge summary from 1/26/25 .  Note Review Summary:   Discharge Diagnoses:   -- Acute on chronic hypoxic respiratory failure  -- Chronic hypercarbic respiratory failure  -- Acute exacerbation of end-stage COPD  -- Severe pulmonary HTN, WHO 3  -- Medical non-compliance  -- Tobacco dependence  -- Recurrent hospitalizations (13 since October 2024)      Past/Family/Social History     No Known Allergies    No current facility-administered medications for this encounter.     Current Outpatient Medications   Medication Sig    albuterol 108 (90 Base) MCG/ACT inhaler Inhale 2 puffs into the lungs every 4 hours as needed for Shortness of Breath or Wheezing.    azithromycin (ZITHROMAX) 250 MG tablet Take 1 tablet by mouth 3 days a week.    predniSONE (DELTASONE) 10 MG tablet Take 3 tablets by mouth daily (with breakfast) for 2 days, THEN 2 tablets daily (with breakfast) for 3 days, THEN 1 tablet daily (with breakfast) for 3 days.    sulfamethoxazole-trimethoprim (BACTRIM SS) 400-80 MG per tablet Take 1 tablet by mouth daily.    fluticasone-salmeterol 500-50 MCG/ACT inhaler Inhale 1 puff into the lungs in the morning and 1 puff in the evening.    traZODone (DESYREL) 50 MG tablet Take 50 mg by mouth nightly.    triamcinolone (KENALOG) 0.5 % cream Apply 1 Application topically in the morning and 1  Application in the evening.    tiotropium (Spiriva HandiHaler) 18 MCG capsule for inhaler Place 18 mcg into inhaler and inhale daily.    famotidine (PEPCID) 20 MG tablet Take 20 mg by mouth in the morning and 20 mg in the evening.    Combivent Respimat  MCG/ACT inhaler Inhale 1 puff into the lungs 4 times daily as needed for Shortness of Breath.    albuterol 108 (90 Base) MCG/ACT inhaler Inhale 2 puffs into the lungs every 4 hours as needed for Shortness of Breath or Wheezing.    oxygen (O2) gas Inhale 2 L/min into the lungs continuous.    aspirin (ECOTRIN) 81 MG EC tablet Take 81 mg by mouth daily.    acetaminophen (TYLENOL) 500 MG tablet Take 1,000 mg by mouth every 6 hours as needed for Pain (Every 6-8 Hours as needed).    busPIRone (BUSPAR) 10 MG tablet Take 10 mg by mouth in the morning and 10 mg in the evening.    cetirizine (ZyrTEC) 10 MG tablet Take 10 mg by mouth daily.    DULoxetine (CYMBALTA) 30 MG capsule Take 30 mg by mouth daily.    melatonin 3 MG Take 3 mg by mouth at bedtime.    pantoprazole (PROTONIX) 40 MG tablet Take 40 mg by mouth daily.    atorvastatin (LIPITOR) 20 MG tablet Take 20 mg by mouth daily.    tamsulosin (FLOMAX) 0.4 MG Cap Take 1 capsule by mouth daily after a meal. Do not start before December 8, 2022.       Past Medical History:   Diagnosis Date    Acute respiratory failure with hypoxia and hypercapnia  (CMD)     Tobacco abuse        No past surgical history on file.    No family history on file.    Social History     Tobacco Use    Smoking status: Every Day     Current packs/day: 1.00     Average packs/day: 0.5 packs/day for 47.8 years (24.0 ttl pk-yrs)     Types: Cigarettes     Start date: 3/29/1977    Smokeless tobacco: Former   Substance Use Topics    Alcohol use: Not Currently     Comment: week ends     Drug use: No          Review of Systems   Review of Symptoms     Review of Systems   HENT:  Negative for congestion and facial swelling.    Respiratory:  Positive for  shortness of breath. Negative for chest tightness.    Cardiovascular:  Negative for chest pain.   Gastrointestinal:  Negative for abdominal distention and abdominal pain.   Genitourinary:  Negative for difficulty urinating and dysuria.   Musculoskeletal:  Negative for back pain.   Skin:  Negative for rash.   Neurological:  Negative for seizures.   Psychiatric/Behavioral: Negative.          Physical Exam   Physical Exam     ED Triage Vitals [01/28/25 0040]   ED Triage Vitals Group      Temp 97.2 °F (36.2 °C)      Heart Rate (!) 110      Resp (!) 33      BP (!) 135/96      SpO2 99 %      EtCO2 mmHg       Height       Weight 134 lb 0.6 oz (60.8 kg)      Weight Scale Used       BMI (Calculated)       IBW/kg (Calculated)        Physical Exam  Vitals and nursing note reviewed.   Constitutional:       Appearance: He is well-developed.   Eyes:      General: No scleral icterus.  Cardiovascular:      Rate and Rhythm: Normal rate and regular rhythm.      Heart sounds: Normal heart sounds. No murmur heard.     No friction rub. No gallop.   Pulmonary:      Effort: Tachypnea and accessory muscle usage present.      Breath sounds: No stridor. Decreased breath sounds present. No wheezing or rales.   Abdominal:      General: Bowel sounds are normal. There is no distension.      Palpations: Abdomen is soft. There is no mass.      Tenderness: There is no abdominal tenderness. There is no guarding or rebound.   Musculoskeletal:         General: No tenderness. Normal range of motion.   Skin:     General: Skin is warm and dry.   Neurological:      Mental Status: He is alert and oriented to person, place, and time.      GCS: GCS eye subscore is 4. GCS verbal subscore is 5. GCS motor subscore is 6.          Procedures   ED Procedures     Procedures     Lab Results   ED Lab     Results for orders placed or performed during the hospital encounter of 01/28/25   Comprehensive Metabolic Panel    Specimen: Blood, Venous   Result Value Ref Range     Fasting Status      Sodium 139 135 - 145 mmol/L    Potassium 5.3 (H) 3.4 - 5.1 mmol/L    Chloride 97 97 - 110 mmol/L    Carbon Dioxide 43 (HH) 21 - 32 mmol/L    Anion Gap 4 (L) 7 - 19 mmol/L    Glucose 157 (H) 70 - 99 mg/dL    BUN 27 (H) 6 - 20 mg/dL    Creatinine 0.56 (L) 0.67 - 1.17 mg/dL    Glomerular Filtration Rate >90 >=60    BUN/Cr 48 (H) 7 - 25    Calcium 9.4 8.4 - 10.2 mg/dL    Bilirubin, Total 0.6 0.2 - 1.0 mg/dL    GOT/AST 40 (H) <=37 Units/L    GPT/ALT 38 <64 Units/L    Alkaline Phosphatase 123 (H) 45 - 117 Units/L    Albumin 3.1 (L) 3.4 - 5.0 g/dL    Protein, Total 6.7 6.4 - 8.2 g/dL    Globulin 3.6 2.0 - 4.0 g/dL    A/G Ratio 0.9 (L) 1.0 - 2.4   TROPONIN I, HIGH SENSITIVITY    Specimen: Blood, Venous   Result Value Ref Range    Troponin I, High Sensitivity 60 <77 ng/L   NT proBNP    Specimen: Blood, Venous   Result Value Ref Range    NT-proBNP 1,058 (H) <=125 pg/mL   COVID/Flu/RSV panel    Specimen: Nasal, Mid-turbinate; Swab   Result Value Ref Range    Rapid SARS-COV-2 by PCR Not Detected Not Detected / Detected / Presumptive Positive / Inhibitors present    Influenza A by PCR Not Detected Not Detected    Influenza B by PCR Not Detected Not Detected    RSV BY PCR Not Detected Not Detected    Isolation Guidelines      Procedural Comment     CBC with Automated Differential (performable only)    Specimen: Blood, Venous   Result Value Ref Range    WBC 21.7 (H) 4.2 - 11.0 K/mcL    RBC 5.71 4.50 - 5.90 mil/mcL    HGB 16.2 13.0 - 17.0 g/dL    HCT 54.2 (H) 39.0 - 51.0 %    MCV 94.9 78.0 - 100.0 fl    MCH 28.4 26.0 - 34.0 pg    MCHC 29.9 (L) 32.0 - 36.5 g/dL    RDW-CV 17.2 (H) 11.0 - 15.0 %    RDW-SD 57.8 (H) 39.0 - 50.0 fL     140 - 450 K/mcL    NRBC 0 <=0 /100 WBC    Neutrophil, Percent 78 %    Lymphocytes, Percent 10 %    Mono, Percent 11 %    Eosinophils, Percent 0 %    Basophils, Percent 0 %    Immature Granulocytes 1 %    Absolute Neutrophils 17.0 (H) 1.8 - 7.7 K/mcL    Absolute Lymphocytes 2.1  1.0 - 4.0 K/mcL    Absolute Monocytes 2.3 (H) 0.3 - 0.9 K/mcL    Absolute Eosinophils  0.1 0.0 - 0.5 K/mcL    Absolute Basophils 0.1 0.0 - 0.3 K/mcL    Absolute Immature Granulocytes 0.2 0.0 - 0.2 K/mcL   ISTAT8 VENOUS  POINT OF CARE    Specimen: Blood   Result Value Ref Range    BUN - POINT OF CARE 40 (H) 6 - 20 mg/dL    SODIUM - POINT OF CARE 138 135 - 145 mmol/L    POTASSIUM - POINT OF CARE 5.5 (H) 3.4 - 5.1 mmol/L    CHLORIDE - POINT OF CARE 92 (L) 97 - 110 mmol/L    TCO2 - POINT OF CARE      ANION GAP - POINT OF CARE      HEMATOCRIT - POINT OF CARE 57.0 (H) 39.0 - 51.0 %    HEMOGLOBIN - POINT OF CARE 19.4 (H) 13.0 - 17.0 g/dL    GLUCOSE - POINT OF CARE 155 (H) 70 - 99 mg/dL    CALCIUM, IONIZED - POINT OF CARE 1.23 1.15 - 1.29 mmol/L    CREATININE - POINT OF CARE 0.70 0.67 - 1.17 mg/dL    GLOMERULAR FILTRATION RATE - POINT OF CARE >90 >=60   BLOOD GAS, VENOUS -POINT OF CARE    Specimen: Blood, Venous   Result Value Ref Range    PH, VENOUS - POINT OF CARE 7.22 (L) 7.35 - 7.45 Units    PCO2, VENOUS - POINT OF CARE >90 (H) 41 - 54 mm Hg    PO2, VENOUS - POINT OF CARE 77 (H) 35 - 42 mm Hg    HCO3, VENOUS - POINT OF CARE      BASE EXCESS / DEFICIT, VENOUS - POINT OF CARE      O2 SATURATION, VENOUS - POINT OF CARE     TROPONIN I  POINT OF CARE    Specimen: Blood   Result Value Ref Range    TROPONIN I - POINT OF CARE <0.10 <0.10 ng/mL   BLOOD GAS, VENOUS -POINT OF CARE    Specimen: Blood, Venous   Result Value Ref Range    PH, VENOUS - POINT OF CARE 7.23 (L) 7.35 - 7.45 Units    PCO2, VENOUS - POINT OF CARE >90 (H) 41 - 54 mm Hg    PO2, VENOUS - POINT OF CARE 23 (L) 35 - 42 mm Hg    HCO3, VENOUS - POINT OF CARE      BASE EXCESS / DEFICIT, VENOUS - POINT OF CARE      O2 SATURATION, VENOUS - POINT OF CARE            EKG     [facility] EKG report   Results for orders placed or performed during the hospital encounter of 01/28/25   Electrocardiogram 12-Lead   Result Value Ref Range    Systolic Blood Pressure 135     Diastolic  Blood Pressure 96     Ventricular Rate EKG/Min (BPM) 111     Atrial Rate (BPM) 111     NC-Interval (MSEC) 138     QRS-Interval (MSEC) 68     QT-Interval (MSEC) 290     QTc 394     P Axis (Degrees) 80     R Axis (Degrees) 90     T Axis (Degrees) 74     REPORT TEXT       Sinus tachycardia  Biatrial enlargement  Rightward axis  Pulmonary disease pattern  Abnormal ECG  When compared with ECG of 26-JAN-2025 22:15,  Questionable change in QRS axis  Confirmed by MD ROBLES, KELLY ([id number]),  Ryan Luo ([id number]) on 1/28/2025 12:39:16 AM         Radiology Results   ED Radiology Results     Imaging Results              XR CHEST AP OR PA (Final result)  Result time 01/28/25 01:12:51      Final result                   Impression:    IMPRESSION:    Findings suspicious for mild pulmonary interstitial edema.      I, Attending Radiologist Tu Garber MD, have reviewed the images and  report and concur with these findings interpreted by Resident Radiologist,  Chung Urban MD.    Electronically Signed by: Tu Garber MD  Signed on: 1/28/2025 1:12 AM  Created on Workstation ID: [id number]  Signed on Workstation ID: [id number]               Narrative:    XR CHEST AP OR PA    HISTORY: 65 years-old Male with presenting history of sob.        COMPARISON: Multiple prior exams most recent 1/26/2025.    TECHNIQUE: Single, AP upright view of the chest.    FINDINGS:    The cardiac silhouette and mediastinal contours are within normal limits.  New mild pulmonary vascular prominence. New mild diffuse interstitial  prominence, most pronounced in the left perihilar region. No focal airspace  opacities. No pleural effusion. No pneumothorax.    The visualized osseous structures demonstrate no acute abnormality.                        Preliminary result                   Impression:    IMPRESSION:    Findings suspicious for mild pulmonary interstitial edema.    * * * * * * * * * * * * * * PRELIMINARY REPORT * * * * * * * * * * * * * *                     Narrative:        * * * * * * * * * * * * * * PRELIMINARY REPORT * * * * * * * * * * * * * *     XR CHEST AP OR PA    HISTORY: 65 years-old Male with presenting history of sob.        COMPARISON: Multiple prior exams most recent 1/26/2025.    TECHNIQUE: Single, AP upright view of the chest.    FINDINGS:    The cardiac silhouette and mediastinal contours are within normal limits.  New mild pulmonary vascular prominence. New mild diffuse interstitial  prominence, most pronounced in the left perihilar region. No focal airspace  opacities. No pleural effusion. No pneumothorax.    The visualized osseous structures demonstrate no acute abnormality.                                         ED Medications   ED Medications     Medications   furosemide (LASIX INJECT) injection 40 mg (40 mg Intravenous Given 1/28/25 0136)          ED Course     Vitals:    01/28/25 0110 01/28/25 0130 01/28/25 0200 01/28/25 0227   BP:  124/70 130/75    Pulse:  89 80    Resp:  (!) 29 (!) 24    Temp:       TempSrc:       SpO2: 99% 99% 97% 90%   Weight:                Cardiac Monitor Review: 2:55 AM  I have independently reviewed the patients cardiac monitor. The patient's rhythm shows normal sinus rhythm  with rate of 87 bpm.          Consults                ED Consults done in the ED course    Medical Decision Making                                      Patient is a 65 year old with complaint of shortness of breath.  My differential diagnosis includes but is not limited to COPD exacerbation versus pneumonia versus congestive heart failure. The patient will require admission.  Patient presents with severe respiratory distress.  I did place patient on BiPAP.  VBG revealed he was in respiratory acidosis.  There is also pulmonary edema noted on chest x-ray with an elevated BNP so I did diurese.  After 1 hour patient ripped off the BiPAP and stated he will not allow us to replace the BiPAP.  I did offer medication to help with  sedation but he declined.  Patient is a multiple admissions where he refuses BiPAP and leaves AMA.  I did discuss intubation with the patient and he declines intubation.  At that point I did offer the patient become DNR and he said no that if he stops breathing we should do everything to keep him alive however he is not going to die.  He has made contradictory statements however in [facility] he has his own decision maker.  I did attempt to contact the healthcare agent listed however I was only able to leave a message.  Given that he is still hypoxic with respiratory acidosis I discussed the case with the intensivist who admit the patient.        Does the patient have sepsis: NO     Critical Care       Due to the presence of respiratory distress my attendance to this patient required critical care time of 40 minutes, including assessment/reassessment, documentation, ordering and interpreting ancillary studies, discussion with ED staff and consultants, patient and their family, and excludes time spent on separately billable procedures.        Disposition       Clinical Impression and Diagnosis  2:57 AM 01/28/25     Diagnosis:   1. Dyspnea, unspecified type    2. Respiratory acidosis    3. Acute pulmonary edema  (CMD)                     Admit 1/28/2025  2:47 AM  Telemetry Bed?: Yes  Admitting Physician: CARSON AQUINO [[id number]]  Is this a telephone or verbal order?: This is a telephone order from the admitting physician            I have reviewed the information recorded by the scribe for accuracy and agree with its contents.    ____________________________________________________________________    Ryan Luo acting as a scribe for Dr. Charli Spence  Dictation # [id number]  Scribe: Ryan Luo    Note has been documented by Ryan Luo on 1/28/2025     Charli Spence MD  01/28/25 0254     0 = swallows foods/liquids without difficulty